# Patient Record
Sex: FEMALE | Race: BLACK OR AFRICAN AMERICAN | NOT HISPANIC OR LATINO | Employment: FULL TIME | URBAN - METROPOLITAN AREA
[De-identification: names, ages, dates, MRNs, and addresses within clinical notes are randomized per-mention and may not be internally consistent; named-entity substitution may affect disease eponyms.]

---

## 2023-09-12 ENCOUNTER — LAB (OUTPATIENT)
Dept: LAB | Facility: LAB | Age: 63
End: 2023-09-12
Payer: COMMERCIAL

## 2023-09-12 ENCOUNTER — OFFICE VISIT (OUTPATIENT)
Dept: PRIMARY CARE | Facility: CLINIC | Age: 63
End: 2023-09-12
Payer: COMMERCIAL

## 2023-09-12 VITALS
TEMPERATURE: 94.2 F | SYSTOLIC BLOOD PRESSURE: 110 MMHG | WEIGHT: 288.7 LBS | BODY MASS INDEX: 46.4 KG/M2 | OXYGEN SATURATION: 95 % | HEIGHT: 66 IN | HEART RATE: 92 BPM | DIASTOLIC BLOOD PRESSURE: 74 MMHG | RESPIRATION RATE: 15 BRPM

## 2023-09-12 DIAGNOSIS — R10.31 RIGHT LOWER QUADRANT ABDOMINAL PAIN: Primary | ICD-10-CM

## 2023-09-12 DIAGNOSIS — R11.2 NAUSEA AND VOMITING, UNSPECIFIED VOMITING TYPE: ICD-10-CM

## 2023-09-12 DIAGNOSIS — R10.31 RIGHT LOWER QUADRANT ABDOMINAL PAIN: ICD-10-CM

## 2023-09-12 PROBLEM — C50.919 BREAST CANCER (MULTI): Status: ACTIVE | Noted: 2023-09-12

## 2023-09-12 PROBLEM — D48.5 NEOPLASM OF UNCERTAIN BEHAVIOR OF SKIN: Status: ACTIVE | Noted: 2023-01-18

## 2023-09-12 PROBLEM — Z20.822 EXPOSURE TO COVID-19 VIRUS: Status: ACTIVE | Noted: 2023-09-12

## 2023-09-12 PROBLEM — R06.09 DYSPNEA ON EXERTION: Status: ACTIVE | Noted: 2023-09-12

## 2023-09-12 PROBLEM — N31.9 BLADDER DYSFUNCTION: Status: ACTIVE | Noted: 2023-09-12

## 2023-09-12 PROBLEM — D22.5 MELANOCYTIC NEVI OF TRUNK: Status: ACTIVE | Noted: 2023-01-18

## 2023-09-12 PROBLEM — R04.2 COUGH WITH HEMOPTYSIS: Status: ACTIVE | Noted: 2023-09-12

## 2023-09-12 PROBLEM — R06.83 SNORING: Status: ACTIVE | Noted: 2023-09-12

## 2023-09-12 PROBLEM — N39.46 URINARY INCONTINENCE, MIXED: Status: ACTIVE | Noted: 2023-09-12

## 2023-09-12 PROBLEM — R51.9 HEADACHE: Status: ACTIVE | Noted: 2023-09-12

## 2023-09-12 PROBLEM — K58.0 IRRITABLE BOWEL SYNDROME WITH DIARRHEA: Status: ACTIVE | Noted: 2023-09-12

## 2023-09-12 PROBLEM — N64.89 BREAST ASYMMETRY: Status: ACTIVE | Noted: 2023-09-12

## 2023-09-12 PROBLEM — R60.0 BILATERAL LEG EDEMA: Status: ACTIVE | Noted: 2023-09-12

## 2023-09-12 PROBLEM — M25.569 KNEE PAIN: Status: ACTIVE | Noted: 2023-09-12

## 2023-09-12 PROBLEM — N12 PYELONEPHRITIS: Status: ACTIVE | Noted: 2023-09-12

## 2023-09-12 PROBLEM — L81.4 OTHER MELANIN HYPERPIGMENTATION: Status: ACTIVE | Noted: 2023-01-18

## 2023-09-12 PROBLEM — I77.819 AORTIC DILATATION (CMS-HCC): Status: ACTIVE | Noted: 2023-09-12

## 2023-09-12 PROBLEM — N62 MACROMASTIA: Status: ACTIVE | Noted: 2023-09-12

## 2023-09-12 PROBLEM — E88.810 DYSMETABOLIC SYNDROME: Status: ACTIVE | Noted: 2023-09-12

## 2023-09-12 PROBLEM — L72.3 SEBACEOUS CYST: Status: ACTIVE | Noted: 2023-01-18

## 2023-09-12 PROBLEM — R10.13 DYSPEPSIA: Status: ACTIVE | Noted: 2023-09-12

## 2023-09-12 PROBLEM — H53.8 BLURRY VISION: Status: ACTIVE | Noted: 2023-09-12

## 2023-09-12 PROBLEM — H52.03 HYPERMETROPIA OF BOTH EYES: Status: ACTIVE | Noted: 2023-09-12

## 2023-09-12 PROBLEM — R20.0 FACIAL NUMBNESS: Status: ACTIVE | Noted: 2023-09-12

## 2023-09-12 PROBLEM — G47.33 OBSTRUCTIVE SLEEP APNEA, ADULT: Status: ACTIVE | Noted: 2023-09-12

## 2023-09-12 PROBLEM — M25.469 KNEE EFFUSION: Status: ACTIVE | Noted: 2023-09-12

## 2023-09-12 PROBLEM — Z85.3 PERSONAL HISTORY OF MALIGNANT NEOPLASM OF BREAST: Status: ACTIVE | Noted: 2023-09-12

## 2023-09-12 PROBLEM — E28.39 HYPOESTROGENISM: Status: ACTIVE | Noted: 2023-09-12

## 2023-09-12 PROBLEM — F40.240 CLAUSTROPHOBIA: Status: ACTIVE | Noted: 2023-09-12

## 2023-09-12 PROBLEM — N81.6 RECTOCELE: Status: ACTIVE | Noted: 2023-09-12

## 2023-09-12 PROBLEM — K29.70 GASTRITIS: Status: ACTIVE | Noted: 2023-09-12

## 2023-09-12 PROBLEM — R73.01 IMPAIRED FASTING GLUCOSE: Status: ACTIVE | Noted: 2023-09-12

## 2023-09-12 PROBLEM — R93.89 ABNORMAL MRI: Status: ACTIVE | Noted: 2023-09-12

## 2023-09-12 PROBLEM — H52.4 BILATERAL PRESBYOPIA: Status: ACTIVE | Noted: 2023-09-12

## 2023-09-12 PROBLEM — R60.9 EDEMA: Status: ACTIVE | Noted: 2023-09-12

## 2023-09-12 PROBLEM — S89.90XA KNEE INJURIES: Status: ACTIVE | Noted: 2023-09-12

## 2023-09-12 PROBLEM — G35 MULTIPLE SCLEROSIS (MULTI): Status: ACTIVE | Noted: 2023-09-12

## 2023-09-12 PROBLEM — D18.01 HEMANGIOMA OF SKIN AND SUBCUTANEOUS TISSUE: Status: ACTIVE | Noted: 2023-01-18

## 2023-09-12 PROBLEM — R06.00 DYSPNEA, PAROXYSMAL NOCTURNAL: Status: ACTIVE | Noted: 2023-09-12

## 2023-09-12 PROBLEM — H52.203 ASTIGMATISM, BILATERAL: Status: ACTIVE | Noted: 2023-09-12

## 2023-09-12 PROBLEM — L03.90 CELLULITIS: Status: ACTIVE | Noted: 2023-09-12

## 2023-09-12 PROBLEM — L82.1 OTHER SEBORRHEIC KERATOSIS: Status: ACTIVE | Noted: 2023-01-18

## 2023-09-12 PROBLEM — R92.30 DENSE BREAST TISSUE ON MAMMOGRAM: Status: ACTIVE | Noted: 2023-09-12

## 2023-09-12 PROBLEM — G25.81 RESTLESS LEG SYNDROME: Status: ACTIVE | Noted: 2023-09-12

## 2023-09-12 PROBLEM — E66.9 OBESITY, UNSPECIFIED OBESITY SEVERITY, UNSPECIFIED OBESITY TYPE: Status: ACTIVE | Noted: 2023-09-12

## 2023-09-12 PROBLEM — M17.12 ARTHRITIS OF LEFT KNEE: Status: ACTIVE | Noted: 2023-09-12

## 2023-09-12 PROBLEM — R93.5 ABNORMAL ABDOMINAL CT SCAN: Status: ACTIVE | Noted: 2023-09-12

## 2023-09-12 PROBLEM — G43.009 MIGRAINE WITHOUT AURA AND WITHOUT STATUS MIGRAINOSUS, NOT INTRACTABLE: Status: ACTIVE | Noted: 2023-09-12

## 2023-09-12 LAB
ALANINE AMINOTRANSFERASE (SGPT) (U/L) IN SER/PLAS: 12 U/L (ref 7–45)
ALBUMIN (G/DL) IN SER/PLAS: 3.8 G/DL (ref 3.4–5)
ALKALINE PHOSPHATASE (U/L) IN SER/PLAS: 85 U/L (ref 33–136)
ANION GAP IN SER/PLAS: 16 MMOL/L (ref 10–20)
APPEARANCE, URINE: ABNORMAL
ASPARTATE AMINOTRANSFERASE (SGOT) (U/L) IN SER/PLAS: 20 U/L (ref 9–39)
BASOPHILS (10*3/UL) IN BLOOD BY AUTOMATED COUNT: 0.03 X10E9/L (ref 0–0.1)
BASOPHILS/100 LEUKOCYTES IN BLOOD BY AUTOMATED COUNT: 0.5 % (ref 0–2)
BILIRUBIN TOTAL (MG/DL) IN SER/PLAS: 0.5 MG/DL (ref 0–1.2)
BILIRUBIN, URINE: NEGATIVE
BLOOD, URINE: ABNORMAL
CALCIUM (MG/DL) IN SER/PLAS: 9.1 MG/DL (ref 8.6–10.6)
CARBON DIOXIDE, TOTAL (MMOL/L) IN SER/PLAS: 27 MMOL/L (ref 21–32)
CHLORIDE (MMOL/L) IN SER/PLAS: 103 MMOL/L (ref 98–107)
CHOLESTEROL (MG/DL) IN SER/PLAS: 172 MG/DL (ref 0–199)
CHOLESTEROL IN HDL (MG/DL) IN SER/PLAS: 47.2 MG/DL
CHOLESTEROL/HDL RATIO: 3.6
COLOR, URINE: YELLOW
CREATININE (MG/DL) IN SER/PLAS: 0.64 MG/DL (ref 0.5–1.05)
EOSINOPHILS (10*3/UL) IN BLOOD BY AUTOMATED COUNT: 0.18 X10E9/L (ref 0–0.7)
EOSINOPHILS/100 LEUKOCYTES IN BLOOD BY AUTOMATED COUNT: 3.3 % (ref 0–6)
ERYTHROCYTE DISTRIBUTION WIDTH (RATIO) BY AUTOMATED COUNT: 16 % (ref 11.5–14.5)
ERYTHROCYTE MEAN CORPUSCULAR HEMOGLOBIN CONCENTRATION (G/DL) BY AUTOMATED: 30.1 G/DL (ref 32–36)
ERYTHROCYTE MEAN CORPUSCULAR VOLUME (FL) BY AUTOMATED COUNT: 83 FL (ref 80–100)
ERYTHROCYTES (10*6/UL) IN BLOOD BY AUTOMATED COUNT: 4.52 X10E12/L (ref 4–5.2)
FINE GRANULAR CASTS, URINE: ABNORMAL /LPF
GFR FEMALE: >90 ML/MIN/1.73M2
GLUCOSE (MG/DL) IN SER/PLAS: 106 MG/DL (ref 74–99)
GLUCOSE, URINE: NEGATIVE MG/DL
HEMATOCRIT (%) IN BLOOD BY AUTOMATED COUNT: 37.6 % (ref 36–46)
HEMOGLOBIN (G/DL) IN BLOOD: 11.3 G/DL (ref 12–16)
HYALINE CASTS, URINE: ABNORMAL /LPF
IMMATURE GRANULOCYTES/100 LEUKOCYTES IN BLOOD BY AUTOMATED COUNT: 0.2 % (ref 0–0.9)
KETONES, URINE: NEGATIVE MG/DL
LDL: 99 MG/DL (ref 0–99)
LEUKOCYTE ESTERASE, URINE: ABNORMAL
LEUKOCYTES (10*3/UL) IN BLOOD BY AUTOMATED COUNT: 5.5 X10E9/L (ref 4.4–11.3)
LIPASE (U/L) IN SER/PLAS: 18 U/L (ref 9–82)
LYMPHOCYTES (10*3/UL) IN BLOOD BY AUTOMATED COUNT: 2.23 X10E9/L (ref 1.2–4.8)
LYMPHOCYTES/100 LEUKOCYTES IN BLOOD BY AUTOMATED COUNT: 40.4 % (ref 13–44)
MONOCYTES (10*3/UL) IN BLOOD BY AUTOMATED COUNT: 0.4 X10E9/L (ref 0.1–1)
MONOCYTES/100 LEUKOCYTES IN BLOOD BY AUTOMATED COUNT: 7.2 % (ref 2–10)
MUCUS, URINE: ABNORMAL /LPF
NEUTROPHILS (10*3/UL) IN BLOOD BY AUTOMATED COUNT: 2.67 X10E9/L (ref 1.2–7.7)
NEUTROPHILS/100 LEUKOCYTES IN BLOOD BY AUTOMATED COUNT: 48.4 % (ref 40–80)
NITRITE, URINE: NEGATIVE
NRBC (PER 100 WBCS) BY AUTOMATED COUNT: 0 /100 WBC (ref 0–0)
PH, URINE: 6 (ref 5–8)
PLATELETS (10*3/UL) IN BLOOD AUTOMATED COUNT: 327 X10E9/L (ref 150–450)
POTASSIUM (MMOL/L) IN SER/PLAS: 4.6 MMOL/L (ref 3.5–5.3)
PROTEIN TOTAL: 7.1 G/DL (ref 6.4–8.2)
PROTEIN, URINE: NEGATIVE MG/DL
RBC, URINE: 2 /HPF (ref 0–5)
SODIUM (MMOL/L) IN SER/PLAS: 141 MMOL/L (ref 136–145)
SPECIFIC GRAVITY, URINE: 1.02 (ref 1–1.03)
SQUAMOUS EPITHELIAL CELLS, URINE: 17 /HPF
TRIGLYCERIDE (MG/DL) IN SER/PLAS: 128 MG/DL (ref 0–149)
UREA NITROGEN (MG/DL) IN SER/PLAS: 12 MG/DL (ref 6–23)
UROBILINOGEN, URINE: <2 MG/DL (ref 0–1.9)
VLDL: 26 MG/DL (ref 0–40)
WBC, URINE: 3 /HPF (ref 0–5)

## 2023-09-12 PROCEDURE — 3008F BODY MASS INDEX DOCD: CPT | Performed by: STUDENT IN AN ORGANIZED HEALTH CARE EDUCATION/TRAINING PROGRAM

## 2023-09-12 PROCEDURE — 80053 COMPREHEN METABOLIC PANEL: CPT

## 2023-09-12 PROCEDURE — 81001 URINALYSIS AUTO W/SCOPE: CPT

## 2023-09-12 PROCEDURE — 83690 ASSAY OF LIPASE: CPT

## 2023-09-12 PROCEDURE — 99214 OFFICE O/P EST MOD 30 MIN: CPT | Performed by: STUDENT IN AN ORGANIZED HEALTH CARE EDUCATION/TRAINING PROGRAM

## 2023-09-12 PROCEDURE — 80061 LIPID PANEL: CPT

## 2023-09-12 PROCEDURE — 85025 COMPLETE CBC W/AUTO DIFF WBC: CPT

## 2023-09-12 PROCEDURE — 1036F TOBACCO NON-USER: CPT | Performed by: STUDENT IN AN ORGANIZED HEALTH CARE EDUCATION/TRAINING PROGRAM

## 2023-09-12 PROCEDURE — 36415 COLL VENOUS BLD VENIPUNCTURE: CPT

## 2023-09-12 RX ORDER — ANASTROZOLE 1 MG/1
1 TABLET ORAL DAILY
COMMUNITY
Start: 2022-07-15 | End: 2024-02-08 | Stop reason: ALTCHOICE

## 2023-09-12 RX ORDER — VENLAFAXINE 37.5 MG/1
1 TABLET ORAL DAILY
COMMUNITY
Start: 2022-03-18 | End: 2023-11-08 | Stop reason: SDUPTHER

## 2023-09-12 RX ORDER — FUROSEMIDE 20 MG/1
1 TABLET ORAL DAILY PRN
COMMUNITY
Start: 2022-07-21 | End: 2024-03-15 | Stop reason: WASHOUT

## 2023-09-12 RX ORDER — ONDANSETRON 4 MG/1
4 TABLET, FILM COATED ORAL EVERY 8 HOURS PRN
Qty: 20 TABLET | Refills: 0 | Status: SHIPPED | OUTPATIENT
Start: 2023-09-12 | End: 2023-09-19

## 2023-09-12 ASSESSMENT — ENCOUNTER SYMPTOMS
HEADACHES: 0
FEVER: 0
VOMITING: 1
ABDOMINAL PAIN: 1
NAUSEA: 1
HEMATOCHEZIA: 0
ANOREXIA: 1
DIARRHEA: 1
HEMATURIA: 0
DYSURIA: 0

## 2023-09-12 ASSESSMENT — LIFESTYLE VARIABLES: HOW MANY STANDARD DRINKS CONTAINING ALCOHOL DO YOU HAVE ON A TYPICAL DAY: PATIENT DOES NOT DRINK

## 2023-09-12 ASSESSMENT — PATIENT HEALTH QUESTIONNAIRE - PHQ9
SUM OF ALL RESPONSES TO PHQ9 QUESTIONS 1 AND 2: 0
1. LITTLE INTEREST OR PLEASURE IN DOING THINGS: NOT AT ALL
2. FEELING DOWN, DEPRESSED OR HOPELESS: NOT AT ALL

## 2023-09-12 NOTE — PROGRESS NOTES
Subjective   Patient ID: Calvin Monroe is a pleasant 63 y.o. female who presents for Follow-up (Pt is here for stomach issues.).  Abdominal Pain  This is a chronic problem. The current episode started more than 1 month ago (2 months). The onset quality is gradual. The problem occurs constantly. The problem has been unchanged. The pain is located in the RLQ (radiating to the back). The abdominal pain radiates to the back. Associated symptoms include anorexia, diarrhea, melena, nausea and vomiting. Pertinent negatives include no dysuria, fever, headaches, hematochezia or hematuria. The pain is aggravated by certain positions. The pain is relieved by Nothing. She has tried nothing for the symptoms. Her past medical history is significant for abdominal surgery (hernia repair 31 years ago) and gallstones.   Colonoscopy (start at age 46): 2016, repeat in 10 years     Review of Systems   Constitutional:  Negative for fever.   Gastrointestinal:  Positive for abdominal pain, anorexia, diarrhea, melena, nausea and vomiting. Negative for hematochezia.   Genitourinary:  Negative for dysuria and hematuria.   Neurological:  Negative for headaches.       Visit Vitals  /74 (Patient Position: Sitting)   Pulse 92   Temp 34.6 °C (94.2 °F)   Resp 15          Objective   Physical Exam  Constitutional:       General: She is not in acute distress.     Appearance: Normal appearance. She is obese.   HENT:      Head: Normocephalic and atraumatic.   Eyes:      General: No scleral icterus.     Conjunctiva/sclera: Conjunctivae normal.   Cardiovascular:      Rate and Rhythm: Normal rate and regular rhythm.      Heart sounds: Normal heart sounds.   Pulmonary:      Effort: Pulmonary effort is normal.      Breath sounds: Normal breath sounds. No wheezing.   Abdominal:      General: Bowel sounds are normal. There is no distension.      Palpations: Abdomen is soft.      Tenderness: There is abdominal tenderness (RUQ).    Musculoskeletal:      Cervical back: Neck supple.      Right lower leg: No edema.      Left lower leg: No edema.   Lymphadenopathy:      Cervical: No cervical adenopathy.   Skin:     General: Skin is warm and dry.   Neurological:      General: No focal deficit present.      Mental Status: She is alert and oriented to person, place, and time.   Psychiatric:         Mood and Affect: Mood normal.         Behavior: Behavior normal.         Assessment/Plan   Problem List Items Addressed This Visit       Nausea with vomiting    Relevant Medications    ondansetron (Zofran) 4 mg tablet     Other Visit Diagnoses       Right lower quadrant abdominal pain    -  Primary    Relevant Medications    ondansetron (Zofran) 4 mg tablet    Other Relevant Orders    CT abdomen pelvis w and wo IV contrast    CBC and Auto Differential    Comprehensive Metabolic Panel    Lipase    Lipid Panel    Urinalysis with Reflex Microscopic    US gallbladder    Referral to Gastroenterology

## 2023-09-14 ENCOUNTER — TELEPHONE (OUTPATIENT)
Dept: PRIMARY CARE | Facility: CLINIC | Age: 63
End: 2023-09-14
Payer: COMMERCIAL

## 2023-09-14 DIAGNOSIS — N39.0 URINARY TRACT INFECTION WITH HEMATURIA, SITE UNSPECIFIED: Primary | ICD-10-CM

## 2023-09-14 DIAGNOSIS — R31.9 URINARY TRACT INFECTION WITH HEMATURIA, SITE UNSPECIFIED: Primary | ICD-10-CM

## 2023-09-14 RX ORDER — NITROFURANTOIN 25; 75 MG/1; MG/1
100 CAPSULE ORAL 2 TIMES DAILY
Qty: 10 CAPSULE | Refills: 0 | Status: SHIPPED | OUTPATIENT
Start: 2023-09-14 | End: 2023-09-19

## 2023-09-27 ENCOUNTER — TELEPHONE (OUTPATIENT)
Dept: PRIMARY CARE | Facility: CLINIC | Age: 63
End: 2023-09-27

## 2023-09-27 PROBLEM — E66.01 MORBID OBESITY WITH BMI OF 40.0-44.9, ADULT (MULTI): Status: ACTIVE | Noted: 2023-09-27

## 2023-09-27 PROBLEM — R73.03 PREDIABETES: Status: ACTIVE | Noted: 2023-09-27

## 2023-09-27 NOTE — PROGRESS NOTES
Calvin Monroe is a 63 y.o. female with past medical history of breast cancer in remission, MS, and irritable bowel syndrome who is referred by Dr. Lagunas for evaluation of RLQ abdominal pain. She reports a 6-12 month history of abdominal pain. This started gradual but has been progressively worsening. It occurs daily. Pain is in RLQ and radiates around to right flank/lower back. She has bowel movements daily but stools vary in consistency. At times occasional urgency and at times difficult to defecate require manual disimpaction. She has intermittent rectal bleeding on tissue when wiping. Then, every few weeks stools will be dark and tarry. She has bloating, nausea, and regurgitation. Denies abnormal weight loss and oral aphthous ulcerations. She has tried antacids in the past with minimal improvement. She recently had a CT scan with benign appearing liver lesion. Unremarkable gallbladder and bowel. She was also noted to have a mild anemia with Hgb 11. MCV ranging from 70-80. She no longer gets a menstrual cycle. She has required IV iron infusions in the past. First colonoscopy years ago in [place] with polyps. She then had repeat at  around  that was reportedly normal.    Social history: No tobacco. Drinks wine occasionally. Occasional marijuana, otherwise denies illicit drugs.    Family history: Maternal grandmother had stomach cancer,  in 80s. Paternal uncle with colon cancer. Denies family history of IBD.     Past Medical History:   Diagnosis Date    Anxiety disorder, unspecified     Anxiety    Bilious vomiting 2016    Bilious vomiting with nausea    Headache, unspecified 2017    Headache    Irritable bowel syndrome with diarrhea 2016    Irritable bowel syndrome with diarrhea    Mammographic calcification found on diagnostic imaging of breast 2015    Breast calcification, right    Overactive bladder     OAB (overactive bladder)    Personal history of diseases of  the blood and blood-forming organs and certain disorders involving the immune mechanism     History of anemia    Personal history of diseases of the skin and subcutaneous tissue 07/27/2018    History of cyst of breast    Personal history of other diseases of the female genital tract 08/01/2017    History of breast pain    Personal history of other mental and behavioral disorders     History of depression    Personal history of other specified conditions     History of lump of left breast     Past Surgical History:   Procedure Laterality Date    HERNIA REPAIR  10/17/2014    Hernia Repair    MR HEAD ANGIO WO IV CONTRAST  6/12/2021    MR HEAD ANGIO WO IV CONTRAST 6/12/2021 [facility]    MR NECK ANGIO WO IV CONTRAST  6/12/2021    MR NECK ANGIO WO IV CONTRAST 6/12/2021 [facility]    OTHER SURGICAL HISTORY  10/17/2014    Laparoscopic Excision Of Ectopic Pregnancy And Salpingectomy    OTHER SURGICAL HISTORY  08/03/2021    Knee arthroscopy     Current Outpatient Medications   Medication Sig Dispense Refill    anastrozole (Arimidex) 1 mg tablet Take 1 tablet (1 mg total) by mouth once daily.      furosemide (Lasix) 20 mg tablet Take 1 tablet (20 mg) by mouth once daily as needed.      venlafaxine (Effexor) 37.5 mg tablet Take 1 tablet (37.5 mg) by mouth once daily.      hyoscyamine (Anaspaz, Levsin) 0.125 mg tablet Take 1 tablet (0.125 mg) by mouth every 6 hours if needed for cramping or diarrhea. 90 tablet 1     No current facility-administered medications for this visit.     Allergies   Allergen Reactions    Adhesive Tape-Silicones Itching    Latex Hives    Meperidine Other     GI upset    Morphine Other     GI upset       Family History   Problem Relation Name Age of Onset    Other (murder) Mother      Alzheimer's disease Father      Diabetes Father      Lung cancer Father      Asthma Brother      Stomach cancer Maternal Grandmother      Breast cancer Paternal Grandmother      Other (cardiac disorder)  "Other      Stroke Other      Ovarian cancer Other      Breast cancer Other         Review of Systems    Review of Systems negative except as noted in HPI.    Objective     /83   Temp 36.4 °C (97.5 °F)   Resp 18   Ht 1.651 m (5' 5\")   Wt 132 kg (291 lb)   BMI 48.42 kg/m²      Physical Exam  Constitutional:  No acute distress. Normal appearance. Not ill-appearing.  Cardiovascular:  Normal rate. Regular rhythm.  Pulmonary:  Pulmonary effort normal. No respiratory distress. Breath sounds clear.  Abdominal:  Abdomen is soft. There is no distension. Mild right sided abdominal tenderness with palpation. No rebound tenderness or guarding.  Skin: Dry.  Neurological:  Alert and oriented to person, place, and time.  Psychiatric:  Mood and affect normal.    Assessment/Plan     63 y.o. female with history of breast cancer in remission, MS, and IBS who presents today for clinic visit for 6-12 month history of RLQ pain with fluctuating bowels and intermittent rectal bleeding in the setting of a mild anemia. Recent CT unrevealing.    Recommendations  Obtain ferritin and iron panel.  Start Psyllium fiber supplement.  Start Levsin as needed.   Obtain fecal calprotectin and H. Pylori stool Ag.   Schedule colonoscopy. If iron deficient will consider adding on EGD as well.  Follow up after above testing.    Electronically signed by Lara Armas CNP on 10/2/2023 at 4:03 PM       "

## 2023-10-02 ENCOUNTER — LAB (OUTPATIENT)
Dept: LAB | Facility: LAB | Age: 63
End: 2023-10-02
Payer: COMMERCIAL

## 2023-10-02 ENCOUNTER — OFFICE VISIT (OUTPATIENT)
Dept: GASTROENTEROLOGY | Facility: HOSPITAL | Age: 63
End: 2023-10-02
Payer: COMMERCIAL

## 2023-10-02 VITALS
BODY MASS INDEX: 48.48 KG/M2 | SYSTOLIC BLOOD PRESSURE: 123 MMHG | HEIGHT: 65 IN | DIASTOLIC BLOOD PRESSURE: 83 MMHG | TEMPERATURE: 97.5 F | RESPIRATION RATE: 18 BRPM | WEIGHT: 291 LBS

## 2023-10-02 DIAGNOSIS — D50.9 IRON DEFICIENCY ANEMIA, UNSPECIFIED IRON DEFICIENCY ANEMIA TYPE: Primary | ICD-10-CM

## 2023-10-02 DIAGNOSIS — R10.31 RIGHT LOWER QUADRANT ABDOMINAL PAIN: ICD-10-CM

## 2023-10-02 DIAGNOSIS — D50.9 IRON DEFICIENCY ANEMIA, UNSPECIFIED IRON DEFICIENCY ANEMIA TYPE: ICD-10-CM

## 2023-10-02 LAB
FERRITIN SERPL-MCNC: 121 NG/ML (ref 8–150)
IRON SATN MFR SERPL: 8 % (ref 25–45)
IRON SERPL-MCNC: 27 UG/DL (ref 35–150)
TIBC SERPL-MCNC: 319 UG/DL (ref 240–445)
UIBC SERPL-MCNC: 292 UG/DL (ref 110–370)

## 2023-10-02 PROCEDURE — 3008F BODY MASS INDEX DOCD: CPT | Performed by: NURSE PRACTITIONER

## 2023-10-02 PROCEDURE — 82728 ASSAY OF FERRITIN: CPT

## 2023-10-02 PROCEDURE — 1036F TOBACCO NON-USER: CPT | Performed by: NURSE PRACTITIONER

## 2023-10-02 PROCEDURE — 36415 COLL VENOUS BLD VENIPUNCTURE: CPT

## 2023-10-02 PROCEDURE — 99204 OFFICE O/P NEW MOD 45 MIN: CPT | Performed by: NURSE PRACTITIONER

## 2023-10-02 PROCEDURE — 83540 ASSAY OF IRON: CPT

## 2023-10-02 PROCEDURE — 83550 IRON BINDING TEST: CPT

## 2023-10-02 PROCEDURE — 99214 OFFICE O/P EST MOD 30 MIN: CPT | Performed by: NURSE PRACTITIONER

## 2023-10-02 RX ORDER — HYOSCYAMINE SULFATE 0.125 MG
0.12 TABLET ORAL EVERY 6 HOURS PRN
Qty: 90 TABLET | Refills: 1 | Status: SHIPPED | OUTPATIENT
Start: 2023-10-02 | End: 2023-11-27 | Stop reason: ALTCHOICE

## 2023-10-02 ASSESSMENT — ENCOUNTER SYMPTOMS
OCCASIONAL FEELINGS OF UNSTEADINESS: 0
LOSS OF SENSATION IN FEET: 0
DEPRESSION: 0

## 2023-10-02 ASSESSMENT — PATIENT HEALTH QUESTIONNAIRE - PHQ9
2. FEELING DOWN, DEPRESSED OR HOPELESS: NOT AT ALL
SUM OF ALL RESPONSES TO PHQ9 QUESTIONS 1 AND 2: 1
1. LITTLE INTEREST OR PLEASURE IN DOING THINGS: SEVERAL DAYS

## 2023-10-02 ASSESSMENT — COLUMBIA-SUICIDE SEVERITY RATING SCALE - C-SSRS
6. HAVE YOU EVER DONE ANYTHING, STARTED TO DO ANYTHING, OR PREPARED TO DO ANYTHING TO END YOUR LIFE?: NO
1. IN THE PAST MONTH, HAVE YOU WISHED YOU WERE DEAD OR WISHED YOU COULD GO TO SLEEP AND NOT WAKE UP?: NO
2. HAVE YOU ACTUALLY HAD ANY THOUGHTS OF KILLING YOURSELF?: NO

## 2023-10-02 NOTE — PATIENT INSTRUCTIONS
Recommendations  Start Psyllium fiber supplement.  Obtain labs - ferritin and iron panel.   Obtain fecal calprotectin and H. Pylori stool tests.   Schedule colonoscopy. I will call you if we need to add on upper endoscopy.  Follow up with me after above testing.

## 2023-10-03 ENCOUNTER — LAB (OUTPATIENT)
Dept: LAB | Facility: LAB | Age: 63
End: 2023-10-03
Payer: COMMERCIAL

## 2023-10-03 ENCOUNTER — TELEPHONE (OUTPATIENT)
Dept: GASTROENTEROLOGY | Facility: HOSPITAL | Age: 63
End: 2023-10-03
Payer: COMMERCIAL

## 2023-10-03 DIAGNOSIS — R19.5 DARK STOOLS: ICD-10-CM

## 2023-10-03 DIAGNOSIS — D50.9 IRON DEFICIENCY ANEMIA, UNSPECIFIED IRON DEFICIENCY ANEMIA TYPE: Primary | ICD-10-CM

## 2023-10-03 DIAGNOSIS — R10.31 RIGHT LOWER QUADRANT ABDOMINAL PAIN: ICD-10-CM

## 2023-10-03 DIAGNOSIS — D50.9 IRON DEFICIENCY ANEMIA, UNSPECIFIED IRON DEFICIENCY ANEMIA TYPE: ICD-10-CM

## 2023-10-03 PROCEDURE — 36415 COLL VENOUS BLD VENIPUNCTURE: CPT

## 2023-10-03 PROCEDURE — 87449 NOS EACH ORGANISM AG IA: CPT

## 2023-10-03 PROCEDURE — 83993 ASSAY FOR CALPROTECTIN FECAL: CPT

## 2023-10-03 NOTE — TELEPHONE ENCOUNTER
Called and LVM of iron deficiency. Will add EGD to upcoming colonoscopy. Referral placed to Hematology for iron infusion.

## 2023-10-05 ENCOUNTER — PATIENT MESSAGE (OUTPATIENT)
Dept: GASTROENTEROLOGY | Facility: HOSPITAL | Age: 63
End: 2023-10-05
Payer: COMMERCIAL

## 2023-10-05 LAB — CALPROTECTIN STL-MCNT: 17 UG/G

## 2023-10-06 LAB — H PYLORI AG STL QL IA: NEGATIVE

## 2023-10-09 NOTE — H&P
History Of Present Illness  Calvin Monreo is a 63 y.o. female presenting with anemia and right lower quad pain.     Past Medical History  Past Medical History:   Diagnosis Date    Anxiety disorder, unspecified     Anxiety    Bilious vomiting 04/26/2016    Bilious vomiting with nausea    Breast CA (CMS/HCC)     Headache, unspecified 02/03/2017    Headache    Irritable bowel syndrome with diarrhea 04/07/2016    Irritable bowel syndrome with diarrhea    Mammographic calcification found on diagnostic imaging of breast 06/01/2015    Breast calcification, right    ARIAN (obstructive sleep apnea)     Overactive bladder     OAB (overactive bladder)    Personal history of diseases of the blood and blood-forming organs and certain disorders involving the immune mechanism     History of anemia    Personal history of diseases of the skin and subcutaneous tissue 07/27/2018    History of cyst of breast    Personal history of other diseases of the female genital tract 08/01/2017    History of breast pain    Personal history of other mental and behavioral disorders     History of depression    Personal history of other specified conditions     History of lump of left breast       Surgical History  Past Surgical History:   Procedure Laterality Date    BREAST LUMPECTOMY Right 08/2021    HERNIA REPAIR  10/17/2014    Hernia Repair    MR HEAD ANGIO WO IV CONTRAST  06/12/2021    MR HEAD ANGIO WO IV CONTRAST 6/12/2021 [facility]    MR NECK ANGIO WO IV CONTRAST  06/12/2021    MR NECK ANGIO WO IV CONTRAST 6/12/2021 [facility]    OTHER SURGICAL HISTORY  10/17/2014    Laparoscopic Excision Of Ectopic Pregnancy And Salpingectomy    OTHER SURGICAL HISTORY  08/03/2021    Knee arthroscopy        Social History  She reports that she has never smoked. She has never used smokeless tobacco. She reports current alcohol use. She reports that she does not use drugs.    Family History  Family History   Problem Relation Name  Age of Onset    Other (murder) Mother      Alzheimer's disease Father      Diabetes Father      Lung cancer Father      Asthma Brother      Stomach cancer Maternal Grandmother      Breast cancer Paternal Grandmother      Other (cardiac disorder) Other      Stroke Other      Ovarian cancer Other      Breast cancer Other          Allergies  Adhesive tape-silicones, Latex, Meperidine, and Morphine    Review of Systems     Physical Exam  Vitals reviewed.   Constitutional:       General: She is awake.   Cardiovascular:      Comments: No overt chest pain  Pulmonary:      Effort: Pulmonary effort is normal.      Breath sounds: Normal breath sounds.   Abdominal:      General: Bowel sounds are normal.      Palpations: Abdomen is soft.      Tenderness: There is no abdominal tenderness.   Neurological:      Mental Status: She is alert and oriented to person, place, and time.   Psychiatric:         Attention and Perception: Attention and perception normal.         Behavior: Behavior normal.          Last Recorded Vitals  There were no vitals taken for this visit.    Relevant Results              Assessment/Plan        Iron def and right lower quad pain.  Proceed with colonoscopy and EGD              Jonny Davison MD

## 2023-10-10 ENCOUNTER — ANESTHESIA EVENT (OUTPATIENT)
Dept: GASTROENTEROLOGY | Facility: HOSPITAL | Age: 63
End: 2023-10-10
Payer: COMMERCIAL

## 2023-10-10 ENCOUNTER — HOSPITAL ENCOUNTER (OUTPATIENT)
Dept: GASTROENTEROLOGY | Facility: HOSPITAL | Age: 63
Discharge: HOME | End: 2023-10-10
Payer: COMMERCIAL

## 2023-10-10 ENCOUNTER — ANESTHESIA (OUTPATIENT)
Dept: GASTROENTEROLOGY | Facility: HOSPITAL | Age: 63
End: 2023-10-10
Payer: COMMERCIAL

## 2023-10-10 VITALS
RESPIRATION RATE: 18 BRPM | BODY MASS INDEX: 48.08 KG/M2 | HEART RATE: 86 BPM | SYSTOLIC BLOOD PRESSURE: 124 MMHG | TEMPERATURE: 96.8 F | OXYGEN SATURATION: 100 % | HEIGHT: 65 IN | DIASTOLIC BLOOD PRESSURE: 70 MMHG | WEIGHT: 288.58 LBS

## 2023-10-10 DIAGNOSIS — R19.5 DARK STOOLS: ICD-10-CM

## 2023-10-10 DIAGNOSIS — D50.9 IRON DEFICIENCY ANEMIA, UNSPECIFIED IRON DEFICIENCY ANEMIA TYPE: ICD-10-CM

## 2023-10-10 DIAGNOSIS — R10.31 RIGHT LOWER QUADRANT ABDOMINAL PAIN: ICD-10-CM

## 2023-10-10 PROCEDURE — 43239 EGD BIOPSY SINGLE/MULTIPLE: CPT | Performed by: INTERNAL MEDICINE

## 2023-10-10 PROCEDURE — 88305 TISSUE EXAM BY PATHOLOGIST: CPT | Performed by: PATHOLOGY

## 2023-10-10 PROCEDURE — 2580000001 HC RX 258 IV SOLUTIONS: Performed by: INTERNAL MEDICINE

## 2023-10-10 PROCEDURE — 7100000009 HC PHASE TWO TIME - INITIAL BASE CHARGE

## 2023-10-10 PROCEDURE — A43239 PR EDG TRANSORAL BIOPSY SINGLE/MULTIPLE: Performed by: ANESTHESIOLOGY

## 2023-10-10 PROCEDURE — 7100000010 HC PHASE TWO TIME - EACH INCREMENTAL 1 MINUTE

## 2023-10-10 PROCEDURE — 45378 DIAGNOSTIC COLONOSCOPY: CPT | Performed by: INTERNAL MEDICINE

## 2023-10-10 PROCEDURE — 2500000005 HC RX 250 GENERAL PHARMACY W/O HCPCS: Performed by: NURSE ANESTHETIST, CERTIFIED REGISTERED

## 2023-10-10 PROCEDURE — A43239 PR EDG TRANSORAL BIOPSY SINGLE/MULTIPLE: Performed by: NURSE ANESTHETIST, CERTIFIED REGISTERED

## 2023-10-10 PROCEDURE — 2500000004 HC RX 250 GENERAL PHARMACY W/ HCPCS (ALT 636 FOR OP/ED): Performed by: NURSE ANESTHETIST, CERTIFIED REGISTERED

## 2023-10-10 PROCEDURE — 3700000001 HC GENERAL ANESTHESIA TIME - INITIAL BASE CHARGE

## 2023-10-10 PROCEDURE — 88305 TISSUE EXAM BY PATHOLOGIST: CPT | Mod: TC | Performed by: INTERNAL MEDICINE

## 2023-10-10 PROCEDURE — 3700000002 HC GENERAL ANESTHESIA TIME - EACH INCREMENTAL 1 MINUTE

## 2023-10-10 RX ORDER — MIDAZOLAM HYDROCHLORIDE 1 MG/ML
INJECTION, SOLUTION INTRAMUSCULAR; INTRAVENOUS AS NEEDED
Status: DISCONTINUED | OUTPATIENT
Start: 2023-10-10 | End: 2023-10-10

## 2023-10-10 RX ORDER — PROPOFOL 10 MG/ML
INJECTION, EMULSION INTRAVENOUS AS NEEDED
Status: DISCONTINUED | OUTPATIENT
Start: 2023-10-10 | End: 2023-10-10

## 2023-10-10 RX ORDER — LIDOCAINE HYDROCHLORIDE 20 MG/ML
INJECTION, SOLUTION INFILTRATION; PERINEURAL AS NEEDED
Status: DISCONTINUED | OUTPATIENT
Start: 2023-10-10 | End: 2023-10-10

## 2023-10-10 RX ORDER — PROPOFOL 10 MG/ML
INJECTION, EMULSION INTRAVENOUS CONTINUOUS PRN
Status: DISCONTINUED | OUTPATIENT
Start: 2023-10-10 | End: 2023-10-10

## 2023-10-10 RX ORDER — SODIUM CHLORIDE, SODIUM LACTATE, POTASSIUM CHLORIDE, CALCIUM CHLORIDE 600; 310; 30; 20 MG/100ML; MG/100ML; MG/100ML; MG/100ML
20 INJECTION, SOLUTION INTRAVENOUS CONTINUOUS
Status: DISCONTINUED | OUTPATIENT
Start: 2023-10-10 | End: 2023-10-20 | Stop reason: HOSPADM

## 2023-10-10 RX ADMIN — MIDAZOLAM HYDROCHLORIDE 2 MG: 1 INJECTION INTRAMUSCULAR; INTRAVENOUS at 10:45

## 2023-10-10 RX ADMIN — PROPOFOL 100 MCG/KG/MIN: 10 INJECTION, EMULSION INTRAVENOUS at 11:06

## 2023-10-10 RX ADMIN — LIDOCAINE HYDROCHLORIDE 60 MG: 20 INJECTION, SOLUTION INFILTRATION; PERINEURAL at 10:48

## 2023-10-10 RX ADMIN — PROPOFOL 50 MCG/KG/MIN: 10 INJECTION, EMULSION INTRAVENOUS at 10:50

## 2023-10-10 RX ADMIN — SODIUM CHLORIDE, POTASSIUM CHLORIDE, SODIUM LACTATE AND CALCIUM CHLORIDE 20 ML/HR: 600; 310; 30; 20 INJECTION, SOLUTION INTRAVENOUS at 10:07

## 2023-10-10 RX ADMIN — PROPOFOL 40 MG: 10 INJECTION, EMULSION INTRAVENOUS at 11:05

## 2023-10-10 RX ADMIN — PROPOFOL 50 MG: 10 INJECTION, EMULSION INTRAVENOUS at 10:50

## 2023-10-10 ASSESSMENT — PAIN SCALES - GENERAL
PAINLEVEL_OUTOF10: 0 - NO PAIN
PAIN_LEVEL: 0
PAINLEVEL_OUTOF10: 0 - NO PAIN

## 2023-10-10 ASSESSMENT — COLUMBIA-SUICIDE SEVERITY RATING SCALE - C-SSRS
2. HAVE YOU ACTUALLY HAD ANY THOUGHTS OF KILLING YOURSELF?: NO
6. HAVE YOU EVER DONE ANYTHING, STARTED TO DO ANYTHING, OR PREPARED TO DO ANYTHING TO END YOUR LIFE?: NO
1. IN THE PAST MONTH, HAVE YOU WISHED YOU WERE DEAD OR WISHED YOU COULD GO TO SLEEP AND NOT WAKE UP?: NO

## 2023-10-10 ASSESSMENT — PAIN - FUNCTIONAL ASSESSMENT: PAIN_FUNCTIONAL_ASSESSMENT: 0-10

## 2023-10-10 NOTE — DISCHARGE INSTRUCTIONS

## 2023-10-10 NOTE — ANESTHESIA POSTPROCEDURE EVALUATION
Patient: Calvin WOLFE Avery-Field    Procedure Summary       Date: 10/10/23 Room / Location: [facility]    Anesthesia Start: 1032 Anesthesia Stop:     Procedures:       COLONOSCOPY      EGD Diagnosis:       Right lower quadrant abdominal pain      Iron deficiency anemia, unspecified iron deficiency anemia type      Dark stools    Scheduled Providers: Jonny Davison MD; Eddie Acevedo MD; Amber Lombardo, RN; Lara Snell RN; Marylin Sterling MA Responsible Provider: Eddie Acevedo MD    Anesthesia Type: general ASA Status: 3            Anesthesia Type: general    Vitals Value Taken Time   /62 10/10/23 1125   Temp 36 10/10/23 1125   Pulse 87 10/10/23 1125   Resp 20 10/10/23 1125   SpO2 95 10/10/23 1125       Anesthesia Post Evaluation    Patient location during evaluation: PACU  Patient participation: complete - patient participated  Level of consciousness: awake and alert  Pain score: 0  Pain management: adequate  Cardiovascular status: acceptable  Respiratory status: acceptable  Hydration status: acceptable        No notable events documented.

## 2023-10-10 NOTE — SIGNIFICANT EVENT
Patient arrived to [facility] after procedure with Anesthesia and procedure RN, procedure discussed, plan reviewed, VSS

## 2023-10-10 NOTE — ANESTHESIA PREPROCEDURE EVALUATION
Patient: Calvin WOLFE Avery[place]    Procedure Information       Date/Time: 10/10/23 1050    Scheduled providers: Jonny Davison MD; Eddie Acevedo MD; Amber Lombardo, YULISSA; Lara Snell RN; Marylin Sterling MA    Procedures:       COLONOSCOPY      EGD    Location: [facility]            Relevant Problems   Cardiovascular   (+) Dyspnea, paroxysmal nocturnal      Endocrine   (+) Obesity, unspecified obesity severity, unspecified obesity type      GI   (+) Irritable bowel syndrome with diarrhea      /Renal   (+) Pyelonephritis      Neuro/Psych   (+) Claustrophobia   (+) Multiple sclerosis (CMS/HCC)      Pulmonary   (+) Dyspnea on exertion   (+) Obstructive sleep apnea, adult      Infectious Disease   (+) Pyelonephritis      Other   (+) Arthritis of left knee       Clinical information reviewed:    Allergies  Meds  Problems              NPO Detail:  No data recorded     Physical Exam    Airway  Mallampati: II     Cardiovascular   Rhythm: regular     Dental    Pulmonary   Breath sounds clear to auscultation     Abdominal            Anesthesia Plan    ASA 3     general   (TIVA)  Anesthetic plan and risks discussed with patient.    Plan discussed with CRNA and CAA.

## 2023-10-11 ASSESSMENT — PAIN SCALES - GENERAL: PAINLEVEL_OUTOF10: 0 - NO PAIN

## 2023-10-17 LAB
LABORATORY COMMENT REPORT: NORMAL
PATH REPORT.FINAL DX SPEC: NORMAL
PATH REPORT.GROSS SPEC: NORMAL
PATH REPORT.TOTAL CANCER: NORMAL

## 2023-10-19 ENCOUNTER — ANCILLARY PROCEDURE (OUTPATIENT)
Dept: RADIOLOGY | Facility: CLINIC | Age: 63
End: 2023-10-19
Payer: COMMERCIAL

## 2023-10-19 DIAGNOSIS — R05.9 COUGH, UNSPECIFIED TYPE: ICD-10-CM

## 2023-10-19 DIAGNOSIS — R05.8 PRODUCTIVE COUGH: Primary | ICD-10-CM

## 2023-10-19 PROCEDURE — 71046 X-RAY EXAM CHEST 2 VIEWS: CPT | Mod: FOREIGN READ | Performed by: RADIOLOGY

## 2023-10-19 PROCEDURE — 71046 X-RAY EXAM CHEST 2 VIEWS: CPT | Mod: FY,FR

## 2023-10-26 ENCOUNTER — OFFICE VISIT (OUTPATIENT)
Dept: HEMATOLOGY/ONCOLOGY | Facility: CLINIC | Age: 63
End: 2023-10-26
Payer: COMMERCIAL

## 2023-10-26 VITALS
WEIGHT: 292.11 LBS | HEART RATE: 86 BPM | RESPIRATION RATE: 16 BRPM | OXYGEN SATURATION: 95 % | SYSTOLIC BLOOD PRESSURE: 122 MMHG | DIASTOLIC BLOOD PRESSURE: 82 MMHG | HEIGHT: 65 IN | BODY MASS INDEX: 48.67 KG/M2 | TEMPERATURE: 97.7 F

## 2023-10-26 DIAGNOSIS — D50.9 IRON DEFICIENCY ANEMIA, UNSPECIFIED IRON DEFICIENCY ANEMIA TYPE: Primary | ICD-10-CM

## 2023-10-26 DIAGNOSIS — M89.8X9 BONE PAIN: ICD-10-CM

## 2023-10-26 LAB
ALBUMIN SERPL BCP-MCNC: 3.7 G/DL (ref 3.4–5)
ALP SERPL-CCNC: 84 U/L (ref 33–136)
ALT SERPL W P-5'-P-CCNC: 11 U/L (ref 7–45)
ANION GAP SERPL CALC-SCNC: 12 MMOL/L (ref 10–20)
AST SERPL W P-5'-P-CCNC: 13 U/L (ref 9–39)
BASOPHILS # BLD AUTO: 0.04 X10*3/UL (ref 0–0.1)
BASOPHILS NFR BLD AUTO: 0.6 %
BILIRUB SERPL-MCNC: 0.3 MG/DL (ref 0–1.2)
BUN SERPL-MCNC: 13 MG/DL (ref 6–23)
CALCIUM SERPL-MCNC: 9.2 MG/DL (ref 8.6–10.3)
CENTROMERE B AB SER-ACNC: <0.2 AI
CHLORIDE SERPL-SCNC: 104 MMOL/L (ref 98–107)
CHROMATIN AB SERPL-ACNC: <0.2 AI
CO2 SERPL-SCNC: 29 MMOL/L (ref 21–32)
CREAT SERPL-MCNC: 0.67 MG/DL (ref 0.5–1.05)
CRP SERPL-MCNC: 3.35 MG/DL
DSDNA AB SER-ACNC: <1 IU/ML
ENA JO1 AB SER QL IA: <0.2 AI
ENA RNP AB SER IA-ACNC: <0.2 AI
ENA SCL70 AB SER QL IA: <0.2 AI
ENA SM AB SER IA-ACNC: <0.2 AI
ENA SM+RNP AB SER QL IA: <0.2 AI
ENA SS-A AB SER IA-ACNC: <0.2 AI
ENA SS-B AB SER IA-ACNC: <0.2 AI
EOSINOPHIL # BLD AUTO: 0.27 X10*3/UL (ref 0–0.7)
EOSINOPHIL NFR BLD AUTO: 4.2 %
ERYTHROCYTE [DISTWIDTH] IN BLOOD BY AUTOMATED COUNT: 15.8 % (ref 11.5–14.5)
ERYTHROCYTE [SEDIMENTATION RATE] IN BLOOD BY WESTERGREN METHOD: 76 MM/H (ref 0–30)
FERRITIN SERPL-MCNC: 108 NG/ML (ref 8–150)
GFR SERPL CREATININE-BSD FRML MDRD: >90 ML/MIN/1.73M*2
GLUCOSE SERPL-MCNC: 132 MG/DL (ref 74–99)
HCT VFR BLD AUTO: 36.6 % (ref 36–46)
HGB BLD-MCNC: 10.8 G/DL (ref 12–16)
HGB RETIC QN: 28 PG (ref 28–38)
IGA SERPL-MCNC: 409 MG/DL (ref 70–400)
IGG SERPL-MCNC: 1450 MG/DL (ref 700–1600)
IGM SERPL-MCNC: 137 MG/DL (ref 40–230)
IMM GRANULOCYTES # BLD AUTO: 0.01 X10*3/UL (ref 0–0.7)
IMM GRANULOCYTES NFR BLD AUTO: 0.2 % (ref 0–0.9)
IMMATURE RETIC FRACTION: 15.8 %
IRON SATN MFR SERPL: 10 % (ref 25–45)
IRON SERPL-MCNC: 29 UG/DL (ref 35–150)
LDH SERPL L TO P-CCNC: 147 U/L (ref 84–246)
LYMPHOCYTES # BLD AUTO: 2.47 X10*3/UL (ref 1.2–4.8)
LYMPHOCYTES NFR BLD AUTO: 38 %
MCH RBC QN AUTO: 23.9 PG (ref 26–34)
MCHC RBC AUTO-ENTMCNC: 29.5 G/DL (ref 32–36)
MCV RBC AUTO: 81 FL (ref 80–100)
MONOCYTES # BLD AUTO: 0.49 X10*3/UL (ref 0.1–1)
MONOCYTES NFR BLD AUTO: 7.5 %
NEUTROPHILS # BLD AUTO: 3.22 X10*3/UL (ref 1.2–7.7)
NEUTROPHILS NFR BLD AUTO: 49.5 %
NRBC BLD-RTO: 0 /100 WBCS (ref 0–0)
PLATELET # BLD AUTO: 319 X10*3/UL (ref 150–450)
PMV BLD AUTO: 9.9 FL (ref 7.5–11.5)
POTASSIUM SERPL-SCNC: 4.1 MMOL/L (ref 3.5–5.3)
PROT SERPL-MCNC: 6.9 G/DL (ref 6.4–8.2)
PROT SERPL-MCNC: 7.1 G/DL (ref 6.4–8.2)
RBC # BLD AUTO: 4.52 X10*6/UL (ref 4–5.2)
RETICS #: 0.07 X10*6/UL (ref 0.02–0.08)
RETICS/RBC NFR AUTO: 1.4 % (ref 0.5–2)
RIBOSOMAL P AB SER-ACNC: <0.2 AI
SODIUM SERPL-SCNC: 141 MMOL/L (ref 136–145)
TIBC SERPL-MCNC: 297 UG/DL (ref 240–445)
UIBC SERPL-MCNC: 268 UG/DL (ref 110–370)
WBC # BLD AUTO: 6.5 X10*3/UL (ref 4.4–11.3)

## 2023-10-26 PROCEDURE — 83521 IG LIGHT CHAINS FREE EACH: CPT

## 2023-10-26 PROCEDURE — 85652 RBC SED RATE AUTOMATED: CPT

## 2023-10-26 PROCEDURE — 83021 HEMOGLOBIN CHROMOTOGRAPHY: CPT

## 2023-10-26 PROCEDURE — 82728 ASSAY OF FERRITIN: CPT

## 2023-10-26 PROCEDURE — 83540 ASSAY OF IRON: CPT

## 2023-10-26 PROCEDURE — 1036F TOBACCO NON-USER: CPT

## 2023-10-26 PROCEDURE — 84155 ASSAY OF PROTEIN SERUM: CPT | Mod: 59

## 2023-10-26 PROCEDURE — 83010 ASSAY OF HAPTOGLOBIN QUANT: CPT

## 2023-10-26 PROCEDURE — 3008F BODY MASS INDEX DOCD: CPT

## 2023-10-26 PROCEDURE — 36415 COLL VENOUS BLD VENIPUNCTURE: CPT

## 2023-10-26 PROCEDURE — 86235 NUCLEAR ANTIGEN ANTIBODY: CPT

## 2023-10-26 PROCEDURE — 85045 AUTOMATED RETICULOCYTE COUNT: CPT

## 2023-10-26 PROCEDURE — 84165 PROTEIN E-PHORESIS SERUM: CPT

## 2023-10-26 PROCEDURE — 86038 ANTINUCLEAR ANTIBODIES: CPT

## 2023-10-26 PROCEDURE — 86225 DNA ANTIBODY NATIVE: CPT

## 2023-10-26 PROCEDURE — 83615 LACTATE (LD) (LDH) ENZYME: CPT

## 2023-10-26 PROCEDURE — 86334 IMMUNOFIX E-PHORESIS SERUM: CPT

## 2023-10-26 PROCEDURE — 99205 OFFICE O/P NEW HI 60 MIN: CPT

## 2023-10-26 PROCEDURE — 82784 ASSAY IGA/IGD/IGG/IGM EACH: CPT

## 2023-10-26 PROCEDURE — 86320 SERUM IMMUNOELECTROPHORESIS: CPT

## 2023-10-26 PROCEDURE — 84450 TRANSFERASE (AST) (SGOT): CPT

## 2023-10-26 PROCEDURE — 84155 ASSAY OF PROTEIN SERUM: CPT

## 2023-10-26 PROCEDURE — 86140 C-REACTIVE PROTEIN: CPT

## 2023-10-26 PROCEDURE — 85025 COMPLETE CBC W/AUTO DIFF WBC: CPT

## 2023-10-26 PROCEDURE — 99215 OFFICE O/P EST HI 40 MIN: CPT

## 2023-10-26 PROCEDURE — 80053 COMPREHEN METABOLIC PANEL: CPT

## 2023-10-26 PROCEDURE — 83020 HEMOGLOBIN ELECTROPHORESIS: CPT

## 2023-10-26 RX ORDER — HEPARIN 100 UNIT/ML
500 SYRINGE INTRAVENOUS AS NEEDED
Status: CANCELLED | OUTPATIENT
Start: 2023-10-30

## 2023-10-26 RX ORDER — HEPARIN SODIUM,PORCINE/PF 10 UNIT/ML
50 SYRINGE (ML) INTRAVENOUS AS NEEDED
Status: CANCELLED | OUTPATIENT
Start: 2023-10-30

## 2023-10-26 RX ORDER — ALBUTEROL SULFATE 0.83 MG/ML
3 SOLUTION RESPIRATORY (INHALATION) AS NEEDED
Status: CANCELLED | OUTPATIENT
Start: 2023-11-02

## 2023-10-26 RX ORDER — DIPHENHYDRAMINE HYDROCHLORIDE 50 MG/ML
50 INJECTION INTRAMUSCULAR; INTRAVENOUS AS NEEDED
Status: CANCELLED | OUTPATIENT
Start: 2023-11-02

## 2023-10-26 RX ORDER — EPINEPHRINE 0.3 MG/.3ML
0.3 INJECTION SUBCUTANEOUS EVERY 5 MIN PRN
Status: CANCELLED | OUTPATIENT
Start: 2023-11-02

## 2023-10-26 RX ORDER — FAMOTIDINE 10 MG/ML
20 INJECTION INTRAVENOUS ONCE AS NEEDED
Status: CANCELLED | OUTPATIENT
Start: 2023-11-02

## 2023-10-26 ASSESSMENT — PATIENT HEALTH QUESTIONNAIRE - PHQ9
SUM OF ALL RESPONSES TO PHQ9 QUESTIONS 1 AND 2: 0
2. FEELING DOWN, DEPRESSED OR HOPELESS: NOT AT ALL
1. LITTLE INTEREST OR PLEASURE IN DOING THINGS: NOT AT ALL

## 2023-10-26 ASSESSMENT — ENCOUNTER SYMPTOMS
LOSS OF SENSATION IN FEET: 0
DEPRESSION: 0
OCCASIONAL FEELINGS OF UNSTEADINESS: 0

## 2023-10-26 ASSESSMENT — COLUMBIA-SUICIDE SEVERITY RATING SCALE - C-SSRS
1. IN THE PAST MONTH, HAVE YOU WISHED YOU WERE DEAD OR WISHED YOU COULD GO TO SLEEP AND NOT WAKE UP?: NO
6. HAVE YOU EVER DONE ANYTHING, STARTED TO DO ANYTHING, OR PREPARED TO DO ANYTHING TO END YOUR LIFE?: NO
2. HAVE YOU ACTUALLY HAD ANY THOUGHTS OF KILLING YOURSELF?: NO

## 2023-10-26 ASSESSMENT — PAIN SCALES - GENERAL: PAINLEVEL: 0-NO PAIN

## 2023-10-26 NOTE — PROGRESS NOTES
Patient Visit Information:   Visit Type: New Visit      Cancer History:   Treatment Synopsis:       Breast         AJCC Edition: 8th (AJCC), Diagnosis Date: 27-Jul-2021, IA, pT1b pN0 cM0 G2     Treatment Synopsis:    61-year-old postmenopausal AA female with rightt-sided invasive lobular carcinoma, stage IA (T1b N0 M0). The patient's breast cancer was diagnosed on July 27, 2021,  and is estrogen receptor positive at >95%, progesterone receptor positive at >95%, and HER-2/xiao negative, grade 2 with MammaPrint Index = +0.120; translating to Low Risk Luminal A with a 10-year risk of recurrence of 10%. Details of her history are as  follows:       07/21/2021: Patient underwent a bilateral MRI for screening due to dense breast. This showed a mass measuring 0.8 x 0.8 x 0.7 cm mass. No axillary  or internal mammary LN appreciated   07/27/2021: Second look targeted US of the right breast and axilla-revealed 5 normal looking LNs.    07/27/2021: Patient underwent a US-guided biopsy of the right breast at 12:00, 4 cm from the nipple. Pathology was consistent with results as above   08/19/2021: Patient underwent a right partial mastectomy with SLNB. Pathology showed a 0.6cminvasive carcinoma, grade 2, with 0/2 SLNs involved. Margins were negative   2/23/2022: Completed Radiation   03/03/2022: Started Arimidex    History of Present Illness:      ID Statement:      Calvin Monroe is a 63 year old female       Chief Complaint: New Visit Anemia Evaluation    Interval History:    Patient is a pleasant 63-year-old female presents today for anemia evaluation.  She reports increased headache and dizziness.  Feeling tired each day, fatigue more recently.  Running low fevers.  Reports normal appetite no weight loss.  No pica symptoms.  Reports night sweats occasionally though is on anastrozole and being followed by Dr. [facility].  Diagnosis of hormone positive breast cancer diagnosed July 21, 2021.  Treatment synopsis as  above.  She reports that she has had increased in joint, back, rib, leg pain, lately.  She has taken edible Gummies due to this pain.  She had a recent EGD and colonoscopy 10/10/2023 due to abdominal- right upper quadrant.  Biopsies for celiac disease pending.  Internal hemorrhoids identified no other abnormal findings.  On 09/26/2023 CT abdomen pelvis shows hypodense liver lesion.  The stability since 2020 indicating benign etiology.  Ultrasound of gallbladder same date shows diffusely increased hepatic echogenicity may be seen with hepatic  steatosis or hepatocellular disease. X-ray completed October 19, 2023 that showed no acute cardiopulmonary process.  She reports they were ruling out pneumonia.  Bilateral mammogram pending, previous 12/2022 recommended 1 year screening no malignancy identified.  She does report a recent urinary tract infection that has resolved.  Patient denies cough, visual or hearing changes, oral sores or lesions of the skin, shortness of breath, problem swallowing, pain in the chest, current urinary issues, diarrhea, constipation, unusual vaginal discharge or dryness, any new lymphadenopathy or changes in her chest/breast area.  She reports her breast cancer was discovered during a routine mammogram screening.    Medical history:  -Edema in lower extremities  -Irritable bowel syndrome  -Multiple sclerosis  -Right side breast cancer hormone positive on anastrozole  -Perimenopausal  -Hematuria  -Partial meniscectomy  -Umbilical hernia repair    Social history:  -She lives with  and has 3 healthy children and multiple grandchildren  -Homemaker  -She never smoked  -She does not drink other than wine occasionally marijuana Gummies for pain as needed recently  -No illicit drug use    Family history:  -Maternal grandmother stomach and liver cancer  -Paternal grandmother: Breast cancer  -Paternal uncle colon cancer  -No other known hematologic, genetic or cancer diagnosis in first-degree  relatives    Review of Systems:   A review of systems has been completed and are negative for complaints except what is stated in the assessment, HPI, IH, and/or past medical history.           Allergies and Intolerances:       Allergies:   Allergies   Allergen Reactions    Adhesive Tape-Silicones Itching    Latex Hives    Meperidine Other     GI upset    Morphine Other     GI upset         Outpatient Medication Profile:     Current Outpatient Medications   Medication Instructions    anastrozole (Arimidex) 1 mg tablet 1 tablet, oral, Daily    furosemide (Lasix) 20 mg tablet 1 tablet, oral, Daily PRN    hyoscyamine (ANASPAZ, LEVSIN) 0.125 mg, oral, Every 6 hours PRN    venlafaxine (Effexor) 37.5 mg tablet 1 tablet, oral, Daily        Performance:   ECOG Performance Status: 0         Vitals and Measurements:   Visit Vitals  /82 (BP Location: Left arm, Patient Position: Sitting, BP Cuff Size: Large adult long)   Pulse 86   Temp 36.5 °C (97.7 °F) (Temporal)   Resp 16      Vitals:    10/26/23 1414   Weight: 132 kg (292 lb 1.8 oz)                 Physical Exam:      Constitutional: Patient is awake/alert/oriented  x3, no distress, Nourished, hydrated, alert and cooperative   Eyes: PERRL, EOMI, clear sclera   ENMT: mucous membranes moist, no oral lesions    Head/Neck: Neck supple, no apparent injury, thyroid  without mass or tenderness, No JVD, trachea midline, no bruits   Respiratory/Thorax: Patent airways, CTAB, chest symmetry, normal inspiratory and expiratory effort    Cardiovascular: Regular, rate and rhythm, normal S1 and S2, no carotid bruit   Gastrointestinal: Non-distended, soft, right upper quad tenderness with palpation, no masses or organomegaly appreciated   Genitourinary: deferred   Musculoskeletal: Pain in lumbar spine with palpation, lower right hip discomfort with palpation, normal strength for baseline    Extremities: Mild nonpitting ankle edema bilateral, normal strength, good circulation pulses  "  Neurological: alert and oriented x3, intact senses,  motor, response and reflexes, normal strength, cranial nerves normal   Breast: deferred   Lymphatic: No significant lymphadenopathy   Psychological: Appropriate mood and behavior   Skin: dry, no lesions, no rashes         Lab Results:   Labs:  Lab Results   Component Value Date    WBC 6.5 10/26/2023    NEUTROABS 3.22 10/26/2023    IGABSOL 0.01 10/26/2023    LYMPHSABS 2.47 10/26/2023    MONOSABS 0.49 10/26/2023    EOSABS 0.27 10/26/2023    BASOSABS 0.04 10/26/2023    RBC 4.52 10/26/2023    MCV 81 10/26/2023    MCHC 29.5 (L) 10/26/2023    HGB 10.8 (L) 10/26/2023    HCT 36.6 10/26/2023     10/26/2023     Lab Results   Component Value Date    RETICCTPCT 1.4 10/26/2023      Lab Results   Component Value Date    CREATININE 0.67 10/26/2023    BUN 13 10/26/2023    EGFR >90 10/26/2023     10/26/2023    K 4.1 10/26/2023     10/26/2023    CO2 29 10/26/2023      Lab Results   Component Value Date    ALT 11 10/26/2023    AST 13 10/26/2023    ALKPHOS 84 10/26/2023    BILITOT 0.3 10/26/2023      Lab Results   Component Value Date    TSH 0.48 08/01/2022     Lab Results   Component Value Date    TSH 0.48 08/01/2022     Lab Results   Component Value Date    IRON 29 (L) 10/26/2023    TIBC 297 10/26/2023    FERRITIN 108 10/26/2023      No results found for: \"NILTGYAX68\"   No results found for: \"FOLATE\"  Lab Results   Component Value Date    SEDRATE 76 (H) 10/26/2023      Lab Results   Component Value Date    CRP 3.35 (H) 10/26/2023      No results found for: \"YAHAIRA\"  Lab Results   Component Value Date     10/26/2023        Assessment and Plan:   Ms. Avery Huitron is a pleasant 63-year-old female that presents with her  today for evaluation of anemia.  Per most recent labs drawn today she is iron deficient.  She reports inability to take oral iron due to constipation in the past.  Therefore, IV iron Feraheme is ordered.  Pre-CERT pending.  Infusion " scheduled for October 31, 2023.  Hemoglobin 10.8, ferritin 108, iron TSAT 10%, ESR and CRP elevated.  MCV 81, previously microcytic.  GFR above 90.  Retake percent 1.4.  Discussed the potential causes of iron deficiency anemia.  Her inflammatory markers are elevated.  Full anemia work-up is pending including kidney and liver protein assessment.  SPEP, kappa light chains, immunoglobulins, WARD pending.  Patient will follow-up virtually in 2 to 3 weeks to review labs.  Due to patient's complaint of bone pain I have ordered a skeletal survey as initial assessment.     Follow-up:    RTC:  -- October 31, 2023 for first IV iron infusion  -- 2 to 3 weeks virtually to review laboratory work-up of anemia  -- 4 weeks following final IV iron to evaluate iron stores replenishment    Medications:  -- Feraheme IV x2 doses 1 week apart    Imaging:  -- Bone scan survey due to complaints of rib, hip, leg and back pain    Referrals:  --Other specialties as scheduled  --PCP as needed           Patient Instructions:      Instructions Type: Verbal review of laboratory work-up, discussed causes of iron deficiency anemia, answered questions and addressed concerns.  Patient will call with any questions or concerns or symptom changes.  She will return to infusion center for IV iron and virtual in 2 to 3 weeks.  Patient states understanding and agreeable to this plan of care.

## 2023-10-27 ENCOUNTER — ANCILLARY PROCEDURE (OUTPATIENT)
Dept: RADIOLOGY | Facility: CLINIC | Age: 63
End: 2023-10-27
Payer: COMMERCIAL

## 2023-10-27 DIAGNOSIS — M89.8X9 BONE PAIN: ICD-10-CM

## 2023-10-27 DIAGNOSIS — D50.9 IRON DEFICIENCY ANEMIA, UNSPECIFIED IRON DEFICIENCY ANEMIA TYPE: ICD-10-CM

## 2023-10-27 LAB
ANA SER QL HEP2 SUBST: NEGATIVE
HAPTOGLOB SERPL-MCNC: 236 MG/DL (ref 37–246)
KAPPA LC SERPL-MCNC: 3.48 MG/DL (ref 0.33–1.94)
KAPPA LC/LAMBDA SER: 1.47 {RATIO} (ref 0.26–1.65)
LAMBDA LC SERPL-MCNC: 2.36 MG/DL (ref 0.57–2.63)

## 2023-10-27 PROCEDURE — 77075 RADEX OSSEOUS SURVEY COMPL: CPT

## 2023-10-31 LAB
ALBUMIN: 3.6 G/DL (ref 3.4–5)
ALPHA 1 GLOBULIN: 0.3 G/DL (ref 0.2–0.6)
ALPHA 2 GLOBULIN: 0.8 G/DL (ref 0.4–1.1)
BETA GLOBULIN: 0.9 G/DL (ref 0.5–1.2)
GAMMA GLOBULIN: 1.2 G/DL (ref 0.5–1.4)
HEMOGLOBIN A2: 2.8 % (ref 2–3.5)
HEMOGLOBIN A: 96.9 % (ref 95.8–98)
HEMOGLOBIN F: 0.3 % (ref 0–2)
HEMOGLOBIN IDENTIFICATION INTERPRETATION: NORMAL
IMMUNOFIXATION COMMENT: NORMAL
PATH REVIEW - SERUM IMMUNOFIXATION: NORMAL
PATH REVIEW-HGB IDENTIFICATION: NORMAL
PATH REVIEW-SERUM PROTEIN ELECTROPHORESIS: NORMAL
PROTEIN ELECTROPHORESIS COMMENT: NORMAL

## 2023-11-03 ENCOUNTER — TELEPHONE (OUTPATIENT)
Dept: ADMISSION | Facility: HOSPITAL | Age: 63
End: 2023-11-03

## 2023-11-03 ENCOUNTER — INFUSION (OUTPATIENT)
Dept: HEMATOLOGY/ONCOLOGY | Facility: CLINIC | Age: 63
End: 2023-11-03
Payer: COMMERCIAL

## 2023-11-03 ENCOUNTER — LAB (OUTPATIENT)
Dept: LAB | Facility: CLINIC | Age: 63
End: 2023-11-03
Payer: COMMERCIAL

## 2023-11-03 VITALS
SYSTOLIC BLOOD PRESSURE: 132 MMHG | TEMPERATURE: 98.2 F | OXYGEN SATURATION: 98 % | RESPIRATION RATE: 18 BRPM | BODY MASS INDEX: 44.58 KG/M2 | HEART RATE: 77 BPM | DIASTOLIC BLOOD PRESSURE: 77 MMHG | WEIGHT: 271.17 LBS

## 2023-11-03 DIAGNOSIS — Z85.3 HISTORY OF RIGHT BREAST CANCER: Primary | ICD-10-CM

## 2023-11-03 DIAGNOSIS — D50.9 IRON DEFICIENCY ANEMIA, UNSPECIFIED IRON DEFICIENCY ANEMIA TYPE: ICD-10-CM

## 2023-11-03 LAB
ERYTHROCYTE [DISTWIDTH] IN BLOOD BY AUTOMATED COUNT: 16 % (ref 11.5–14.5)
FERRITIN SERPL-MCNC: 142 NG/ML (ref 8–150)
HCT VFR BLD AUTO: 35.4 % (ref 36–46)
HGB BLD-MCNC: 10.7 G/DL (ref 12–16)
IRON SATN MFR SERPL: 13 % (ref 25–45)
IRON SERPL-MCNC: 38 UG/DL (ref 35–150)
MCH RBC QN AUTO: 24.8 PG (ref 26–34)
MCHC RBC AUTO-ENTMCNC: 30.2 G/DL (ref 32–36)
MCV RBC AUTO: 82 FL (ref 80–100)
NRBC BLD-RTO: ABNORMAL /100{WBCS}
PLATELET # BLD AUTO: 293 X10*3/UL (ref 150–450)
RBC # BLD AUTO: 4.31 X10*6/UL (ref 4–5.2)
TIBC SERPL-MCNC: 300 UG/DL (ref 240–445)
UIBC SERPL-MCNC: 262 UG/DL (ref 110–370)
WBC # BLD AUTO: 7.1 X10*3/UL (ref 4.4–11.3)

## 2023-11-03 PROCEDURE — 82728 ASSAY OF FERRITIN: CPT

## 2023-11-03 PROCEDURE — 85027 COMPLETE CBC AUTOMATED: CPT

## 2023-11-03 PROCEDURE — 83540 ASSAY OF IRON: CPT

## 2023-11-03 PROCEDURE — 2500000004 HC RX 250 GENERAL PHARMACY W/ HCPCS (ALT 636 FOR OP/ED)

## 2023-11-03 PROCEDURE — 36415 COLL VENOUS BLD VENIPUNCTURE: CPT

## 2023-11-03 PROCEDURE — 96365 THER/PROPH/DIAG IV INF INIT: CPT | Mod: INF

## 2023-11-03 RX ORDER — DIPHENHYDRAMINE HYDROCHLORIDE 50 MG/ML
50 INJECTION INTRAMUSCULAR; INTRAVENOUS AS NEEDED
Status: CANCELLED | OUTPATIENT
Start: 2023-11-10

## 2023-11-03 RX ORDER — HEPARIN SODIUM,PORCINE/PF 10 UNIT/ML
50 SYRINGE (ML) INTRAVENOUS AS NEEDED
OUTPATIENT
Start: 2023-11-03

## 2023-11-03 RX ORDER — FAMOTIDINE 10 MG/ML
20 INJECTION INTRAVENOUS ONCE AS NEEDED
Status: CANCELLED | OUTPATIENT
Start: 2023-11-10

## 2023-11-03 RX ORDER — VENLAFAXINE HYDROCHLORIDE 37.5 MG/1
37.5 CAPSULE, EXTENDED RELEASE ORAL DAILY
Qty: 30 CAPSULE | Refills: 5 | Status: SHIPPED | OUTPATIENT
Start: 2023-11-03 | End: 2023-11-27 | Stop reason: SDUPTHER

## 2023-11-03 RX ORDER — EPINEPHRINE 0.3 MG/.3ML
0.3 INJECTION SUBCUTANEOUS EVERY 5 MIN PRN
Status: CANCELLED | OUTPATIENT
Start: 2023-11-10

## 2023-11-03 RX ORDER — HEPARIN SODIUM,PORCINE/PF 10 UNIT/ML
50 SYRINGE (ML) INTRAVENOUS AS NEEDED
Status: DISCONTINUED | OUTPATIENT
Start: 2023-11-03 | End: 2023-11-03 | Stop reason: HOSPADM

## 2023-11-03 RX ORDER — HEPARIN 100 UNIT/ML
500 SYRINGE INTRAVENOUS AS NEEDED
Status: DISCONTINUED | OUTPATIENT
Start: 2023-11-03 | End: 2023-11-03 | Stop reason: HOSPADM

## 2023-11-03 RX ORDER — ALBUTEROL SULFATE 0.83 MG/ML
3 SOLUTION RESPIRATORY (INHALATION) AS NEEDED
Status: CANCELLED | OUTPATIENT
Start: 2023-11-10

## 2023-11-03 RX ORDER — HEPARIN 100 UNIT/ML
500 SYRINGE INTRAVENOUS AS NEEDED
OUTPATIENT
Start: 2023-11-03

## 2023-11-03 RX ADMIN — FERUMOXYTOL 510 MG: 510 INJECTION INTRAVENOUS at 14:27

## 2023-11-08 DIAGNOSIS — Z85.3 HISTORY OF RIGHT BREAST CANCER: Primary | ICD-10-CM

## 2023-11-08 RX ORDER — VENLAFAXINE 37.5 MG/1
37.5 TABLET ORAL DAILY
Qty: 90 TABLET | Refills: 1 | Status: SHIPPED | OUTPATIENT
Start: 2023-11-08

## 2023-11-09 ENCOUNTER — APPOINTMENT (OUTPATIENT)
Dept: HEMATOLOGY/ONCOLOGY | Facility: CLINIC | Age: 63
End: 2023-11-09
Payer: COMMERCIAL

## 2023-11-09 ENCOUNTER — INFUSION (OUTPATIENT)
Dept: HEMATOLOGY/ONCOLOGY | Facility: CLINIC | Age: 63
End: 2023-11-09
Payer: COMMERCIAL

## 2023-11-09 VITALS
SYSTOLIC BLOOD PRESSURE: 126 MMHG | BODY MASS INDEX: 47.45 KG/M2 | RESPIRATION RATE: 80 BRPM | HEART RATE: 80 BPM | OXYGEN SATURATION: 96 % | WEIGHT: 288.58 LBS | TEMPERATURE: 97 F | DIASTOLIC BLOOD PRESSURE: 83 MMHG

## 2023-11-09 DIAGNOSIS — D50.9 IRON DEFICIENCY ANEMIA, UNSPECIFIED IRON DEFICIENCY ANEMIA TYPE: ICD-10-CM

## 2023-11-09 PROCEDURE — 96365 THER/PROPH/DIAG IV INF INIT: CPT | Mod: INF

## 2023-11-09 PROCEDURE — 2500000004 HC RX 250 GENERAL PHARMACY W/ HCPCS (ALT 636 FOR OP/ED)

## 2023-11-09 RX ORDER — ALBUTEROL SULFATE 0.83 MG/ML
3 SOLUTION RESPIRATORY (INHALATION) AS NEEDED
OUTPATIENT
Start: 2023-11-10

## 2023-11-09 RX ORDER — EPINEPHRINE 0.3 MG/.3ML
0.3 INJECTION SUBCUTANEOUS EVERY 5 MIN PRN
OUTPATIENT
Start: 2023-11-10

## 2023-11-09 RX ORDER — FAMOTIDINE 10 MG/ML
20 INJECTION INTRAVENOUS ONCE AS NEEDED
OUTPATIENT
Start: 2023-11-10

## 2023-11-09 RX ORDER — DIPHENHYDRAMINE HYDROCHLORIDE 50 MG/ML
50 INJECTION INTRAMUSCULAR; INTRAVENOUS AS NEEDED
OUTPATIENT
Start: 2023-11-10

## 2023-11-09 RX ADMIN — FERUMOXYTOL 510 MG: 510 INJECTION INTRAVENOUS at 15:56

## 2023-11-09 ASSESSMENT — PAIN SCALES - GENERAL: PAINLEVEL: 0-NO PAIN

## 2023-11-10 ENCOUNTER — APPOINTMENT (OUTPATIENT)
Dept: HEMATOLOGY/ONCOLOGY | Facility: CLINIC | Age: 63
End: 2023-11-10
Payer: COMMERCIAL

## 2023-11-14 ENCOUNTER — TELEPHONE (OUTPATIENT)
Dept: HEMATOLOGY/ONCOLOGY | Facility: HOSPITAL | Age: 63
End: 2023-11-14
Payer: COMMERCIAL

## 2023-11-14 ENCOUNTER — TELEMEDICINE (OUTPATIENT)
Dept: HEMATOLOGY/ONCOLOGY | Facility: CLINIC | Age: 63
End: 2023-11-14
Payer: COMMERCIAL

## 2023-11-14 DIAGNOSIS — D50.8 OTHER IRON DEFICIENCY ANEMIA: Primary | ICD-10-CM

## 2023-11-14 DIAGNOSIS — D50.9 IRON DEFICIENCY ANEMIA, UNSPECIFIED IRON DEFICIENCY ANEMIA TYPE: ICD-10-CM

## 2023-11-14 DIAGNOSIS — K76.9 LIVER LESION: ICD-10-CM

## 2023-11-14 DIAGNOSIS — K76.0 FATTY LIVER: ICD-10-CM

## 2023-11-14 PROCEDURE — 99214 OFFICE O/P EST MOD 30 MIN: CPT

## 2023-11-14 RX ORDER — EPINEPHRINE 0.3 MG/.3ML
0.3 INJECTION SUBCUTANEOUS EVERY 5 MIN PRN
OUTPATIENT
Start: 2024-02-08

## 2023-11-14 RX ORDER — FAMOTIDINE 10 MG/ML
20 INJECTION INTRAVENOUS ONCE AS NEEDED
OUTPATIENT
Start: 2024-02-08

## 2023-11-14 RX ORDER — ALBUTEROL SULFATE 0.83 MG/ML
3 SOLUTION RESPIRATORY (INHALATION) AS NEEDED
OUTPATIENT
Start: 2024-02-08

## 2023-11-14 RX ORDER — DIPHENHYDRAMINE HYDROCHLORIDE 50 MG/ML
50 INJECTION INTRAMUSCULAR; INTRAVENOUS AS NEEDED
OUTPATIENT
Start: 2024-02-08

## 2023-11-14 ASSESSMENT — PATIENT HEALTH QUESTIONNAIRE - PHQ9
2. FEELING DOWN, DEPRESSED, IRRITABLE, OR HOPELESS: NOT AT ALL
8. MOVING OR SPEAKING SO SLOWLY THAT OTHER PEOPLE COULD HAVE NOTICED. OR THE OPPOSITE, BEING SO FIGETY OR RESTLESS THAT YOU HAVE BEEN MOVING AROUND A LOT MORE THAN USUAL: 0
7. TROUBLE CONCENTRATING ON THINGS, SUCH AS READING THE NEWSPAPER OR WATCHING TELEVISION: NOT AT ALL
3. TROUBLE FALLING OR STAYING ASLEEP OR SLEEPING TOO MUCH: MORE THAN HALF THE DAYS
1. LITTLE INTEREST OR PLEASURE IN DOING THINGS: MORE THAN HALF THE DAYS
5. POOR APPETITE OR OVEREATING: 2
2. FEELING DOWN, DEPRESSED, IRRITABLE, OR HOPELESS: 0
9. THOUGHTS THAT YOU WOULD BE BETTER OFF DEAD, OR OF HURTING YOURSELF: NOT AT ALL
7. TROUBLE CONCENTRATING ON THINGS, SUCH AS READING THE NEWSPAPER OR WATCHING TELEVISION: NOT AT ALL
8. MOVING OR SPEAKING SO SLOWLY THAT OTHER PEOPLE COULD HAVE NOTICED. OR THE OPPOSITE, BEING SO FIGETY OR RESTLESS THAT YOU HAVE BEEN MOVING AROUND A LOT MORE THAN USUAL: NOT AT ALL
10. IF YOU CHECKED OFF ANY PROBLEMS, HOW DIFFICULT HAVE THESE PROBLEMS MADE IT FOR YOU TO DO YOUR WORK, TAKE CARE OF THINGS AT HOME, OR GET ALONG WITH OTHER PEOPLE: NOT DIFFICULT AT ALL
6. FEELING BAD ABOUT YOURSELF - OR THAT YOU ARE A FAILURE OR HAVE LET YOURSELF OR YOUR FAMILY DOWN: 0
3. TROUBLE FALLING OR STAYING ASLEEP OR SLEEPING TOO MUCH: MORE THAN HALF THE DAYS
1. LITTLE INTEREST OR PLEASURE IN DOING THINGS: 2
4. FEELING TIRED OR HAVING LITTLE ENERGY: 2
5. POOR APPETITE OR OVEREATING: MORE THAN HALF THE DAYS
9. THOUGHTS THAT YOU WOULD BE BETTER OFF DEAD, OR OF HURTING YOURSELF: NOT AT ALL
3. TROUBLE FALLING OR STAYING ASLEEP OR SLEEPING TOO MUCH: 2
4. FEELING TIRED OR HAVING LITTLE ENERGY: MORE THAN HALF THE DAYS
4. FEELING TIRED OR HAVING LITTLE ENERGY: MORE THAN HALF THE DAYS
2. FEELING DOWN, DEPRESSED OR HOPELESS: NOT AT ALL
6. FEELING BAD ABOUT YOURSELF - OR THAT YOU ARE A FAILURE OR HAVE LET YOURSELF OR YOUR FAMILY DOWN: NOT AT ALL
7. TROUBLE CONCENTRATING ON THINGS, SUCH AS READING THE NEWSPAPER OR WATCHING TELEVISION: 0
8. MOVING OR SPEAKING SO SLOWLY THAT OTHER PEOPLE COULD HAVE NOTICED. OR THE OPPOSITE, BEING SO FIGETY OR RESTLESS THAT YOU HAVE BEEN MOVING AROUND A LOT MORE THAN USUAL: NOT AT ALL
10. IF YOU CHECKED OFF ANY PROBLEMS, HOW DIFFICULT HAVE THESE PROBLEMS MADE IT FOR YOU TO DO YOUR WORK, TAKE CARE OF THINGS AT HOME, OR GET ALONG WITH OTHER PEOPLE: NOT DIFFICULT AT ALL
SUM OF ALL RESPONSES TO PHQ QUESTIONS 1-9: 8
5. POOR APPETITE OR OVEREATING: MORE THAN HALF THE DAYS
6. FEELING BAD ABOUT YOURSELF - OR THAT YOU ARE A FAILURE OR HAVE LET YOURSELF OR YOUR FAMILY DOWN: NOT AT ALL
9. THOUGHTS THAT YOU WOULD BE BETTER OFF DEAD, OR OF HURTING YOURSELF: 0
SUM OF ALL RESPONSES TO PHQ QUESTIONS 1-9: 8
1. LITTLE INTEREST OR PLEASURE IN DOING THINGS: MORE THAN HALF THE DAYS

## 2023-11-14 NOTE — TELEPHONE ENCOUNTER
Reached out to patient to get her scheduled for her follow up visit and her infusion appointment. Scheduled patient for her follow up visit with TREVOR Camacho on February 8th @ 1:30 with her Iron infusion to follow after. Patient verbalized and agreed to appointment.

## 2023-11-14 NOTE — PROGRESS NOTES
Patient Visit Information:   Visit Type: Telehealth Visit F/U      Cancer History:   Treatment Synopsis:       Breast         AJCC Edition: 8th (AJCC), Diagnosis Date: 27-Jul-2021, IA, pT1b pN0 cM0 G2     Treatment Synopsis:    61-year-old postmenopausal AA female with rightt-sided invasive lobular carcinoma, stage IA (T1b N0 M0). The patient's breast cancer was diagnosed on July 27, 2021,  and is estrogen receptor positive at >95%, progesterone receptor positive at >95%, and HER-2/xiao negative, grade 2 with MammaPrint Index = +0.120; translating to Low Risk Luminal A with a 10-year risk of recurrence of 10%. Details of her history are as  follows:       07/21/2021: Patient underwent a bilateral MRI for screening due to dense breast. This showed a mass measuring 0.8 x 0.8 x 0.7 cm mass. No axillary  or internal mammary LN appreciated   07/27/2021: Second look targeted US of the right breast and axilla-revealed 5 normal looking LNs.    07/27/2021: Patient underwent a US-guided biopsy of the right breast at 12:00, 4 cm from the nipple. Pathology was consistent with results as above   08/19/2021: Patient underwent a right partial mastectomy with SLNB. Pathology showed a 0.6cminvasive carcinoma, grade 2, with 0/2 SLNs involved. Margins were negative   2/23/2022: Completed Radiation   03/03/2022: Started Arimidex     History of Present Illness:      ID Statement:       Calvin Monroe is a 63 year old female       Chief Complaint: F/U virtual Visit Anemia post iron infusions   Interval History:    Patient is a pleasant 63-year-old female presents today for anemia follow up.  She reports increased headache and dizziness.  Feeling tired still, better after first infusion.  Reports normal appetite no weight loss.  No pica symptoms.  Reports night sweats occasionally though is on anastrozole and being followed by Dr. Burris [facility].  Diagnosis of hormone positive breast cancer diagnosed July 21, 2021.  Treatment  synopsis as above.  She reports that she has had increased in joint, back, rib, leg pain, lately.  She has taken edible Gummies due to this pain. Bone survey did not show lytic lesions. She had a recent EGD and colonoscopy 10/10/2023 due to abdominal- right upper quadrant.  Biopsies for celiac disease pending.  Internal hemorrhoids identified no other abnormal findings.  On 09/26/2023 CT abdomen pelvis shows hypodense liver lesion.  The stability since 2020 indicating benign etiology.  Ultrasound of gallbladder same date shows diffusely increased hepatic echogenicity may be seen with hepatic steatosis or hepatocellular disease, likely contributing to anemia. X-ray completed October 19, 2023 that showed no acute cardiopulmonary process.  She reports they were ruling out pneumonia.  Bilateral mammogram pending, previous 12/2022 recommended 1 year screening no malignancy identified.  She does report a recent urinary tract infection that has resolved.  Patient denies cough, visual or hearing changes, oral sores or lesions of the skin, shortness of breath, problem swallowing, pain in the chest, current urinary issues, diarrhea, constipation, unusual vaginal discharge or dryness, any new lymphadenopathy or changes in her chest/breast area.  She reports her breast cancer was discovered during a routine mammogram screening.     Medical history:  -Edema in lower extremities  -Irritable bowel syndrome  -Multiple sclerosis  -Right side breast cancer hormone positive on anastrozole  -Perimenopausal  -Hematuria  -Partial meniscectomy  -Umbilical hernia repair     Social history:  -She lives with  and has 3 healthy children and multiple grandchildren  -Homemaker  -She never smoked  -She does not drink other than wine occasionally marijuana Gummies for pain as needed recently  -No illicit drug use     Family history:  -Maternal grandmother stomach and liver cancer  -Paternal grandmother: Breast cancer  -Paternal uncle colon  cancer  -No other known hematologic, genetic or cancer diagnosis in first-degree relatives     Review of Systems:   A review of systems has been completed and are negative for complaints except what is stated in the assessment, HPI, IH, and/or past medical history.           Allergies and Intolerances:       Allergies:         Allergies   Allergen Reactions    Adhesive Tape-Silicones Itching    Latex Hives    Meperidine Other       GI upset    Morphine Other       GI upset         Outpatient Medication Profile:          Current Outpatient Medications   Medication Instructions    anastrozole (Arimidex) 1 mg tablet 1 tablet, oral, Daily    furosemide (Lasix) 20 mg tablet 1 tablet, oral, Daily PRN    hyoscyamine (ANASPAZ, LEVSIN) 0.125 mg, oral, Every 6 hours PRN    venlafaxine (Effexor) 37.5 mg tablet 1 tablet, oral, Daily         Performance:   ECOG Performance Status: 0         Vitals and Measurements:   Visit Vitals  /82 (BP Location: Left arm, Patient Position: Sitting, BP Cuff Size: Large adult long)   Pulse 86   Temp 36.5 °C (97.7 °F) (Temporal)   Resp 16      Vitals       Vitals:     10/26/23 1414   Weight: 132 kg (292 lb 1.8 oz)                       Physical Exam:      Constitutional: Patient is awake/alert/oriented  x3, no distress, Nourished, hydrated, alert and cooperative   Eyes: PERRL, EOMI, clear sclera   ENMT: mucous membranes moist, no oral lesions    Head/Neck: Neck supple, no apparent injury, thyroid  without mass or tenderness, No JVD, trachea midline, no bruits   Respiratory/Thorax: Patent airways, CTAB, chest symmetry, normal inspiratory and expiratory effort    Cardiovascular: Regular, rate and rhythm, normal S1 and S2, no carotid bruit   Gastrointestinal: Non-distended, soft, right upper quad tenderness with palpation, no masses or organomegaly appreciated   Genitourinary: deferred   Musculoskeletal: Pain in lumbar spine with palpation, lower right hip discomfort with palpation, normal  "strength for baseline    Extremities: Mild nonpitting ankle edema bilateral, normal strength, good circulation pulses   Neurological: alert and oriented x3, intact senses,  motor, response and reflexes, normal strength, cranial nerves normal   Breast: deferred   Lymphatic: No significant lymphadenopathy   Psychological: Appropriate mood and behavior   Skin: dry, no lesions, no rashes         Lab Results:   Labs:        Lab Results   Component Value Date     WBC 6.5 10/26/2023     NEUTROABS 3.22 10/26/2023     IGABSOL 0.01 10/26/2023     LYMPHSABS 2.47 10/26/2023     MONOSABS 0.49 10/26/2023     EOSABS 0.27 10/26/2023     BASOSABS 0.04 10/26/2023     RBC 4.52 10/26/2023     MCV 81 10/26/2023     MCHC 29.5 (L) 10/26/2023     HGB 10.8 (L) 10/26/2023     HCT 36.6 10/26/2023      10/26/2023            Lab Results   Component Value Date     RETICCTPCT 1.4 10/26/2023            Lab Results   Component Value Date     CREATININE 0.67 10/26/2023     BUN 13 10/26/2023     EGFR >90 10/26/2023      10/26/2023     K 4.1 10/26/2023      10/26/2023     CO2 29 10/26/2023            Lab Results   Component Value Date     ALT 11 10/26/2023     AST 13 10/26/2023     ALKPHOS 84 10/26/2023     BILITOT 0.3 10/26/2023            Lab Results   Component Value Date     TSH 0.48 08/01/2022            Lab Results   Component Value Date     TSH 0.48 08/01/2022            Lab Results   Component Value Date     IRON 29 (L) 10/26/2023     TIBC 297 10/26/2023     FERRITIN 108 10/26/2023      No results found for: \"[id number]\"   No results found for: \"FOLATE\"        Lab Results   Component Value Date     SEDRATE 76 (H) 10/26/2023            Lab Results   Component Value Date     CRP 3.35 (H) 10/26/2023      No results found for: \"YAHAIRA\"        Lab Results   Component Value Date      10/26/2023         Assessment and Plan:   Ms. Avery Huitron is a pleasant 63-year-old female that presents virtually today for F/U on evaluation " of anemia.  Per most recent labs drawn today she is iron deficient.  She reports inability to take oral iron due to constipation in the past.  Therefore, IV iron Feraheme is ordered and received two doses. Tolerated well.   Infusion received 10/31 and 11/07. She will have repeat labs in 4 weeks to assess iron stores. I have scheduled more iron for 3 months as needed with clinic appointment.  Hemoglobin 10.8, ferritin 108, iron TSAT 10%, ESR and CRP elevated.  MCV 81, previously microcytic.  GFR above 90.  Retic percent calculated 1.4.  Discussed the potential causes of iron deficiency anemia.  Her inflammatory markers are elevated.  Full anemia work-up is negative for concerning markers. Her SPEP is normal, kappa light chains elevated likely inflammatory, immunoglobulins IgA slightly elevated at 409, WARD negative.   Due to patient's complaint of bone pain in lower back, skeletal survey ordered and negative for lytic or concerning myeloma lesions. Notable degenerative changes. Referral to Rheumatology for elevated ESR and CRP and musculoskeletal pain. We will continue to monitor immunoglobulins and inflammatory markers along with anemia labs.     Follow-up:     RTC:    -- 4 weeks labs following final IV iron to evaluate iron stores replenishment  -- 3 months from initial visit, labs, with concurrent IV iron infusion scheduled as needed     Medications:  -- Feraheme IV x2 doses 1 week apart as need in 3 months (scheduled)     Imaging:  -- None at this time     Referrals:  --Referral Rheumatology   --Referral to Hepatology for liver lesion and fatty liver  --PCP as needed           Patient Instructions:      Instructions Type: Verbal review of laboratory work-up, discussed causes of iron deficiency anemia, answered questions and addressed concerns.  Patient will call with any questions or concerns or symptom changes.  Patient states understanding and agreeable to this plan of care.       Thank you for allowing me to  care for you today.    Sincerely,  Aarti Mcdonald, APRN-CNP

## 2023-11-16 ENCOUNTER — TELEPHONE (OUTPATIENT)
Dept: PRIMARY CARE | Facility: CLINIC | Age: 63
End: 2023-11-16
Payer: COMMERCIAL

## 2023-11-16 ENCOUNTER — APPOINTMENT (OUTPATIENT)
Dept: HEMATOLOGY/ONCOLOGY | Facility: CLINIC | Age: 63
End: 2023-11-16
Payer: COMMERCIAL

## 2023-11-16 DIAGNOSIS — K76.0 HEPATIC STEATOSIS: Primary | ICD-10-CM

## 2023-11-16 NOTE — TELEPHONE ENCOUNTER
Patient said scheduling told her to have you change the referral for Hepatology to be STAT for a sooner appointment then late February.

## 2023-11-27 ENCOUNTER — OFFICE VISIT (OUTPATIENT)
Dept: PRIMARY CARE | Facility: CLINIC | Age: 63
End: 2023-11-27
Payer: COMMERCIAL

## 2023-11-27 VITALS
HEIGHT: 65 IN | SYSTOLIC BLOOD PRESSURE: 112 MMHG | WEIGHT: 292 LBS | HEART RATE: 76 BPM | OXYGEN SATURATION: 98 % | RESPIRATION RATE: 15 BRPM | TEMPERATURE: 96.4 F | DIASTOLIC BLOOD PRESSURE: 70 MMHG | BODY MASS INDEX: 48.65 KG/M2

## 2023-11-27 DIAGNOSIS — R23.2 HOT FLASHES: ICD-10-CM

## 2023-11-27 DIAGNOSIS — K76.0 HEPATIC STEATOSIS: ICD-10-CM

## 2023-11-27 DIAGNOSIS — C50.911 MALIGNANT NEOPLASM OF RIGHT FEMALE BREAST, UNSPECIFIED ESTROGEN RECEPTOR STATUS, UNSPECIFIED SITE OF BREAST (MULTI): ICD-10-CM

## 2023-11-27 DIAGNOSIS — R73.03 PREDIABETES: Primary | ICD-10-CM

## 2023-11-27 PROBLEM — G35 MULTIPLE SCLEROSIS (MULTI): Status: RESOLVED | Noted: 2023-09-12 | Resolved: 2023-11-27

## 2023-11-27 PROCEDURE — 1036F TOBACCO NON-USER: CPT | Performed by: STUDENT IN AN ORGANIZED HEALTH CARE EDUCATION/TRAINING PROGRAM

## 2023-11-27 PROCEDURE — 99214 OFFICE O/P EST MOD 30 MIN: CPT | Performed by: STUDENT IN AN ORGANIZED HEALTH CARE EDUCATION/TRAINING PROGRAM

## 2023-11-27 PROCEDURE — 3008F BODY MASS INDEX DOCD: CPT | Performed by: STUDENT IN AN ORGANIZED HEALTH CARE EDUCATION/TRAINING PROGRAM

## 2023-11-27 ASSESSMENT — LIFESTYLE VARIABLES
AUDIT-C TOTAL SCORE: 1
SKIP TO QUESTIONS 9-10: 1
HOW MANY STANDARD DRINKS CONTAINING ALCOHOL DO YOU HAVE ON A TYPICAL DAY: 1 OR 2
HOW OFTEN DO YOU HAVE SIX OR MORE DRINKS ON ONE OCCASION: NEVER
HOW OFTEN DO YOU HAVE A DRINK CONTAINING ALCOHOL: MONTHLY OR LESS

## 2023-11-27 ASSESSMENT — PATIENT HEALTH QUESTIONNAIRE - PHQ9
1. LITTLE INTEREST OR PLEASURE IN DOING THINGS: NOT AT ALL
SUM OF ALL RESPONSES TO PHQ9 QUESTIONS 1 & 2: 0
2. FEELING DOWN, DEPRESSED OR HOPELESS: NOT AT ALL

## 2023-11-27 NOTE — PROGRESS NOTES
Subjective   Patient ID: Calvin Monroe is a pleasant 63 y.o. female who presents for Follow-up.  HPI  She is here to follow-up since her last visit.  During her last visit she had CT and ultrasound of the abdomen and right upper quadrant/gallbladder done.  The results were reviewed with the patient in details.  She has an upcoming appointment with hepatology for evaluation of hepatic steatosis and a liver lesion.  She has been following up with hematology for iron infusion.  Her most recent blood work results were reviewed with the patient.  Review of Systems   All other systems reviewed and are negative.      Visit Vitals  /70   Pulse 76   Temp 35.8 °C (96.4 °F)   Resp 15          Objective   Physical Exam  Constitutional:       General: She is not in acute distress.     Appearance: Normal appearance.   HENT:      Head: Normocephalic and atraumatic.   Eyes:      General: No scleral icterus.     Conjunctiva/sclera: Conjunctivae normal.   Cardiovascular:      Rate and Rhythm: Normal rate and regular rhythm.      Heart sounds: Normal heart sounds.   Pulmonary:      Effort: Pulmonary effort is normal.      Breath sounds: Normal breath sounds. No wheezing.   Abdominal:      General: Bowel sounds are normal. There is no distension.      Palpations: Abdomen is soft.      Tenderness: There is no abdominal tenderness.   Musculoskeletal:      Cervical back: Neck supple.      Right lower leg: No edema.      Left lower leg: No edema.   Lymphadenopathy:      Cervical: No cervical adenopathy.   Skin:     General: Skin is warm and dry.   Neurological:      General: No focal deficit present.      Mental Status: She is alert and oriented to person, place, and time.   Psychiatric:         Mood and Affect: Mood normal.         Behavior: Behavior normal.         Assessment/Plan   Problem List Items Addressed This Visit       Breast cancer (CMS/HCC)     On anastrazole          Prediabetes - Primary    Relevant Orders     Hemoglobin A1C     Other Visit Diagnoses       Hepatic steatosis      CT abdomen pelvis and right upper quadrant ultrasound reviewed with the patient.  Has a follow-up appointment with hepatology next month      Hot flashes      Currently taking venlafaxine for hot flashes  We discussed about increasing the dose to 75 mg as her symptoms are not controlled.

## 2023-12-07 ENCOUNTER — OFFICE VISIT (OUTPATIENT)
Dept: GASTROENTEROLOGY | Facility: HOSPITAL | Age: 63
End: 2023-12-07
Payer: COMMERCIAL

## 2023-12-07 ENCOUNTER — LAB (OUTPATIENT)
Dept: LAB | Facility: LAB | Age: 63
End: 2023-12-07
Payer: COMMERCIAL

## 2023-12-07 VITALS
DIASTOLIC BLOOD PRESSURE: 85 MMHG | HEIGHT: 65 IN | SYSTOLIC BLOOD PRESSURE: 134 MMHG | WEIGHT: 288.7 LBS | TEMPERATURE: 97.4 F | BODY MASS INDEX: 48.1 KG/M2 | HEART RATE: 81 BPM | RESPIRATION RATE: 18 BRPM | OXYGEN SATURATION: 93 %

## 2023-12-07 DIAGNOSIS — K76.0 FATTY LIVER: ICD-10-CM

## 2023-12-07 DIAGNOSIS — K76.9 LIVER LESION: ICD-10-CM

## 2023-12-07 DIAGNOSIS — R73.03 PREDIABETES: ICD-10-CM

## 2023-12-07 DIAGNOSIS — D50.9 IRON DEFICIENCY ANEMIA, UNSPECIFIED IRON DEFICIENCY ANEMIA TYPE: ICD-10-CM

## 2023-12-07 DIAGNOSIS — K76.0 HEPATIC STEATOSIS: ICD-10-CM

## 2023-12-07 LAB
EST. AVERAGE GLUCOSE BLD GHB EST-MCNC: 126 MG/DL
HBA1C MFR BLD: 6 %
HCV AB SER QL: NONREACTIVE

## 2023-12-07 PROCEDURE — 1036F TOBACCO NON-USER: CPT | Performed by: STUDENT IN AN ORGANIZED HEALTH CARE EDUCATION/TRAINING PROGRAM

## 2023-12-07 PROCEDURE — 99214 OFFICE O/P EST MOD 30 MIN: CPT | Mod: GC | Performed by: STUDENT IN AN ORGANIZED HEALTH CARE EDUCATION/TRAINING PROGRAM

## 2023-12-07 PROCEDURE — 36415 COLL VENOUS BLD VENIPUNCTURE: CPT

## 2023-12-07 PROCEDURE — 99214 OFFICE O/P EST MOD 30 MIN: CPT | Performed by: STUDENT IN AN ORGANIZED HEALTH CARE EDUCATION/TRAINING PROGRAM

## 2023-12-07 PROCEDURE — 83036 HEMOGLOBIN GLYCOSYLATED A1C: CPT

## 2023-12-07 PROCEDURE — 85027 COMPLETE CBC AUTOMATED: CPT

## 2023-12-07 PROCEDURE — 3008F BODY MASS INDEX DOCD: CPT | Performed by: STUDENT IN AN ORGANIZED HEALTH CARE EDUCATION/TRAINING PROGRAM

## 2023-12-07 PROCEDURE — 86803 HEPATITIS C AB TEST: CPT

## 2023-12-07 ASSESSMENT — ENCOUNTER SYMPTOMS
DEPRESSION: 0
LOSS OF SENSATION IN FEET: 0
OCCASIONAL FEELINGS OF UNSTEADINESS: 0

## 2023-12-07 ASSESSMENT — PAIN SCALES - GENERAL: PAINLEVEL: 0-NO PAIN

## 2023-12-07 ASSESSMENT — PATIENT HEALTH QUESTIONNAIRE - PHQ9
1. LITTLE INTEREST OR PLEASURE IN DOING THINGS: NOT AT ALL
SUM OF ALL RESPONSES TO PHQ9 QUESTIONS 1 AND 2: 0
2. FEELING DOWN, DEPRESSED OR HOPELESS: NOT AT ALL

## 2023-12-07 NOTE — PATIENT INSTRUCTIONS
Dear Ms. AveryPriyanka,    It was a pleasure meeting you in clinic today.    The liver lesion on your recent CT scan has been stable in size for 3 years now. It is most likely a benign lesion and we don't think it is causing your abdominal pain or that it needs further workup.    Hepatic steatosis means fatty liver. At this time it doesn't seem like the fatty liver is affecting the function of your liver (all your liver function tests are normal). Please read below about fatty liver.    Fatty liver disease:     Lifestyle modifications are the cornerstone of treatment for fatty liver disease. These include optimizing care for diabetes, high cholesterol and obesity. Patients who are overweight or obese should lose weight to prevent fat accumulation in the liver, which can lead to inflammation and irreversible scarring (cirrhosis). Unchecked liver inflammation and scarring can increase the risk of developing liver cancer and cardiovascular complications, such as heart attack and stroke.    -Aim to lose between 5-10% of total body weight over the next 6 months. This can be achieved by adhering to a very low carbohydrate diet (60 g/day), high in lean protein and vegetables (variant of the Mediterranean diet). Reducing portion sizes can also make weight loss easier. Mild-to-moderate intensity cardiovascular and/or resistance exercise (30 min, 3-4 days/week) paired with dietary modifications is helpful, as long as such an exercise regimen is permitted by your cardiologist or primary care provider.    -Small studies have shown that up to 3 cups of black coffee (no sugar, no cream) can be beneficial for liver health in patients with fatty liver.    -There is no known safe amount of alcohol use in fatty liver disease. Avoidance is recommended.    -Smoking should also be avoided as it has been independently associated with the development of fatty liver disease and predisposes to insulin resistance and diabetes.     Please get  the blood work (hepatitis C) done. Once the results are available we will let you know.    Please return to clinic as needed.    Best regards,    Celia Lizarraga MD  Division of Gastroenterology and Liver Disease  [address] [place] [address] [id number]  [place] [address]  Appt. Phone   Inquiries   Fax  or

## 2023-12-07 NOTE — PROGRESS NOTES
PCP: Antonella Lagunas MD   Referred:     Calvin Monroe is a 63 y.o. female with PMH of Class 3 obesity, IBS, umbilical hernia repair, invasive lobular carcinoma (stage IA) s/p partial mastectomy, and MS, presenting with recent CT imaging showing hypodense liver lesion and fatty liver.    History Of Present Illness:  She denies nausea, vomiting, or ever being told that she has a liver lesion or chronic liver disease.     12-point ROS was reviewed and is otherwise negative.    Pertinent Procedures:  - 10/10/23 EGD:  Impression  The esophagus appeared normal.  The stomach appeared normal.  The duodenal bulb and 2nd part of the duodenum appeared normal. Performed 6 random biopsies to rule out celiac disease.        Findings  The esophagus appeared normal. Z-line is 37 cm from the incisors.  The stomach appeared normal.  The duodenal bulb and 2nd part of the duodenum appeared normal. Performed 6 random biopsies using biopsy forceps to rule out celiac disease.    - 10/10/23 colonoscopy:  Impression  The terminal ileum appeared normal.  Hemorrhoids  Normal.        Findings  The terminal ileum appeared normal.  Internal hemorrhoids observed during retroflexion; no bleeding was identified  Exam otherwise normal    Pertinent GI Imaging:  - 09/26/23 CT A/P w/ IV contrast:  No acute abnormality in the abdomen or pelvis.  Hypodense liver lesion. The stability since 2020 indicating benign  etiology.    - 09/26/2023 US gallbladder:  Diffusely increased hepatic echogenicity may be seen with hepatic  steatosis or hepatocellular disease.    Family History:  - grandmother - stomach and pancreas Ca  - paternal grandmother - breast Ca  - paternal uncle - colon Ca    Social History:  -Lives with her .  -EtOH: rare  -Recreational drugs: denied; denied IVDU  -Tobacco: denied  -Denied having tattoos. Denied OTC medications and herbal supplements.    Past Medical History:  She has a past medical history of Anxiety disorder,  unspecified, Bilious vomiting (04/26/2016), Breast CA (CMS/HCC), Headache, unspecified (02/03/2017), Irritable bowel syndrome with diarrhea (04/07/2016), Mammographic calcification found on diagnostic imaging of breast (06/01/2015), ARIAN (obstructive sleep apnea), Overactive bladder, Personal history of diseases of the blood and blood-forming organs and certain disorders involving the immune mechanism, Personal history of diseases of the skin and subcutaneous tissue (07/27/2018), Personal history of other diseases of the female genital tract (08/01/2017), Personal history of other mental and behavioral disorders, and Personal history of other specified conditions.    Home Medications:  Current Outpatient Medications   Medication Instructions    anastrozole (Arimidex) 1 mg tablet 1 tablet, oral, Daily    furosemide (Lasix) 20 mg tablet 1 tablet, oral, Daily PRN    venlafaxine (EFFEXOR) 37.5 mg, oral, Daily        Surgical History:  She has a past surgical history that includes Other surgical history (10/17/2014); Hernia repair (10/17/2014); Other surgical history (08/03/2021); MR angio head wo IV contrast (06/12/2021); MR angio neck wo IV contrast (06/12/2021); and Breast lumpectomy (Right, 08/2021).     Social History:  She reports that she has never smoked. She has never used smokeless tobacco. She reports current alcohol use. She reports that she does not use drugs.    Family History:  Family History   Problem Relation Name Age of Onset    Other (murder) Mother      Alzheimer's disease Father      Diabetes Father      Lung cancer Father      Asthma Brother      Stomach cancer Maternal Grandmother      Breast cancer Paternal Grandmother      Other (cardiac disorder) Other      Stroke Other      Ovarian cancer Other      Breast cancer Other          Allergies:  Adhesive tape-silicones, Latex, Meperidine, and Morphine    OBJECTIVES:  Vital Signs:  Blood pressure 134/85, pulse 81, temperature 36.3 °C (97.4 °F), resp.  "rate 18, height 1.651 m (5' 5\"), weight 131 kg (288 lb 11.2 oz), SpO2 93 %.    Physical Exam:   General: Patient is in NAD.  Neuro: A and O x 3, CN 1-12 grossly intact.  HEENT: PERRLA.  CV: RRR, no m/r/g.  Pulm: CTA BL, no crackles, or wheezes.  Abd: Obese, soft, NTTP, no rebound or guarding.  Psych: Appropriate mood and behavior.    Assessment/Plan     Calvin Monroe is a 63 y.o. female with PMH of Class 3 obesity, IBS, umbilical hernia repair, invasive lobular carcinoma (stage IA) s/p partial mastectomy, and MS, presenting with recent CT imaging showing hypodense liver lesion and fatty liver.    Recent CT imaging showing hypodense liver lesion which has been stable in size for 3 years now. Most likely this lesion is benign in nature and doesn't contribute to her abdominal pain. No further workup is necessary at this time.    I did discuss with the patient what is fatty liver and that the treatment is lifestyle changes (healthy diet, increased physical activity/exercise, and weight loss). At this time her liver function is not affected by her fatty liver (plt/INR/AST/ALT/alk phos/T bili/alb wnl), I did explain that this can change if the fatty liver is not addressed now and it might even progress to fibrosis and cirrhosis.    I did offer a screening hepatitis C. She agreed with this.     RTC PRN.    Celia Lizarraga MD    "

## 2023-12-08 LAB
ERYTHROCYTE [DISTWIDTH] IN BLOOD BY AUTOMATED COUNT: 17.3 % (ref 11.5–14.5)
HCT VFR BLD AUTO: 38.8 % (ref 36–46)
HGB BLD-MCNC: 12 G/DL (ref 12–16)
MCH RBC QN AUTO: 25.5 PG (ref 26–34)
MCHC RBC AUTO-ENTMCNC: 30.9 G/DL (ref 32–36)
MCV RBC AUTO: 83 FL (ref 80–100)
NRBC BLD-RTO: 0 /100 WBCS (ref 0–0)
PLATELET # BLD AUTO: 346 X10*3/UL (ref 150–450)
RBC # BLD AUTO: 4.7 X10*6/UL (ref 4–5.2)
WBC # BLD AUTO: 7.2 X10*3/UL (ref 4.4–11.3)

## 2023-12-20 ENCOUNTER — LAB (OUTPATIENT)
Dept: LAB | Facility: LAB | Age: 63
End: 2023-12-20
Payer: COMMERCIAL

## 2023-12-20 ENCOUNTER — OFFICE VISIT (OUTPATIENT)
Dept: RHEUMATOLOGY | Facility: CLINIC | Age: 63
End: 2023-12-20
Payer: COMMERCIAL

## 2023-12-20 VITALS
HEIGHT: 65 IN | DIASTOLIC BLOOD PRESSURE: 89 MMHG | HEART RATE: 75 BPM | WEIGHT: 293 LBS | BODY MASS INDEX: 48.82 KG/M2 | TEMPERATURE: 97.5 F | SYSTOLIC BLOOD PRESSURE: 154 MMHG

## 2023-12-20 DIAGNOSIS — D50.8 OTHER IRON DEFICIENCY ANEMIA: ICD-10-CM

## 2023-12-20 DIAGNOSIS — D50.9 IRON DEFICIENCY ANEMIA, UNSPECIFIED IRON DEFICIENCY ANEMIA TYPE: ICD-10-CM

## 2023-12-20 DIAGNOSIS — M17.10 PRIMARY OSTEOARTHRITIS OF KNEE, UNSPECIFIED LATERALITY: Primary | ICD-10-CM

## 2023-12-20 DIAGNOSIS — M17.10 PRIMARY OSTEOARTHRITIS OF KNEE, UNSPECIFIED LATERALITY: ICD-10-CM

## 2023-12-20 LAB
CCP IGG SERPL-ACNC: <1 U/ML
CK SERPL-CCNC: 39 U/L (ref 0–215)
CRP SERPL-MCNC: 6.47 MG/DL
RHEUMATOID FACT SER NEPH-ACNC: <10 IU/ML (ref 0–15)

## 2023-12-20 PROCEDURE — 83516 IMMUNOASSAY NONANTIBODY: CPT

## 2023-12-20 PROCEDURE — 99214 OFFICE O/P EST MOD 30 MIN: CPT | Mod: GC | Performed by: STUDENT IN AN ORGANIZED HEALTH CARE EDUCATION/TRAINING PROGRAM

## 2023-12-20 PROCEDURE — 86200 CCP ANTIBODY: CPT

## 2023-12-20 PROCEDURE — 1036F TOBACCO NON-USER: CPT | Performed by: STUDENT IN AN ORGANIZED HEALTH CARE EDUCATION/TRAINING PROGRAM

## 2023-12-20 PROCEDURE — 86140 C-REACTIVE PROTEIN: CPT

## 2023-12-20 PROCEDURE — 86431 RHEUMATOID FACTOR QUANT: CPT

## 2023-12-20 PROCEDURE — 86036 ANCA SCREEN EACH ANTIBODY: CPT

## 2023-12-20 PROCEDURE — 99204 OFFICE O/P NEW MOD 45 MIN: CPT | Performed by: STUDENT IN AN ORGANIZED HEALTH CARE EDUCATION/TRAINING PROGRAM

## 2023-12-20 PROCEDURE — 36415 COLL VENOUS BLD VENIPUNCTURE: CPT

## 2023-12-20 PROCEDURE — 82550 ASSAY OF CK (CPK): CPT

## 2023-12-20 RX ORDER — DICLOFENAC SODIUM 10 MG/G
4 GEL TOPICAL 4 TIMES DAILY
Qty: 100 G | Refills: 1 | Status: SHIPPED | OUTPATIENT
Start: 2023-12-20 | End: 2024-02-08 | Stop reason: ALTCHOICE

## 2023-12-20 ASSESSMENT — PAIN SCALES - GENERAL: PAINLEVEL: 7

## 2023-12-20 NOTE — PROGRESS NOTES
Subjective   Patient ID: [id number]   Calvin Monroe is a 63 y.o. female who presents for No chief complaint on file..  HPI  64 yo with hx of invasive lobular carcinoma (stage IA) s/p partial mastectomy s/p radiation therapy on anastrazole, and MS, prediabetes, IBS, CRUZ is here for arthralgia evaluation in the setting of elevated CRP.       Interval hx:  Started after she had radiation a year ago.   Difficulty walking, or standing, feels week in her legs, imbalance.   Legs ache the most. Knees, calfs hurts. Mostly with activity.   Morning stiffness all day long.   Swelling in ankles and toes.  Shoulders right side hurts.   No pain in elbows and hands. Feels they are week.   External hips ache. Mostly with activity.   Intermittent low back pain with sciatica.   No skin rashes.  Mild SOB with exertion  No chest pain  No raynauds  Mild dry eyes  No dry mouth  No mouth or nose ulcers  No trouble swallowing  No dvt  Miscarriage  Leaking urine         Social history:  -She lives with  and has 3 healthy children and multiple grandchildren  -Homemaker  -She never smoked  -She does not drink other than wine occasionally marijuana Gummies for pain as needed recently  -No illicit drug use    Objective   Physical Exam  AO*4  Morbid obesity  Right shoulder with +NEER and hawking test, no effusion or warmth  Hands without tenderness or active synovitis   No spinal tenderness  Log roll and CHERI negative  Straight leg test + bilaterally  Knees cool to touch without effusion  Ankles and toes without active synovitis   Motor power 5/5    Workup done:    FINDINGS:  No lytic or sclerotic metastatic lesions are identified. Hypertrophic  degenerative endplate changes are noted to severe degree at the C4-5  and C5-6 levels. Moderate to severe disc height loss involves C5-6  and C6-7. Endplate spurs are also noted in the thoracic spine focally  bridging at T9-10 and T11-12 levels. Severe facet arthropathy is  present at L4-5  and L5-S1. Severe hypertrophic degenerative changes  are noted in both knees.      -CRP 3.35  -ESR 76  -WARD negative      Assessment/Plan   64 yo with hx of invasive lobular carcinoma (stage IA) s/p partial mastectomy s/p radiation therapy on anastrazole, and MS, prediabetes, IBS, CRUZ is here for arthralgia evaluation in the setting of elevated CRP.     Bone skeletal survey with advanced degenerative changes in the spine and knees.   No evidence of an autoimmune rheumatic disease based on her history and physical exam.   Her knee/leg pain is primarily secondary to osteoarthritis. Possible DJD of the spine contributing as well.     Plan:  -Referral to orthopedics   -RF, CCP ordered   -Can see physical therapy for knee/spine OA and for imbalance     UPDATE:  CRP doubled to 6mg/dl  Patient reported nights sweats over the past few months with temp running in the 100F. No weight loss.  Will get a PET-CT to rule out indolent infections, LVV, metastasis.  Patient agreable    Teetee Dodd MD   Rheumatology Fellow,PGY-V    Patient seen with Dr. Lew

## 2023-12-21 DIAGNOSIS — C50.919 MALIGNANT NEOPLASM OF FEMALE BREAST, UNSPECIFIED ESTROGEN RECEPTOR STATUS, UNSPECIFIED LATERALITY, UNSPECIFIED SITE OF BREAST (MULTI): Primary | ICD-10-CM

## 2023-12-22 ENCOUNTER — OFFICE VISIT (OUTPATIENT)
Dept: HEMATOLOGY/ONCOLOGY | Facility: CLINIC | Age: 63
End: 2023-12-22
Payer: COMMERCIAL

## 2023-12-22 ENCOUNTER — APPOINTMENT (OUTPATIENT)
Dept: HEMATOLOGY/ONCOLOGY | Facility: CLINIC | Age: 63
End: 2023-12-22
Payer: COMMERCIAL

## 2023-12-22 ENCOUNTER — ANCILLARY PROCEDURE (OUTPATIENT)
Dept: RADIOLOGY | Facility: CLINIC | Age: 63
End: 2023-12-22
Payer: COMMERCIAL

## 2023-12-22 ENCOUNTER — DOCUMENTATION (OUTPATIENT)
Dept: RHEUMATOLOGY | Facility: CLINIC | Age: 63
End: 2023-12-22
Payer: COMMERCIAL

## 2023-12-22 VITALS
BODY MASS INDEX: 48.62 KG/M2 | HEART RATE: 86 BPM | WEIGHT: 292.2 LBS | SYSTOLIC BLOOD PRESSURE: 118 MMHG | DIASTOLIC BLOOD PRESSURE: 79 MMHG

## 2023-12-22 DIAGNOSIS — R93.2 ABNORMAL CT SCAN, LIVER: ICD-10-CM

## 2023-12-22 DIAGNOSIS — C50.111 CANCER OF CENTRAL PORTION OF RIGHT BREAST (MULTI): Primary | ICD-10-CM

## 2023-12-22 DIAGNOSIS — T45.1X5A AROMATASE INHIBITOR-ASSOCIATED ARTHRALGIA: ICD-10-CM

## 2023-12-22 DIAGNOSIS — M25.50 AROMATASE INHIBITOR-ASSOCIATED ARTHRALGIA: ICD-10-CM

## 2023-12-22 DIAGNOSIS — Z79.811 AROMATASE INHIBITOR USE: ICD-10-CM

## 2023-12-22 PROCEDURE — 77067 SCR MAMMO BI INCL CAD: CPT | Mod: BILATERAL PROCEDURE | Performed by: STUDENT IN AN ORGANIZED HEALTH CARE EDUCATION/TRAINING PROGRAM

## 2023-12-22 PROCEDURE — 99214 OFFICE O/P EST MOD 30 MIN: CPT | Performed by: NURSE PRACTITIONER

## 2023-12-22 PROCEDURE — 77067 SCR MAMMO BI INCL CAD: CPT

## 2023-12-22 PROCEDURE — 77063 BREAST TOMOSYNTHESIS BI: CPT | Mod: BILATERAL PROCEDURE | Performed by: STUDENT IN AN ORGANIZED HEALTH CARE EDUCATION/TRAINING PROGRAM

## 2023-12-22 PROCEDURE — 1036F TOBACCO NON-USER: CPT | Performed by: NURSE PRACTITIONER

## 2023-12-22 ASSESSMENT — PAIN SCALES - GENERAL: PAINLEVEL: 0-NO PAIN

## 2023-12-22 NOTE — PROGRESS NOTES
Oncology Follow-Up    Calvin Monroe  [id number]          Breast         AJCC Edition: 8th (AJCC), Diagnosis Date: 27-Jul-2021, IA, pT1b pN0 cM0 G2  Oncology History    No history exists.     Treatment Synopsis:    61-year-old postmenopausal AA female with rightt-sided invasive lobular carcinoma, stage IA (T1b N0 M0). The patient's breast cancer was diagnosed on July 27, 2021,  and is estrogen receptor positive at >95%, progesterone receptor positive at >95%, and HER-2/xiao negative, grade 2 with MammaPrint Index = +0.120; translating to Low Risk Luminal A with a 10-year risk of recurrence of 10%. Details of her history are as  follows:       07/21/2021: Patient underwent a bilateral MRI for screening due to dense breast. This showed a mass measuring 0.8 x 0.8 x 0.7 cm mass. No axillary  or internal mammary LN appreciated   07/27/2021: Second look targeted US of the right breast and axilla-revealed 5 normal looking LNs.    07/27/2021: Patient underwent a US-guided biopsy of the right breast at 12:00, 4 cm from the nipple. Pathology was consistent with results as above   08/19/2021: Patient underwent a right partial mastectomy with SLNB. Pathology showed a 0.6cminvasive carcinoma, grade 2, with 0/2 SLNs involved. Margins were negative   2/23/2022: Completed Radiation   03/03/2022: Started Arimidex         Subjective    Fouzia presents for her Routine follow up visit. I was contacted earlier today by rheumatology as Fouzia has had severe arthralgia. She has had bone x-rays and chest x-rays, and a CT of her abdomen and pelvis. The only thing found was something on her liver that warrants further investigation. She is scheduled for a PET scan though that is on hold for now. Fouzia did not contact our office with these issues. She rates her energy level as 1/10 and is in some distress due to her health issues. She reports urinary urgency with some incontinence. She is not doing monthly Breast self exams.  Fouzia sees Dr. Galeano in primary care. Fouzia denies any unusual headaches, balance issues, depression, cough, shortness of breath, problems swallowing, changes in chest/breast area, abdominal pain, vaginal bleeding, rectal bleeding, blood in the urine, vaginal dryness, swelling arms or legs, new or unusual skin moles or lesions.         Objective      Vitals:    12/22/23 1512   BP: 118/79   Pulse: 86        Constitutional: Well developed, alert/oriented x3, no distress, cooperative   Eyes: clear sclera   ENMT: mucous membranes moist, no apparent lesions   Head/Neck: Neck supple, no bruits   Respiratory/Thorax: Patent airways, normal breath sounds with good chest expansion   Cardiovascular: Regular rate and rhythm, no murmurs, 2+ equal pulses of the extremities,   Gastrointestinal: Nondistended, soft, non-tender, no masses palpable, no organomegaly   Musculoskeletal: ROM intact, no joint swelling, normal strength   Extremities: normal extremities, no edema, cyanosis, contusions or wounds   Neurological: alert and oriented x3,  normal strength   Breast:    Lymphatic: No significant lymphadenopathy   Psychological: Appropriate mood and behavior   Skin: Warm and dry, no lesions, no rashes      Physical Exam  Chest:          Comments: Right breast + for breast conserving surgery with well healed central/superior and right axillary incisions; no masses, nodules, skin changes, discharge. Left breast without masses, nodules, skin changes, discharge.          Lab Results   Component Value Date    WBC 7.2 12/07/2023    HGB 12.0 12/07/2023    HCT 38.8 12/07/2023    MCV 83 12/07/2023     12/07/2023       Chemistry    Lab Results   Component Value Date/Time     10/26/2023 1523    K 4.1 10/26/2023 1523     10/26/2023 1523    CO2 29 10/26/2023 1523    BUN 13 10/26/2023 1523    CREATININE 0.67 10/26/2023 1523    Lab Results   Component Value Date/Time    CALCIUM 9.2 10/26/2023 1523    ALKPHOS 84 10/26/2023  1523    AST 13 10/26/2023 1523    ALT 11 10/26/2023 1523    BILITOT 0.3 10/26/2023 1523              Imaging:  Interpreted By:  Edgar Veronica,   STUDY:  BI MAMMO BILATERAL SCREENING TOMOSYNTHESIS;  12/22/2023 3:14 pm      ACCESSION NUMBER(S):  [id number]      ORDERING CLINICIAN:  KYLE HICKMAN      INDICATION:  Screening. History of right breast cancer treated with lumpectomy  radiation. Family history of breast cancer.      COMPARISON:  12/21/2022, and all relevant prior breast imaging exams available at  the time of dictation.      FINDINGS:  2D and tomosynthesis images were reviewed at 1 mm slice thickness.      Density:  The breast tissue is heterogeneously dense, which may  obscure small masses.      There are postsurgical changes of lumpectomy with scarring and  surgical clips in central right breast at anterior depth. No  suspicious masses or calcifications are identified in either breast.      IMPRESSION:  No mammographic evidence of malignancy.      BI-RADS CATEGORY:      BI-RADS Category:  2 Benign.  Recommendation:  Routine Screening Mammogram in 1 Year.  Recommended Date:  1 Year          Assessment/Plan    Fouzia is a 64 yo woman with a hx of T1bN0 right breast cancer diagnosed in July 2021. She is s/p partial mastectomy, XRT, and is currently on anastrozole daily with poor tolerance. There is no evidence of recurrent disease on today's exam.   Exam and mammogram are negative.  Stop anastrozole now for 3 weeks. She will reach out to me via [employer] of by phone to report on how she is feeling. If arthralgia significantly improved, we will assume it is from the anastrozole and switch her to another Aromatase inhibitor. If no improvement, will refer back to rheumatology for further work up.  I will refer her to Dr. Sutton in uro-gyn due to urgency/incontinence.  An ultrasound will better determine the area in question on her CT scan. I will place that order.  Encouraged monthly breast self exams,  plant based diet, keep alcohol <3 drinks/week, exercise at least 2.5 hours/week.  We reviewed signs/symptoms of recurrence including new masses, new pigmented lesion, tugging or pulling of the skin, nipple discharge, rash in or around the chest area, or any new finding that doesn't resolve within a 2-3 weeks.  All of Fouzia's questions/concerns were addressed.  Over 30 minutes of time was spent with this patient with >50% of the time with education, counseling, and coordination of care.   I will talk with her in a few weeks.    Diagnoses and all orders for this visit:  Cancer of central portion of right breast (CMS/HCC)  Aromatase inhibitor use  Aromatase inhibitor-associated arthralgia          Kay Lua, APRN-CNP

## 2023-12-22 NOTE — PATIENT INSTRUCTIONS
1. Exercise 2.5 hours per week; bone strengthening, cardio-vascular, resistance training.  2. Please do self breast exams monthly.  3. Keep alcohol under 3 drinks per week.  4. Sun safety - limit sun exposure from 11a-2p when its at its hottest, apply 15-30 sun block and re-apply every 1-2 hours if perspiring or swimming.  5. Eat a plant based diet, add in oily fishes such as mackerel, tuna, and salmon.  6. Get in at least 1,000 mg of calcium per day through diet or supplement for bone strength. Examples of foods higher in calcium are milk, yogurt, fruited yogurt, oranges, fortified orange juice, almonds, almond milk, broccoli, spinach, bok jenny, mustard greens, puddings, custards, ice cream, fortified cereals, bars, and crackers.   7. Please stop anastrozole today. Stay off of it for 3 weeks. Please reach out to me via phone or [employer] to report on how your are feeling. If there is significant improvement in your joint pain we will switch to another medication (aromatase inhibitor). If there is no relief, I will refer you back to rheumatology.   8. Please call the office if any new mass or rash in or around breast, or any uncontrolled symptoms that last over 2-3 weeks at [phone].  9. Please schedule your liver ultrasound. I will call with those results.  10. It was nice seeing you today, Fouzia. I will talk with you in a few weeks.  Have a Happy, Healthy, Holiday Season!  Thank you for choosing [facility] for your care.

## 2023-12-22 NOTE — PROGRESS NOTES
Will hold off getting a PET-CT.  After reviewing the literature, noticed that anastrozole can cause the symptoms Ms Simpson is having, and her symptoms started after she was started on the medication.    Anastrozole can cause elevated of CRP as well.  Discussed with Dr. Burris, who's agreeable to hold for a month and reevaluate the symptoms.   Patient notified.

## 2023-12-29 LAB
ANCA AB PATTERN SER IF-IMP: NORMAL
ANCA IGG TITR SER IF: NORMAL {TITER}
MYELOPEROXIDASE AB SER-ACNC: 0 AU/ML (ref 0–19)
PROTEINASE3 AB SER-ACNC: 0 AU/ML (ref 0–19)

## 2024-01-03 ENCOUNTER — APPOINTMENT (OUTPATIENT)
Dept: ORTHOPEDIC SURGERY | Facility: CLINIC | Age: 64
End: 2024-01-03
Payer: COMMERCIAL

## 2024-01-09 ENCOUNTER — HOSPITAL ENCOUNTER (OUTPATIENT)
Dept: CARDIOLOGY | Facility: CLINIC | Age: 64
Discharge: HOME | End: 2024-01-09
Payer: COMMERCIAL

## 2024-01-09 ENCOUNTER — ANCILLARY PROCEDURE (OUTPATIENT)
Dept: RADIOLOGY | Facility: CLINIC | Age: 64
End: 2024-01-09
Payer: COMMERCIAL

## 2024-01-09 DIAGNOSIS — R93.2 ABNORMAL CT SCAN, LIVER: ICD-10-CM

## 2024-01-09 DIAGNOSIS — I77.819 AORTIC DILATATION (CMS-HCC): ICD-10-CM

## 2024-01-09 DIAGNOSIS — Z01.89 ENCOUNTER FOR OTHER SPECIFIED SPECIAL EXAMINATIONS: ICD-10-CM

## 2024-01-09 DIAGNOSIS — C50.111 CANCER OF CENTRAL PORTION OF RIGHT BREAST (MULTI): ICD-10-CM

## 2024-01-09 LAB
AORTIC VALVE MEAN GRADIENT: 4.2
AORTIC VALVE PEAK VELOCITY: 1.39
AV PEAK GRADIENT: 7.7
AVA (PEAK VEL): 2.36
AVA (VTI): 2.95
EJECTION FRACTION APICAL 4 CHAMBER: 61.7
EJECTION FRACTION: 63
LEFT ATRIUM VOLUME AREA LENGTH INDEX BSA: 21
LEFT VENTRICLE INTERNAL DIMENSION DIASTOLE: 4.86 (ref 3.5–6)
LEFT VENTRICULAR OUTFLOW TRACT DIAMETER: 2.15
MITRAL VALVE E/A RATIO: 1.23
RIGHT VENTRICLE FREE WALL PEAK S': 0.11
TRICUSPID ANNULAR PLANE SYSTOLIC EXCURSION: 2.5

## 2024-01-09 PROCEDURE — 93306 TTE W/DOPPLER COMPLETE: CPT

## 2024-01-09 PROCEDURE — 76705 ECHO EXAM OF ABDOMEN: CPT

## 2024-01-09 PROCEDURE — 76705 ECHO EXAM OF ABDOMEN: CPT | Performed by: RADIOLOGY

## 2024-01-09 PROCEDURE — 93306 TTE W/DOPPLER COMPLETE: CPT | Performed by: INTERNAL MEDICINE

## 2024-01-10 DIAGNOSIS — M17.10 PRIMARY OSTEOARTHRITIS OF KNEE, UNSPECIFIED LATERALITY: Primary | ICD-10-CM

## 2024-01-10 NOTE — PROGRESS NOTES
"Primary Care Physician: Antonella Lagunas MD  Date of Visit: 01/12/2024 11:40 AM EST  Location of visit: [place] [address]     Chief Complaint:   1 yr Follow up     HPI / Summary:   Calvin is a 63 year old female, s/p lumpectomy and axillary node dissection (Aug 2021) and radiation (Feb 2022) for invasive lobular breast carcinoma.    She was seen previously for shortness of breath and fatigue. She underwent testing as below that included exercise nuclear stress test that was negative for ischemia, PFTs were normal. Sleep study revealed severe sleep apnea and she has since been started on CPAP. She has noticed significant improvement in restful sleep. Improved energy levels.  Dyspnea also improved significantly.    Interval events:  Was in ER yesterday with right arm discomfort.  Possibly musculoskeletal. Given prednisone and muscle relaxant for \"pinched nerve.\"  Cardiac workup negative including troponin, BNP and EKG.  Elevated D-dimer so CT PE was done which is negative for any acute process and no pulmonary embolus. Says energy level much improved with cpap. I also feel better since anastrazole stopped.      ECHO 1/9/2024: LVEF 55-60%, ascending aorta 3.8 cm  ECHO 9/1/2022: LVEF 65%, aortic root mildly enlarged 4.0 cm  Exercise stress SPECT 11/2022. Reduced functional capacity 4.6 METS, switched to Lexiscan. No evidence of ischemia     Sleep study 12/22: Severe obstructive sleep apnea     PFTs 12/22: Normal. No obstruction, restriction or abnormal DLCO    CT PE 9/26/2022: No PE, normal thoracic aorta size. No dissection.         Past Medical History:  Past Medical History:   Diagnosis Date    Anxiety disorder, unspecified     Anxiety    Bilious vomiting 04/26/2016    Bilious vomiting with nausea    Breast CA (CMS/HCC)     Headache, unspecified 02/03/2017    Headache    Irritable bowel syndrome with diarrhea 04/07/2016    Irritable bowel syndrome with diarrhea    Mammographic calcification found on diagnostic imaging " of breast 06/01/2015    Breast calcification, right    ARIAN (obstructive sleep apnea)     Overactive bladder     OAB (overactive bladder)    Personal history of diseases of the blood and blood-forming organs and certain disorders involving the immune mechanism     History of anemia    Personal history of diseases of the skin and subcutaneous tissue 07/27/2018    History of cyst of breast    Personal history of other diseases of the female genital tract 08/01/2017    History of breast pain    Personal history of other mental and behavioral disorders     History of depression    Personal history of other specified conditions     History of lump of left breast        Past Surgical History:  Past Surgical History:   Procedure Laterality Date    BREAST LUMPECTOMY Right 08/2021    HERNIA REPAIR  10/17/2014    Hernia Repair    MR HEAD ANGIO WO IV CONTRAST  06/12/2021    MR HEAD ANGIO WO IV CONTRAST 6/12/2021 [facility]    MR NECK ANGIO WO IV CONTRAST  06/12/2021    MR NECK ANGIO WO IV CONTRAST 6/12/2021 [facility]    OTHER SURGICAL HISTORY  10/17/2014    Laparoscopic Excision Of Ectopic Pregnancy And Salpingectomy    OTHER SURGICAL HISTORY  08/03/2021    Knee arthroscopy          Social History:  She reports that she has never smoked. She has never used smokeless tobacco. She reports current alcohol use. She reports that she does not use drugs.    Family History:  family history includes Alzheimer's disease in her father; Asthma in her brother; Breast cancer in her paternal grandmother and another family member; Diabetes in her father; Lung cancer in her father; Ovarian cancer in an other family member; Stomach cancer in her maternal grandmother; Stroke in an other family member; cardiac disorder in an other family member; murder in her mother.      Allergies:  Allergies   Allergen Reactions    Adhesive Tape-Silicones Itching    Latex Hives    Meperidine Other     GI upset    Morphine Other     GI upset        Outpatient Medications:  Current Outpatient Medications   Medication Instructions    anastrozole (Arimidex) 1 mg tablet 1 tablet, oral, Daily    diclofenac sodium (Voltaren) 1 % gel gel 1 Application, Topical, 4 times daily    furosemide (Lasix) 20 mg tablet 1 tablet, oral, Daily PRN    venlafaxine (EFFEXOR) 37.5 mg, oral, Daily       Physical Exam:  GENERAL: alert, cooperative, pleasant, in no acute distress  SKIN: warm, dry, no rash.  NECK: no JVD, no KELLEE  CARDIAC: Regular rate and rhythm with no rubs, murmurs, or gallops  CHEST: Normal respiratory efforts, lungs clear to auscultation bilaterally.  ABDOMEN: soft, nontender, nondistended  EXTREMITIES: no edema  NEURO: Alert and oriented x 3.  Grossly normal.  Moves all 4 extremities.    Vitals:    01/12/24 1146   BP: 141/85   BP Location: Left arm   Pulse: 91   SpO2: 95%   Weight: 133 kg (293 lb)     Wt Readings from Last 5 Encounters:   12/22/23 133 kg (292 lb 3.2 oz)   12/20/23 135 kg (297 lb)   12/07/23 131 kg (288 lb 11.2 oz)   11/27/23 132 kg (292 lb)   11/09/23 131 kg (288 lb 9.3 oz)     Body mass index is 48.76 kg/m².        Last Labs:  CMP:  Recent Labs     10/26/23  1523 09/12/23  1137 09/26/22  1647    141 141   K 4.1 4.6 3.5    103 103   CO2 29 27 29   ANIONGAP 12 16 13   BUN 13 12 18   CREATININE 0.67 0.64 0.79   EGFR >90  --   --    GLUCOSE 132* 106* 100*     Recent Labs     10/26/23  1523 09/12/23  1137 09/26/22  1647   ALBUMIN 3.7 3.8 4.1   ALKPHOS 84 85 81   ALT 11 12 13   AST 13 20 14   BILITOT 0.3 0.5 0.3   LIPASE  --  18  --      CBC:  Recent Labs     12/07/23  1729 11/03/23  1324 10/26/23  1523   WBC 7.2 7.1 6.5   HGB 12.0 10.7* 10.8*   HCT 38.8 35.4* 36.6    293 319   MCV 83 82 81     COAG:   Recent Labs     09/26/22  1714 06/09/22  2058 06/12/21  0058   INR  --  1.0 1.0   DDIMERVTE 513* 565*  --      ENDO:  Recent Labs     12/07/23  1729 08/01/22  1319 06/13/21  0416   TSH  --  0.48  --    HGBA1C 6.0* 6.3* 5.9       CARDIAC:   Recent Labs     10/26/23  1523 09/26/22  1647 06/09/22  2144 06/09/22  2058     --   --   --    TROPHS  --   --  3 <3   BNP  --  7  --   --      Recent Labs     09/12/23  1137 08/01/22  1319   CHOL 172 180   LDLF 99  --    HDL 47.2 61.3   TRIG 128  --      No data recorded        Assessment/Plan   63-year-old female with history of lumpectomy and axillary node dissection (Aug 2021) and radiation (Feb 2022) for invasive lobular breast carcinoma, severe ARIAN (now on CPAP), aortic root enlargement, morbid obesity     1. Shortness of breath  consistent with severe sleep apnea and obesity. Stress test negative for ischemia. Normal PFTs. Continue to follow with sleep medicine. Much improved with cpap.     2. Chronic bilateral lower extremity edema m/l due to venous insufficiency  Rarely needing furosemide now that off anastrazole     3. Aortic root mildly enlarged 4.0 cm - echo sept 2022.  Measures 3.8 on echo this year.  Likely will be getting regular CT scans due to her breast cancer history and this can be monitored that way.  Low suspicion will ever progressed anything significant.      No need for cardiology follow-up at this point.  Happy to see again if needed in the future.              Followup Appts:  Future Appointments   Date Time Provider Department Center   1/24/2024  1:15 PM ZAID Coe-CNP [id number]   2/8/2024  1:30 PM ZAID Brandt-CNP [id number] [facility]   2/8/2024  2:00 PM INF 13 GEAUGA SCCGEAINF [facility]   2/15/2024  2:30 PM INF 11 GEAUGA SCCGEAINF [facility]   3/15/2024  2:10 PM Antonella Lagunas MD [id number] [facility]   4/10/2024  3:00 PM Teetee Dodd MD [id number] [facility]   9/7/2024 11:00 AM Jung Hess OD [id number] [facility]           ____________________________________________________________  [facility]  [facility]

## 2024-01-11 ENCOUNTER — APPOINTMENT (OUTPATIENT)
Dept: RADIOLOGY | Facility: HOSPITAL | Age: 64
End: 2024-01-11
Payer: COMMERCIAL

## 2024-01-11 ENCOUNTER — APPOINTMENT (OUTPATIENT)
Dept: CARDIOLOGY | Facility: HOSPITAL | Age: 64
End: 2024-01-11
Payer: COMMERCIAL

## 2024-01-11 ENCOUNTER — HOSPITAL ENCOUNTER (EMERGENCY)
Facility: HOSPITAL | Age: 64
Discharge: HOME | End: 2024-01-12
Attending: EMERGENCY MEDICINE
Payer: COMMERCIAL

## 2024-01-11 DIAGNOSIS — M54.12 CERVICAL RADICULOPATHY: Primary | ICD-10-CM

## 2024-01-11 LAB
ALBUMIN SERPL BCP-MCNC: 3.6 G/DL (ref 3.4–5)
ALP SERPL-CCNC: 76 U/L (ref 33–136)
ALT SERPL W P-5'-P-CCNC: 13 U/L (ref 7–45)
ANION GAP SERPL CALC-SCNC: 13 MMOL/L (ref 10–20)
APTT PPP: 32 SECONDS (ref 27–38)
AST SERPL W P-5'-P-CCNC: 14 U/L (ref 9–39)
BASOPHILS # BLD AUTO: 0.04 X10*3/UL (ref 0–0.1)
BASOPHILS NFR BLD AUTO: 0.6 %
BILIRUB SERPL-MCNC: 0.3 MG/DL (ref 0–1.2)
BNP SERPL-MCNC: 10 PG/ML (ref 0–99)
BUN SERPL-MCNC: 16 MG/DL (ref 6–23)
CALCIUM SERPL-MCNC: 9.2 MG/DL (ref 8.6–10.3)
CARDIAC TROPONIN I PNL SERPL HS: <3 NG/L (ref 0–13)
CHLORIDE SERPL-SCNC: 103 MMOL/L (ref 98–107)
CO2 SERPL-SCNC: 27 MMOL/L (ref 21–32)
CREAT SERPL-MCNC: 0.69 MG/DL (ref 0.5–1.05)
D DIMER PPP FEU-MCNC: 966 NG/ML FEU
EGFRCR SERPLBLD CKD-EPI 2021: >90 ML/MIN/1.73M*2
EOSINOPHIL # BLD AUTO: 0.2 X10*3/UL (ref 0–0.7)
EOSINOPHIL NFR BLD AUTO: 3 %
ERYTHROCYTE [DISTWIDTH] IN BLOOD BY AUTOMATED COUNT: 17.2 % (ref 11.5–14.5)
FLUAV RNA RESP QL NAA+PROBE: NOT DETECTED
FLUBV RNA RESP QL NAA+PROBE: NOT DETECTED
GLUCOSE SERPL-MCNC: 113 MG/DL (ref 74–99)
HCT VFR BLD AUTO: 39 % (ref 36–46)
HGB BLD-MCNC: 12 G/DL (ref 12–16)
IMM GRANULOCYTES # BLD AUTO: 0.01 X10*3/UL (ref 0–0.7)
IMM GRANULOCYTES NFR BLD AUTO: 0.1 % (ref 0–0.9)
INR PPP: 1 (ref 0.9–1.1)
LYMPHOCYTES # BLD AUTO: 2.57 X10*3/UL (ref 1.2–4.8)
LYMPHOCYTES NFR BLD AUTO: 38.4 %
MAGNESIUM SERPL-MCNC: 1.8 MG/DL (ref 1.6–2.4)
MCH RBC QN AUTO: 25.7 PG (ref 26–34)
MCHC RBC AUTO-ENTMCNC: 30.8 G/DL (ref 32–36)
MCV RBC AUTO: 84 FL (ref 80–100)
MONOCYTES # BLD AUTO: 0.57 X10*3/UL (ref 0.1–1)
MONOCYTES NFR BLD AUTO: 8.5 %
NEUTROPHILS # BLD AUTO: 3.3 X10*3/UL (ref 1.2–7.7)
NEUTROPHILS NFR BLD AUTO: 49.4 %
NRBC BLD-RTO: 0 /100 WBCS (ref 0–0)
PLATELET # BLD AUTO: 291 X10*3/UL (ref 150–450)
POTASSIUM SERPL-SCNC: 3.9 MMOL/L (ref 3.5–5.3)
PROT SERPL-MCNC: 6.9 G/DL (ref 6.4–8.2)
PROTHROMBIN TIME: 11.6 SECONDS (ref 9.8–12.8)
RBC # BLD AUTO: 4.67 X10*6/UL (ref 4–5.2)
SARS-COV-2 RNA RESP QL NAA+PROBE: NOT DETECTED
SODIUM SERPL-SCNC: 139 MMOL/L (ref 136–145)
WBC # BLD AUTO: 6.7 X10*3/UL (ref 4.4–11.3)

## 2024-01-11 PROCEDURE — 85730 THROMBOPLASTIN TIME PARTIAL: CPT | Performed by: EMERGENCY MEDICINE

## 2024-01-11 PROCEDURE — 99285 EMERGENCY DEPT VISIT HI MDM: CPT | Performed by: EMERGENCY MEDICINE

## 2024-01-11 PROCEDURE — 84075 ASSAY ALKALINE PHOSPHATASE: CPT | Performed by: EMERGENCY MEDICINE

## 2024-01-11 PROCEDURE — 93005 ELECTROCARDIOGRAM TRACING: CPT

## 2024-01-11 PROCEDURE — 71275 CT ANGIOGRAPHY CHEST: CPT

## 2024-01-11 PROCEDURE — 85025 COMPLETE CBC W/AUTO DIFF WBC: CPT | Performed by: EMERGENCY MEDICINE

## 2024-01-11 PROCEDURE — 84484 ASSAY OF TROPONIN QUANT: CPT | Performed by: EMERGENCY MEDICINE

## 2024-01-11 PROCEDURE — 72125 CT NECK SPINE W/O DYE: CPT

## 2024-01-11 PROCEDURE — 85379 FIBRIN DEGRADATION QUANT: CPT | Performed by: EMERGENCY MEDICINE

## 2024-01-11 PROCEDURE — 36415 COLL VENOUS BLD VENIPUNCTURE: CPT | Performed by: EMERGENCY MEDICINE

## 2024-01-11 PROCEDURE — 2500000004 HC RX 250 GENERAL PHARMACY W/ HCPCS (ALT 636 FOR OP/ED): Performed by: EMERGENCY MEDICINE

## 2024-01-11 PROCEDURE — 85610 PROTHROMBIN TIME: CPT | Performed by: EMERGENCY MEDICINE

## 2024-01-11 PROCEDURE — 70450 CT HEAD/BRAIN W/O DYE: CPT

## 2024-01-11 PROCEDURE — 83880 ASSAY OF NATRIURETIC PEPTIDE: CPT | Performed by: EMERGENCY MEDICINE

## 2024-01-11 PROCEDURE — 2500000001 HC RX 250 WO HCPCS SELF ADMINISTERED DRUGS (ALT 637 FOR MEDICARE OP): Performed by: EMERGENCY MEDICINE

## 2024-01-11 PROCEDURE — 72125 CT NECK SPINE W/O DYE: CPT | Performed by: RADIOLOGY

## 2024-01-11 PROCEDURE — 71275 CT ANGIOGRAPHY CHEST: CPT | Mod: FOREIGN READ | Performed by: RADIOLOGY

## 2024-01-11 PROCEDURE — 87636 SARSCOV2 & INF A&B AMP PRB: CPT | Performed by: EMERGENCY MEDICINE

## 2024-01-11 PROCEDURE — 70450 CT HEAD/BRAIN W/O DYE: CPT | Performed by: RADIOLOGY

## 2024-01-11 PROCEDURE — 71045 X-RAY EXAM CHEST 1 VIEW: CPT | Mod: FOREIGN READ | Performed by: RADIOLOGY

## 2024-01-11 PROCEDURE — 2550000001 HC RX 255 CONTRASTS: Performed by: EMERGENCY MEDICINE

## 2024-01-11 PROCEDURE — 96375 TX/PRO/DX INJ NEW DRUG ADDON: CPT

## 2024-01-11 PROCEDURE — 71045 X-RAY EXAM CHEST 1 VIEW: CPT

## 2024-01-11 PROCEDURE — 83735 ASSAY OF MAGNESIUM: CPT | Performed by: EMERGENCY MEDICINE

## 2024-01-11 PROCEDURE — 96374 THER/PROPH/DIAG INJ IV PUSH: CPT

## 2024-01-11 RX ORDER — ORPHENADRINE CITRATE 30 MG/ML
60 INJECTION INTRAMUSCULAR; INTRAVENOUS ONCE
Status: COMPLETED | OUTPATIENT
Start: 2024-01-11 | End: 2024-01-11

## 2024-01-11 RX ORDER — OXYCODONE AND ACETAMINOPHEN 5; 325 MG/1; MG/1
1 TABLET ORAL ONCE
Status: COMPLETED | OUTPATIENT
Start: 2024-01-11 | End: 2024-01-11

## 2024-01-11 RX ADMIN — IOHEXOL 75 ML: 350 INJECTION, SOLUTION INTRAVENOUS at 23:36

## 2024-01-11 RX ADMIN — METHYLPREDNISOLONE SODIUM SUCCINATE 125 MG: 125 INJECTION, POWDER, FOR SOLUTION INTRAMUSCULAR; INTRAVENOUS at 20:51

## 2024-01-11 RX ADMIN — ORPHENADRINE CITRATE 60 MG: 60 INJECTION INTRAMUSCULAR; INTRAVENOUS at 20:51

## 2024-01-11 RX ADMIN — OXYCODONE HYDROCHLORIDE AND ACETAMINOPHEN 1 TABLET: 5; 325 TABLET ORAL at 20:51

## 2024-01-11 ASSESSMENT — PAIN DESCRIPTION - ORIENTATION: ORIENTATION: RIGHT

## 2024-01-11 ASSESSMENT — PAIN SCALES - GENERAL: PAINLEVEL_OUTOF10: 9

## 2024-01-11 ASSESSMENT — PAIN DESCRIPTION - LOCATION: LOCATION: ARM

## 2024-01-11 ASSESSMENT — PAIN - FUNCTIONAL ASSESSMENT: PAIN_FUNCTIONAL_ASSESSMENT: 0-10

## 2024-01-12 ENCOUNTER — OFFICE VISIT (OUTPATIENT)
Dept: CARDIOLOGY | Facility: CLINIC | Age: 64
End: 2024-01-12
Payer: COMMERCIAL

## 2024-01-12 ENCOUNTER — TELEPHONE (OUTPATIENT)
Dept: ADMISSION | Facility: HOSPITAL | Age: 64
End: 2024-01-12

## 2024-01-12 ENCOUNTER — LAB (OUTPATIENT)
Dept: LAB | Facility: LAB | Age: 64
End: 2024-01-12
Payer: COMMERCIAL

## 2024-01-12 VITALS
RESPIRATION RATE: 18 BRPM | HEIGHT: 65 IN | OXYGEN SATURATION: 93 % | HEART RATE: 68 BPM | DIASTOLIC BLOOD PRESSURE: 77 MMHG | TEMPERATURE: 97 F | SYSTOLIC BLOOD PRESSURE: 126 MMHG | BODY MASS INDEX: 48.65 KG/M2 | WEIGHT: 292 LBS

## 2024-01-12 VITALS
BODY MASS INDEX: 48.76 KG/M2 | OXYGEN SATURATION: 95 % | WEIGHT: 293 LBS | SYSTOLIC BLOOD PRESSURE: 141 MMHG | DIASTOLIC BLOOD PRESSURE: 85 MMHG | HEART RATE: 91 BPM

## 2024-01-12 DIAGNOSIS — G47.30 SEVERE SLEEP APNEA: ICD-10-CM

## 2024-01-12 DIAGNOSIS — R79.89 D-DIMER, ELEVATED: Primary | ICD-10-CM

## 2024-01-12 DIAGNOSIS — E66.01 MORBID OBESITY WITH BMI OF 45.0-49.9, ADULT (MULTI): ICD-10-CM

## 2024-01-12 DIAGNOSIS — I77.819 AORTIC DILATATION (CMS-HCC): Primary | ICD-10-CM

## 2024-01-12 DIAGNOSIS — Z85.3 HISTORY OF RIGHT BREAST CANCER: Primary | ICD-10-CM

## 2024-01-12 DIAGNOSIS — M17.10 PRIMARY OSTEOARTHRITIS OF KNEE, UNSPECIFIED LATERALITY: ICD-10-CM

## 2024-01-12 LAB
ATRIAL RATE: 85 BPM
CARDIAC TROPONIN I PNL SERPL HS: <3 NG/L (ref 0–13)
CRP SERPL-MCNC: 3.54 MG/DL
P AXIS: 42 DEGREES
P OFFSET: 184 MS
P ONSET: 120 MS
PR INTERVAL: 180 MS
Q ONSET: 210 MS
QRS COUNT: 14 BEATS
QRS DURATION: 82 MS
QT INTERVAL: 386 MS
QTC CALCULATION(BAZETT): 459 MS
QTC FREDERICIA: 433 MS
R AXIS: -1 DEGREES
T AXIS: 45 DEGREES
T OFFSET: 403 MS
VENTRICULAR RATE: 85 BPM

## 2024-01-12 PROCEDURE — 36415 COLL VENOUS BLD VENIPUNCTURE: CPT | Performed by: EMERGENCY MEDICINE

## 2024-01-12 PROCEDURE — 86481 TB AG RESPONSE T-CELL SUSP: CPT

## 2024-01-12 PROCEDURE — 86140 C-REACTIVE PROTEIN: CPT

## 2024-01-12 PROCEDURE — 1036F TOBACCO NON-USER: CPT | Performed by: INTERNAL MEDICINE

## 2024-01-12 PROCEDURE — 36415 COLL VENOUS BLD VENIPUNCTURE: CPT

## 2024-01-12 PROCEDURE — 99214 OFFICE O/P EST MOD 30 MIN: CPT | Performed by: INTERNAL MEDICINE

## 2024-01-12 PROCEDURE — 3008F BODY MASS INDEX DOCD: CPT | Performed by: INTERNAL MEDICINE

## 2024-01-12 PROCEDURE — 84484 ASSAY OF TROPONIN QUANT: CPT | Performed by: EMERGENCY MEDICINE

## 2024-01-12 RX ORDER — CYCLOBENZAPRINE HCL 10 MG
10 TABLET ORAL 3 TIMES DAILY PRN
Qty: 21 TABLET | Refills: 0 | Status: SHIPPED | OUTPATIENT
Start: 2024-01-12 | End: 2024-02-08 | Stop reason: ALTCHOICE

## 2024-01-12 RX ORDER — LETROZOLE 2.5 MG/1
2.5 TABLET, FILM COATED ORAL DAILY
Qty: 90 TABLET | Refills: 3 | Status: SHIPPED | OUTPATIENT
Start: 2024-01-12

## 2024-01-12 RX ORDER — PREDNISONE 20 MG/1
40 TABLET ORAL DAILY
Qty: 10 TABLET | Refills: 0 | Status: SHIPPED | OUTPATIENT
Start: 2024-01-12 | End: 2024-01-17

## 2024-01-12 RX ORDER — CYCLOBENZAPRINE HCL 10 MG
10 TABLET ORAL 3 TIMES DAILY PRN
Qty: 21 TABLET | Refills: 0 | Status: SHIPPED | OUTPATIENT
Start: 2024-01-12 | End: 2024-01-12 | Stop reason: SDUPTHER

## 2024-01-12 RX ORDER — PREDNISONE 20 MG/1
40 TABLET ORAL DAILY
Qty: 10 TABLET | Refills: 0 | Status: SHIPPED | OUTPATIENT
Start: 2024-01-12 | End: 2024-01-12 | Stop reason: SDUPTHER

## 2024-01-12 NOTE — TELEPHONE ENCOUNTER
The patient was seen at the ER last night and was advised to call Kay Lua to alert that her D-Dimer levels were 966. Wanted to make her aware, imaging was completed and no acute clot found however was advised that due to the D-Dimer result, they believe a clot to be present.   1710 - Spoke with Fouzia about the ultrasound of her liver. It shows most likely a hemangioma which is what we discussed in the office recently. I was not aware that she went to the ED last night. Her D-Dimer is high. I will refer her to benign hematology for further work up. She has been off of anastrozole for 3 weeks and feels a lot better. I will call in a Rx for letrozole 2.5 mg daily. Fouzia will call with any side effects.

## 2024-01-12 NOTE — ED TRIAGE NOTES
Pt arrives via triage with complaint of right arm pain. Also complaint of dizziness. Pt st onset at 1730 today while watching television. Pt st arm pain is 10/10. A/o x4, respirations regular and unlabored.

## 2024-01-12 NOTE — ED PROVIDER NOTES
HPI   Chief Complaint   Patient presents with    Dizziness    Arm Pain     Right       Patient presents with concern for dizziness and arm pain.    The patient presents with right arm pain that began this evening around 1730.  She denies any fall or injury.  States it is radiating from the shoulder to the hand.  She denies any preference for any fingers.  She denies any particular numbness with this.  No weakness with this.  She has full range of motion of her shoulder.  She does have a history of breast cancer and recently was taken off anastrozole.                     Wanda Coma Scale Score: 15                  Patient History   Past Medical History:   Diagnosis Date    Anxiety disorder, unspecified     Anxiety    Bilious vomiting 04/26/2016    Bilious vomiting with nausea    Breast CA (CMS/HCC)     Headache, unspecified 02/03/2017    Headache    Irritable bowel syndrome with diarrhea 04/07/2016    Irritable bowel syndrome with diarrhea    Mammographic calcification found on diagnostic imaging of breast 06/01/2015    Breast calcification, right    ARIAN (obstructive sleep apnea)     Overactive bladder     OAB (overactive bladder)    Personal history of diseases of the blood and blood-forming organs and certain disorders involving the immune mechanism     History of anemia    Personal history of diseases of the skin and subcutaneous tissue 07/27/2018    History of cyst of breast    Personal history of other diseases of the female genital tract 08/01/2017    History of breast pain    Personal history of other mental and behavioral disorders     History of depression    Personal history of other specified conditions     History of lump of left breast     Past Surgical History:   Procedure Laterality Date    BREAST LUMPECTOMY Right 08/2021    HERNIA REPAIR  10/17/2014    Hernia Repair    MR HEAD ANGIO WO IV CONTRAST  06/12/2021    MR HEAD ANGIO WO IV CONTRAST [facility]12/[facility]    MR NECK ANGIO WO IV  CONTRAST  06/12/2021    MR NECK ANGIO WO IV CONTRAST 6/12/2021 [facility]    OTHER SURGICAL HISTORY  10/17/2014    Laparoscopic Excision Of Ectopic Pregnancy And Salpingectomy    OTHER SURGICAL HISTORY  08/03/2021    Knee arthroscopy     Family History   Problem Relation Name Age of Onset    Other (murder) Mother      Alzheimer's disease Father      Diabetes Father      Lung cancer Father      Asthma Brother      Stomach cancer Maternal Grandmother      Breast cancer Paternal Grandmother      Other (cardiac disorder) Other      Stroke Other      Ovarian cancer Other      Breast cancer Other       Social History     Tobacco Use    Smoking status: Never    Smokeless tobacco: Never   Vaping Use    Vaping Use: Never used   Substance Use Topics    Alcohol use: Yes     Comment: occasional glass of wine    Drug use: Never       Physical Exam   ED Triage Vitals [01/11/24 2008]   Temp Heart Rate Resp BP   36.1 °C (97 °F) 87 18 (!) 172/106      SpO2 Temp src Heart Rate Source Patient Position   98 % -- -- --      BP Location FiO2 (%)     -- --       Physical Exam  Vitals and nursing note reviewed.   Constitutional:       General: She is not in acute distress.     Appearance: She is well-developed.   HENT:      Head: Normocephalic and atraumatic.      Mouth/Throat:      Mouth: Mucous membranes are moist.   Eyes:      Conjunctiva/sclera: Conjunctivae normal.   Cardiovascular:      Rate and Rhythm: Normal rate and regular rhythm.   Pulmonary:      Effort: Pulmonary effort is normal. No respiratory distress.   Abdominal:      General: There is no distension.      Palpations: Abdomen is soft.   Musculoskeletal:         General: No swelling.      Cervical back: Neck supple.   Skin:     General: Skin is warm and dry.   Neurological:      General: No focal deficit present.      Mental Status: She is alert.   Psychiatric:         Mood and Affect: Mood normal.         ED Course & MDM   Diagnoses as of 01/12/24 0333   Cervical  radiculopathy       Medical Decision Making  Patient still has pain with range of motion but it is much improved.  She is able to complete range of motion.  Do not think that there is any rotator cuff injury at this time.  Considered cervical neuropathy most likely.  Patient has an elevated D-dimer.  Given her history of cancer I did obtain a CT of the chest.  Unclear there is any mets to the brain.  She is not recent PET scan.  Will CT the head and neck as well.  This unremarkable for any acute surgical pathology.  Return precautions given for signs of central cord or cauda equina.  Do not feel the patient needs stat MRI criteria at this time.  Will follow-up as an outpatient.  Good given muscle relaxer and steroid.    EKG interpreted by myself.  Normal sinus rhythm at a rate of 85 bpm.  Normal intervals.  Normal axis.  No signs of acute ischemia.      Procedure  Procedures     Erwin Nickerson MD  01/12/24 0334       Erwin Nickerson MD  01/12/24 0330

## 2024-01-15 ENCOUNTER — TELEPHONE (OUTPATIENT)
Dept: HEMATOLOGY/ONCOLOGY | Facility: CLINIC | Age: 64
End: 2024-01-15

## 2024-01-15 ENCOUNTER — OFFICE VISIT (OUTPATIENT)
Dept: SLEEP MEDICINE | Facility: CLINIC | Age: 64
End: 2024-01-15
Payer: COMMERCIAL

## 2024-01-15 VITALS — BODY MASS INDEX: 48.35 KG/M2 | TEMPERATURE: 97 F | HEIGHT: 65 IN | WEIGHT: 290.2 LBS | HEART RATE: 105 BPM

## 2024-01-15 DIAGNOSIS — G47.33 OBSTRUCTIVE SLEEP APNEA, ADULT: Primary | ICD-10-CM

## 2024-01-15 LAB
NIL(NEG) CONTROL SPOT COUNT: NORMAL
PANEL A SPOT COUNT: 0
PANEL B SPOT COUNT: 0
POS CONTROL SPOT COUNT: NORMAL
T-SPOT. TB INTERPRETATION: NEGATIVE

## 2024-01-15 PROCEDURE — 3008F BODY MASS INDEX DOCD: CPT | Performed by: STUDENT IN AN ORGANIZED HEALTH CARE EDUCATION/TRAINING PROGRAM

## 2024-01-15 PROCEDURE — 99214 OFFICE O/P EST MOD 30 MIN: CPT | Performed by: STUDENT IN AN ORGANIZED HEALTH CARE EDUCATION/TRAINING PROGRAM

## 2024-01-15 PROCEDURE — 1036F TOBACCO NON-USER: CPT | Performed by: STUDENT IN AN ORGANIZED HEALTH CARE EDUCATION/TRAINING PROGRAM

## 2024-01-15 ASSESSMENT — SLEEP AND FATIGUE QUESTIONNAIRES
HOW LIKELY ARE YOU TO NOD OFF OR FALL ASLEEP WHILE SITTING AND TALKING TO SOMEONE: WOULD NEVER DOZE
SLEEP_PROBLEM_NOTICEABLE_TO_OTHERS: A LITTLE
SATISFACTION_WITH_CURRENT_SLEEP_PATTERN: DISSATISFIED
SLEEP_PROBLEM_INTERFERES_DAILY_ACTIVITIES: NOT AT ALL NOTICEABLE
HOW LIKELY ARE YOU TO NOD OFF OR FALL ASLEEP IN A CAR, WHILE STOPPED FOR A FEW MINUTES IN TRAFFIC: WOULD NEVER DOZE
HOW LIKELY ARE YOU TO NOD OFF OR FALL ASLEEP WHEN YOU ARE A PASSENGER IN A CAR FOR AN HOUR WITHOUT A BREAK: WOULD NEVER DOZE
ESS-CHAD TOTAL SCORE: 5
WORRIED_DISTRESSED_DUE_TO_SLEEP: NOT AT ALL NOTICEABLE
HOW LIKELY ARE YOU TO NOD OFF OR FALL ASLEEP WHILE LYING DOWN TO REST IN THE AFTERNOON WHEN CIRCUMSTANCES PERMIT: MODERATE CHANCE OF DOZING
HOW LIKELY ARE YOU TO NOD OFF OR FALL ASLEEP WHILE SITTING AND READING: WOULD NEVER DOZE
SITING INACTIVE IN A PUBLIC PLACE LIKE A CLASS ROOM OR A MOVIE THEATER: WOULD NEVER DOZE
DIFFICULTY_FALLING_ASLEEP: MODERATE
HOW LIKELY ARE YOU TO NOD OFF OR FALL ASLEEP WHILE SITTING QUIETLY AFTER LUNCH WITHOUT ALCOHOL: WOULD NEVER DOZE
HOW LIKELY ARE YOU TO NOD OFF OR FALL ASLEEP WHILE WATCHING TV: HIGH CHANCE OF DOZING

## 2024-01-15 ASSESSMENT — ENCOUNTER SYMPTOMS
CARDIOVASCULAR NEGATIVE: 1
NEUROLOGICAL NEGATIVE: 1
CONSTITUTIONAL NEGATIVE: 1
RESPIRATORY NEGATIVE: 1
PSYCHIATRIC NEGATIVE: 1

## 2024-01-15 NOTE — ASSESSMENT & PLAN NOTE
- doing well with PAP therapy  - continue current setting  - didn't quite like the current nasal pillow mask -> fitted with N30i today in clinic and she liked it  - renew PAP supply orders    
BMI Readings from Last 1 Encounters:   01/15/24 48.29 kg/m²     - encouraged healthy weight loss via diet and exercise  - consider referral to the weight loss program at follow-up visit    
Is This A New Presentation, Or A Follow-Up?: Follow Up Rosacea

## 2024-01-15 NOTE — TELEPHONE ENCOUNTER
Pt was seen in the ER on 1/11 for dizziness and pain to her right arm.  She states now she is feeling fine.  Pt had labs and imaging done.  They want her to see Heme-onc and she is not altogether sure why.  Her [id number] at the ER.  She has an appt with ELDON Mcdonald CNP on 2/8.  She is wondering if it is OK to have you follow up with her?  And if yes, should she make her appointment sooner than 2/8?  She is basically feeling OK today, a little dizzy still and had a headache today.

## 2024-01-15 NOTE — PROGRESS NOTES
" Patient: Sanchez Campbell    [id number]  : 1960 -- AGE 63 y.o.    Provider: Erwin Black MD     Location [facility]   Service Date: 1/15/2024              [facility]  Followup Visit Note    HISTORY OF PRESENT ILLNESS     HISTORY OF PRESENT ILLNESS   Sanchez Campbell \"Ashlyn" is a 63 y.o. female with h/o ARAIN and Obesity who presents to a [facility] for followup.     Assessment and plan from last visit: 2022 with Kinjal Mullins NP    Ms. SANCHEZ CAMPBELL is a 62 year female with the following problems:     OBSTRUCTIVE SLEEP APNEA- severe:  -Start 5-10 cmH20 APAP through [employer].   -Sleep apnea and PAP therapy education was provided at length in clinic today. SANCHEZ verbalized understanding.  -Diet, exercise, and weight loss were emphasized today in clinic, as were non-supine sleep, avoiding alcohol in the late evening, and driving or operating heavy machinery when sleepy.    -SANCHEZ verbalized understanding.     HYPERTENSION:  -Blood pressure was controlled today.  -Last Echo: 65%  -Follows with PCP/ Cardiology     OBESITY with a BMI of 46. Her most recent Bicarb on BMP was 29 in 22     PROBABLE RESTLESS LEG SYNDROME: This occurs frequently.  -Will monitor for now     Current History    On today's visit, the patient reports doing very well with PAP therapy.  No issues except that she found the nasal pillow mask not too comfortable.  Working on weight loss.  Found a lot of symptomatic with the PAP therapy.    PAP Info  DURABLE MEDICAL EQUIPMENT COMPANY: [employer]    RLS Followup:   none.    Daytime Symptoms    Patient reports DAYTIME SYMPTOMS: no daytime symptoms  Patient denies daytime symptoms including: Denies: excessive daytime sleepiness    Naps: No  Fatigue: denies feeling fatigue    ESS: 5  REYNA: 6  FOSQ: 40    REVIEW OF SYSTEMS     REVIEW OF SYSTEMS  Review of " Systems   Constitutional: Negative.    HENT: Negative.     Respiratory: Negative.     Cardiovascular: Negative.    Genitourinary: Negative.    Skin: Negative.    Neurological: Negative.    Psychiatric/Behavioral: Negative.           ALLERGIES AND MEDICATIONS     ALLERGIES  Allergies   Allergen Reactions    Adhesive Tape-Silicones Itching    Latex Hives    Meperidine Other     GI upset    Morphine Other     GI upset       MEDICATIONS: She has a current medication list which includes the following prescription(s): anastrozole - Take 1 tablet (1 mg total) by mouth once daily, cyclobenzaprine - Take 1 tablet (10 mg) by mouth 3 times a day as needed for muscle spasms for up to 7 days, diclofenac sodium - Apply 1 Application topically 4 times a day, furosemide - Take 1 tablet (20 mg) by mouth once daily as needed, letrozole - Take 1 tablet (2.5 mg total) by mouth once daily.  Take with or without food, prednisone - Take 2 tablets (40 mg) by mouth once daily for 5 days, and venlafaxine - Take 1 tablet (37.5 mg) by mouth once daily.    PAST MEDICAL HISTORY : She  has a past medical history of Anxiety disorder, unspecified, Bilious vomiting (04/26/2016), Breast CA (CMS/[facility]), Headache, unspecified (02/03/2017), Irritable bowel syndrome with diarrhea (04/07/2016), Mammographic calcification found on diagnostic imaging of breast (06/01/2015), ARIAN (obstructive sleep apnea), Overactive bladder, Personal history of diseases of the blood and blood-forming organs and certain disorders involving the immune mechanism, Personal history of diseases of the skin and subcutaneous tissue (07/27/2018), Personal history of other diseases of the female genital tract (08/01/2017), Personal history of other mental and behavioral disorders, and Personal history of other specified conditions.    PAST SURGICAL HISTORY: She  has a past surgical history that includes Other surgical history (10/17/2014); Hernia repair (10/17/2014); Other surgical  "history (08/03/2021); MR angio head wo IV contrast (06/12/2021); MR angio neck wo IV contrast (06/12/2021); and Breast lumpectomy (Right, 08/2021).     FAMILY HISTORY: No changes since previous visit. Otherwise non-contributory as charted.     SOCIAL HISTORY  She  reports that she has never smoked. She has never used smokeless tobacco. She reports current alcohol use. She reports that she does not use drugs.       PHYSICAL EXAM     VITAL SIGNS: Pulse 105   Temp 36.1 °C (97 °F) (Temporal)   Ht 1.651 m (5' 5\")   Wt 132 kg (290 lb 3.2 oz)   BMI 48.29 kg/m²      PREVIOUS WEIGHTS:  Wt Readings from Last 3 Encounters:   01/15/24 132 kg (290 lb 3.2 oz)   01/12/24 133 kg (293 lb)   01/11/24 132 kg (292 lb)         RESULTS/DATA     Bicarbonate (mmol/L)   Date Value   01/11/2024 27   10/26/2023 29   09/12/2023 27   09/26/2022 29   08/01/2022 27     Iron (ug/dL)   Date Value   11/03/2023 38   10/26/2023 29 (L)   10/02/2023 27 (L)     % Saturation (%)   Date Value   11/03/2023 13 (L)   10/26/2023 10 (L)   10/02/2023 8 (L)     TIBC (ug/dL)   Date Value   11/03/2023 300   10/26/2023 297   10/02/2023 319     Ferritin (ng/mL)   Date Value   11/03/2023 142   10/26/2023 108   10/02/2023 121       PAP Adherence  A PAP adherence download was obtained and data was reviewed personally today in clinic.    ASSESSMENT/PLAN     Ms. Monroe is a 63 y.o. female and she returns in followup to the [facility] for ARIAN.    Problem List, Orders, Assessment, Recommendations:  Problem List Items Addressed This Visit             ICD-10-CM    Obstructive sleep apnea, adult - Primary G47.33     - doing well with PAP therapy  - continue current setting  - didn't quite like the current nasal pillow mask -> fitted with N30i today in clinic and she liked it  - renew PAP supply orders           Relevant Orders    Positive Airway Pressure (PAP) Therapy    BMI 45.0-49.9, adult (CMS/[facility]) Z68.42     BMI Readings from Last " 1 Encounters:   01/15/24 48.29 kg/m²   - encouraged healthy weight loss via diet and exercise  - consider referral to the weight loss program at follow-up visit              Disposition    Return to clinic in 12 months

## 2024-01-17 ENCOUNTER — TELEPHONE (OUTPATIENT)
Dept: HEMATOLOGY/ONCOLOGY | Facility: CLINIC | Age: 64
End: 2024-01-17
Payer: COMMERCIAL

## 2024-01-17 NOTE — TELEPHONE ENCOUNTER
Late Entry - spoke with Fouzia. She feels much better off of anastrozole. I will switch her to letrozole 2.5mg daily. I asked Fouzia to reach out in a few weeks to let me know how things are going, or, sooner if she has any issues with letrozole. She was seen in the ED for pain in her arm. Results show abnormality in the liver. I will place an order for an ultrasound as was recommended. I will call with those results. Dorothy

## 2024-01-18 ENCOUNTER — OFFICE VISIT (OUTPATIENT)
Dept: HEMATOLOGY/ONCOLOGY | Facility: CLINIC | Age: 64
End: 2024-01-18
Payer: COMMERCIAL

## 2024-01-18 VITALS
HEART RATE: 83 BPM | OXYGEN SATURATION: 96 % | BODY MASS INDEX: 48.9 KG/M2 | WEIGHT: 293 LBS | SYSTOLIC BLOOD PRESSURE: 137 MMHG | DIASTOLIC BLOOD PRESSURE: 81 MMHG | TEMPERATURE: 96.8 F | RESPIRATION RATE: 18 BRPM

## 2024-01-18 DIAGNOSIS — R79.89 D-DIMER, ELEVATED: ICD-10-CM

## 2024-01-18 DIAGNOSIS — N23 RENAL PAIN: ICD-10-CM

## 2024-01-18 DIAGNOSIS — C50.111 CANCER OF CENTRAL PORTION OF RIGHT BREAST (MULTI): Primary | ICD-10-CM

## 2024-01-18 LAB
APPEARANCE UR: ABNORMAL
BACTERIA #/AREA URNS AUTO: ABNORMAL /HPF
BILIRUB UR STRIP.AUTO-MCNC: NEGATIVE MG/DL
COLOR UR: YELLOW
GLUCOSE UR STRIP.AUTO-MCNC: NEGATIVE MG/DL
KETONES UR STRIP.AUTO-MCNC: NEGATIVE MG/DL
LEUKOCYTE ESTERASE UR QL STRIP.AUTO: NEGATIVE
MUCOUS THREADS #/AREA URNS AUTO: ABNORMAL /LPF
NITRITE UR QL STRIP.AUTO: NEGATIVE
PH UR STRIP.AUTO: 5 [PH]
PROT UR STRIP.AUTO-MCNC: NEGATIVE MG/DL
RBC # UR STRIP.AUTO: ABNORMAL /UL
RBC #/AREA URNS AUTO: ABNORMAL /HPF
SP GR UR STRIP.AUTO: 1.02
SQUAMOUS #/AREA URNS AUTO: ABNORMAL /HPF
UROBILINOGEN UR STRIP.AUTO-MCNC: <2 MG/DL
WBC #/AREA URNS AUTO: ABNORMAL /HPF

## 2024-01-18 PROCEDURE — 99214 OFFICE O/P EST MOD 30 MIN: CPT

## 2024-01-18 PROCEDURE — 87086 URINE CULTURE/COLONY COUNT: CPT

## 2024-01-18 PROCEDURE — 1036F TOBACCO NON-USER: CPT

## 2024-01-18 PROCEDURE — 81001 URINALYSIS AUTO W/SCOPE: CPT

## 2024-01-18 PROCEDURE — 3008F BODY MASS INDEX DOCD: CPT

## 2024-01-18 ASSESSMENT — ENCOUNTER SYMPTOMS
LOSS OF SENSATION IN FEET: 0
DEPRESSION: 0
OCCASIONAL FEELINGS OF UNSTEADINESS: 0

## 2024-01-18 ASSESSMENT — PAIN SCALES - GENERAL: PAINLEVEL: 7

## 2024-01-18 NOTE — PROGRESS NOTES
"Patient Visit Information:   Visit Type: Follow up Visit      Cancer History:   Treatment Synopsis:       Breast         AJCC Edition: 8th (AJCC), Diagnosis Date: 27-Jul-2021, IA, pT1b pN0 cM0 G2     Treatment Synopsis:    61-year-old postmenopausal AA female with rightt-sided invasive lobular carcinoma, stage IA (T1b N0 M0). The patient's breast cancer was diagnosed on July 27, 2021,  and is estrogen receptor positive at >95%, progesterone receptor positive at >95%, and HER-2/xiao negative, grade 2 with MammaPrint Index = +0.120; translating to Low Risk Luminal A with a 10-year risk of recurrence of 10%. Details of her history are as  follows:       07/21/2021: Patient underwent a bilateral MRI for screening due to dense breast. This showed a mass measuring 0.8 x 0.8 x 0.7 cm mass. No axillary  or internal mammary LN appreciated   07/27/2021: Second look targeted US of the right breast and axilla-revealed 5 normal looking LNs.    07/27/2021: Patient underwent a US-guided biopsy of the right breast at 12:00, 4 cm from the nipple. Pathology was consistent with results as above   08/19/2021: Patient underwent a right partial mastectomy with SLNB. Pathology showed a 0.6cminvasive carcinoma, grade 2, with 0/2 SLNs involved. Margins were negative   2/23/2022: Completed Radiation   03/03/2022: Started Arimidex     History of Present Illness:      ID Statement:       Calvin Monroe is a 63 year old female       Chief Complaint: \"I was in the ER and had an elevated D-dimer\"   Interval History:    Patient is a pleasant 63-year-old female presents today for elevated D-dimer following emergency room visit.  CT angio chest was performed and did not see any signs of pulmonary embolism.  D-dimer elevated 966, CRP elevated.  Conducted a flu and COVID swab which was negative.  She followed up with cardiology 1/12/2023 and was cleared and instructed to only follow-up as needed.  They performed a CT spine which revealed " "degenerative joint disease and foraminal stenosis at C5-7.  No abnormal intracranial abnormality.  CT of head performed with no acute concerns or hemorrhage.    She reports still experiencing headaches though most of her other symptoms have resolved.  She feels somewhat tired but not as bad after she was taken off of anastrozole by her rheumatologist.  Received iron infusions.  Reports normal appetite no weight loss.  Reports night sweats occasionally that have decreased and being followed by Dr. Burris clinic.  She questions if she should fill her letrozole that was sent.  Advised to take it as it is under 5 years and to reach out to her breast cancer oncologist.    She notes improvement in her joint pain since being taken off of anastrozole.  However, she has back pain on the left lower over her kidney.  She reports this is 8 out of 10 and has been persistent.  She says, \"everyone keeps taking ultrasounds of my front but never my back.  \"    Diagnosis of hormone positive breast cancer diagnosed July 21, 2021.  Treatment synopsis as above. Bone survey did not show lytic lesions. She had a recent EGD and colonoscopy 10/10/2023 due to abdominal- right upper quadrant.  Biopsies for celiac disease pending.  Internal hemorrhoids identified no other abnormal findings.  On 09/26/2023 CT abdomen pelvis shows hypodense liver lesion.  The stability since 2020 indicating benign etiology.  Ultrasound of gallbladder same date shows diffusely increased hepatic echogenicity may be seen with hepatic steatosis or hepatocellular disease, likely contributing to anemia. X-ray completed October 19, 2023 that showed no acute cardiopulmonary process.  She reports they were ruling out pneumonia.  Bilateral mammogram pending, previous 12/2022 recommended 1 year screening no malignancy identified.  She does report a recent urinary tract infection that has resolved.  Patient denies cough, visual or hearing changes, oral sores or lesions of " the skin, shortness of breath, problem swallowing, pain in the chest, current urinary issues, diarrhea, constipation, unusual vaginal discharge or dryness, any new lymphadenopathy or changes in her chest/breast area.  She reports her breast cancer was discovered during a routine mammogram screening.     Medical history:  -Edema in lower extremities  -Irritable bowel syndrome  -Multiple sclerosis  -Right side breast cancer hormone positive on anastrozole  -Perimenopausal  -Hematuria  -Partial meniscectomy  -Umbilical hernia repair     Social history:  -She lives with  and has 3 healthy children and multiple grandchildren  -Homemaker  -She never smoked  -She does not drink other than wine occasionally marijuana Gummies for pain as needed recently  -No illicit drug use     Family history:  -Maternal grandmother stomach and liver cancer  -Paternal grandmother: Breast cancer  -Paternal uncle colon cancer  -No other known hematologic, genetic or cancer diagnosis in first-degree relatives     Review of Systems:   A review of systems has been completed and are negative for complaints except what is stated in the assessment, HPI, IH, and/or past medical history.           Allergies and Intolerances:       Allergies:             Allergies   Allergen Reactions    Adhesive Tape-Silicones Itching    Latex Hives    Meperidine Other       GI upset    Morphine Other       GI upset         Outpatient Medication Profile:             Current Outpatient Medications   Medication Instructions    anastrozole (Arimidex) 1 mg tablet 1 tablet, oral, Daily    furosemide (Lasix) 20 mg tablet 1 tablet, oral, Daily PRN    hyoscyamine (ANASPAZ, LEVSIN) 0.125 mg, oral, Every 6 hours PRN    venlafaxine (Effexor) 37.5 mg tablet 1 tablet, oral, Daily         Performance:   ECOG Performance Status: 0         Vitals and Measurements:   Visit Vitals  /81 (BP Location: Left arm, Patient Position: Sitting, BP Cuff Size: Large adult)   Pulse  83   Temp 36 °C (96.8 °F) (Temporal)   Resp 18      Vitals:    01/18/24 1002   Weight: 133 kg (293 lb 14 oz)        Physical Exam:      Constitutional: Patient is awake/alert/oriented  x3, no distress, Nourished, hydrated, alert and cooperative   Eyes: PERRL, EOMI, clear sclera   ENMT: mucous membranes moist, no oral lesions    Head/Neck: Neck supple, no apparent injury, thyroid  without mass or tenderness, No JVD, trachea midline, no bruits   Respiratory/Thorax: Patent airways, CTAB, chest symmetry, normal inspiratory and expiratory effort    Cardiovascular: Regular, rate and rhythm, normal S1 and S2, no carotid bruit   Gastrointestinal: Non-distended, soft, right upper quad tenderness with palpation, no masses or organomegaly appreciated   Genitourinary: deferred   Musculoskeletal: Pain in lumbar spine with palpation, lower right hip discomfort with palpation, normal strength for baseline    Extremities: Mild nonpitting ankle edema bilateral, normal strength, good circulation pulses   Neurological: alert and oriented x3, intact senses,  motor, response and reflexes, normal strength, cranial nerves normal   Breast: deferred   Lymphatic: No significant lymphadenopathy   Psychological: Appropriate mood and behavior   Skin: dry, no lesions, no rashes         Lab Results:    Lab Results   Component Value Date    WBC 6.7 01/11/2024    NEUTROABS 3.30 01/11/2024    IGABSOL 0.01 01/11/2024    LYMPHSABS 2.57 01/11/2024    MONOSABS 0.57 01/11/2024    EOSABS 0.20 01/11/2024    BASOSABS 0.04 01/11/2024    RBC 4.67 01/11/2024    MCV 84 01/11/2024    MCHC 30.8 (L) 01/11/2024    HGB 12.0 01/11/2024    HCT 39.0 01/11/2024     01/11/2024     Lab Results   Component Value Date    RETICCTPCT 1.4 10/26/2023      Lab Results   Component Value Date    CREATININE 0.69 01/11/2024    BUN 16 01/11/2024    EGFR >90 01/11/2024     01/11/2024    K 3.9 01/11/2024     01/11/2024    CO2 27 01/11/2024      Lab Results    Component Value Date    ALT 13 01/11/2024    AST 14 01/11/2024    ALKPHOS 76 01/11/2024    BILITOT 0.3 01/11/2024      Lab Results   Component Value Date    TSH 0.48 08/01/2022     Lab Results   Component Value Date    TSH 0.48 08/01/2022     Lab Results   Component Value Date    IRON 38 11/03/2023    TIBC 300 11/03/2023    FERRITIN 142 11/03/2023      Lab Results   Component Value Date    WARD Negative 10/26/2023    RF <10 12/20/2023    SEDRATE 76 (H) 10/26/2023      Lab Results   Component Value Date    CRP 3.54 (H) 01/12/2024      Lab Results   Component Value Date     10/26/2023     Lab Results   Component Value Date    HAPTOGLOBIN 236 10/26/2023     Lab Results   Component Value Date    SPEP Normal. 10/26/2023     Lab Results   Component Value Date    IGG 1,450 10/26/2023     10/26/2023     (H) 10/26/2023              Assessment and Plan:   #1.  Elevated D-dimer none DVT  Ms. Avery Huitron is a pleasant 63-year-old female that presents for follow-up from being in the emergency room with an elevated D-dimer.  She had previously seen for iron deficiency and was infused x 2 Feraheme in November 2023. Tolerated well and with no side effects.  We will repeat these labs in 2 weeks as well as a D-dimer.  I have scheduled more iron for 3 months as needed with clinic appointment.  She has no symptoms today and actually improves overall feeling better.  #2.  Iron deficiency anemia  At initial visit hemoglobin 10.8, ferritin 108, iron TSAT 10%, ESR and CRP elevated.  MCV 81, previously microcytic.  GFR above 90.  Retic percent calculated 1.4.  Infusion received 10/31 and 11/07.  Discussed the potential causes of iron deficiency anemia.  Her inflammatory markers are elevated.  Full anemia work-up is negative for concerning markers. Her SPEP is normal, kappa light chains elevated likely inflammatory, immunoglobulins IgA slightly elevated at 409, WARD negative.   Due to patient's complaint of bone pain  in lower back, skeletal survey ordered and negative for lytic or concerning myeloma lesions. Notable degenerative changes. Referral to Rheumatology for elevated ESR, CRP and musculoskeletal pain. We will continue to monitor immunoglobulins and inflammatory markers along with anemia labs.    #3.  Right lower back pain radiating to front at times  Previous ultrasound of abdomen revealed lesion on liver likely hemangioma.  She does mention that this pain radiates around her back to her front.  Therefore, ultrasound of kidneys has been ordered.  MRI of liver.  Possible causes, urinary tract infection versus kidney stone versus musculoskeletal discomfort.  Ordered urinalysis with reflex to microscopic.     follow-up:     RTC:  -- 2 weeks labs following final IV iron to evaluate iron stores replenishment  -- 3 months from initial visit, labs, with concurrent IV iron infusion scheduled as needed    Labs:  -Urinalysis sent due to back pain  -Anemia labs are drawn along with repeat D-dimer    Medications:  -- Feraheme IV x2 doses 1 week apart as need in 3 months (scheduled)     Imaging:  -- MRI of liver  --Ultrasound of kidneys     Referrals:  -- Primary care as scheduled 3/15/2024  --Dermatology 1/24/2024  --Rheumatology 4/10/2024       Patient Instructions summary:  Discussed evaluation of symptoms.  Previous ultrasound of abdomen revealed lesion on liver likely hemangioma.  She does mention that this pain radiates around her back to her front.  Therefore, ultrasound of kidneys has been ordered.  Urinary tract infection versus kidney stone versus musculoskeletal discomfort.  Ordered urinalysis with reflex to microscopic.  Awaiting results.  She states agreement and understanding to plan.  She will call with any questions or concerns.     Thank you for allowing me to care for you today.     Sincerely,  Aarti Mcdonald, APRN-CNP

## 2024-01-22 LAB — BACTERIA UR CULT: NORMAL

## 2024-01-24 ENCOUNTER — OFFICE VISIT (OUTPATIENT)
Dept: DERMATOLOGY | Facility: CLINIC | Age: 64
End: 2024-01-24
Payer: COMMERCIAL

## 2024-01-24 ENCOUNTER — LAB (OUTPATIENT)
Dept: LAB | Facility: LAB | Age: 64
End: 2024-01-24
Payer: COMMERCIAL

## 2024-01-24 DIAGNOSIS — L91.0 KELOID: ICD-10-CM

## 2024-01-24 DIAGNOSIS — C50.919 MALIGNANT NEOPLASM OF FEMALE BREAST, UNSPECIFIED ESTROGEN RECEPTOR STATUS, UNSPECIFIED LATERALITY, UNSPECIFIED SITE OF BREAST (MULTI): ICD-10-CM

## 2024-01-24 DIAGNOSIS — D48.5 NEOPLASM OF UNCERTAIN BEHAVIOR OF SKIN: ICD-10-CM

## 2024-01-24 DIAGNOSIS — Z12.83 SKIN CANCER SCREENING: Primary | ICD-10-CM

## 2024-01-24 DIAGNOSIS — D22.9 NEVUS: ICD-10-CM

## 2024-01-24 DIAGNOSIS — L81.4 LENTIGO: ICD-10-CM

## 2024-01-24 LAB
ALBUMIN SERPL BCP-MCNC: 3.9 G/DL (ref 3.4–5)
ALP SERPL-CCNC: 94 U/L (ref 33–136)
ALT SERPL W P-5'-P-CCNC: 30 U/L (ref 7–45)
ANION GAP SERPL CALC-SCNC: 15 MMOL/L (ref 10–20)
APTT PPP: 33 SECONDS (ref 27–38)
AST SERPL W P-5'-P-CCNC: 22 U/L (ref 9–39)
BASOPHILS # BLD AUTO: 0.03 X10*3/UL (ref 0–0.1)
BASOPHILS NFR BLD AUTO: 0.4 %
BILIRUB SERPL-MCNC: 0.4 MG/DL (ref 0–1.2)
BUN SERPL-MCNC: 14 MG/DL (ref 6–23)
CALCIUM SERPL-MCNC: 9.2 MG/DL (ref 8.6–10.3)
CHLORIDE SERPL-SCNC: 101 MMOL/L (ref 98–107)
CO2 SERPL-SCNC: 26 MMOL/L (ref 21–32)
CREAT SERPL-MCNC: 0.67 MG/DL (ref 0.5–1.05)
D DIMER PPP FEU-MCNC: 797 NG/ML FEU
EGFRCR SERPLBLD CKD-EPI 2021: >90 ML/MIN/1.73M*2
EOSINOPHIL # BLD AUTO: 0.18 X10*3/UL (ref 0–0.7)
EOSINOPHIL NFR BLD AUTO: 2.6 %
ERYTHROCYTE [DISTWIDTH] IN BLOOD BY AUTOMATED COUNT: 16.1 % (ref 11.5–14.5)
FERRITIN SERPL-MCNC: 523 NG/ML (ref 8–150)
GLUCOSE SERPL-MCNC: 105 MG/DL (ref 74–99)
HCT VFR BLD AUTO: 36.8 % (ref 36–46)
HGB BLD-MCNC: 11.7 G/DL (ref 12–16)
HGB RETIC QN: 30 PG (ref 28–38)
IMM GRANULOCYTES # BLD AUTO: 0.02 X10*3/UL (ref 0–0.7)
IMM GRANULOCYTES NFR BLD AUTO: 0.3 % (ref 0–0.9)
IMMATURE RETIC FRACTION: 12.8 %
INR PPP: 1 (ref 0.9–1.1)
IRON SATN MFR SERPL: 28 % (ref 25–45)
IRON SERPL-MCNC: 79 UG/DL (ref 35–150)
LDH SERPL L TO P-CCNC: 157 U/L (ref 84–246)
LYMPHOCYTES # BLD AUTO: 2.66 X10*3/UL (ref 1.2–4.8)
LYMPHOCYTES NFR BLD AUTO: 39.1 %
MCH RBC QN AUTO: 26.2 PG (ref 26–34)
MCHC RBC AUTO-ENTMCNC: 31.8 G/DL (ref 32–36)
MCV RBC AUTO: 82 FL (ref 80–100)
MONOCYTES # BLD AUTO: 0.58 X10*3/UL (ref 0.1–1)
MONOCYTES NFR BLD AUTO: 8.5 %
NEUTROPHILS # BLD AUTO: 3.33 X10*3/UL (ref 1.2–7.7)
NEUTROPHILS NFR BLD AUTO: 49.1 %
NRBC BLD-RTO: 0 /100 WBCS (ref 0–0)
PLATELET # BLD AUTO: 280 X10*3/UL (ref 150–450)
POTASSIUM SERPL-SCNC: 4.1 MMOL/L (ref 3.5–5.3)
PROT SERPL-MCNC: 7.2 G/DL (ref 6.4–8.2)
PROTHROMBIN TIME: 11.6 SECONDS (ref 9.8–12.8)
RBC # BLD AUTO: 4.47 X10*6/UL (ref 4–5.2)
RETICS #: 0.06 X10*6/UL (ref 0.02–0.08)
RETICS/RBC NFR AUTO: 1.4 % (ref 0.5–2)
SODIUM SERPL-SCNC: 138 MMOL/L (ref 136–145)
TIBC SERPL-MCNC: 278 UG/DL (ref 240–445)
UIBC SERPL-MCNC: 199 UG/DL (ref 110–370)
URATE SERPL-MCNC: 5.3 MG/DL (ref 2.3–6.7)
WBC # BLD AUTO: 6.8 X10*3/UL (ref 4.4–11.3)

## 2024-01-24 PROCEDURE — 82728 ASSAY OF FERRITIN: CPT

## 2024-01-24 PROCEDURE — 85730 THROMBOPLASTIN TIME PARTIAL: CPT

## 2024-01-24 PROCEDURE — 3008F BODY MASS INDEX DOCD: CPT | Performed by: NURSE PRACTITIONER

## 2024-01-24 PROCEDURE — 82668 ASSAY OF ERYTHROPOIETIN: CPT

## 2024-01-24 PROCEDURE — 99213 OFFICE O/P EST LOW 20 MIN: CPT | Performed by: NURSE PRACTITIONER

## 2024-01-24 PROCEDURE — 83550 IRON BINDING TEST: CPT

## 2024-01-24 PROCEDURE — 83615 LACTATE (LD) (LDH) ENZYME: CPT

## 2024-01-24 PROCEDURE — 85025 COMPLETE CBC W/AUTO DIFF WBC: CPT

## 2024-01-24 PROCEDURE — 83010 ASSAY OF HAPTOGLOBIN QUANT: CPT

## 2024-01-24 PROCEDURE — 80053 COMPREHEN METABOLIC PANEL: CPT

## 2024-01-24 PROCEDURE — 1036F TOBACCO NON-USER: CPT | Performed by: NURSE PRACTITIONER

## 2024-01-24 PROCEDURE — 83540 ASSAY OF IRON: CPT

## 2024-01-24 PROCEDURE — 85379 FIBRIN DEGRADATION QUANT: CPT

## 2024-01-24 PROCEDURE — 36415 COLL VENOUS BLD VENIPUNCTURE: CPT

## 2024-01-24 PROCEDURE — 88305 TISSUE EXAM BY PATHOLOGIST: CPT | Performed by: DERMATOLOGY

## 2024-01-24 PROCEDURE — 85610 PROTHROMBIN TIME: CPT

## 2024-01-24 PROCEDURE — 85045 AUTOMATED RETICULOCYTE COUNT: CPT

## 2024-01-24 PROCEDURE — 84550 ASSAY OF BLOOD/URIC ACID: CPT

## 2024-01-24 NOTE — PROGRESS NOTES
Subjective     Fouzia Monroe is a 63 y.o. female who presents for the following: Skin Check.     Established patient in for annual full body skin exam.     Review of Systems:  No other skin or systemic complaints other than what is documented elsewhere in the note.    The following portions of the chart were reviewed this encounter and updated as appropriate:       Skin Cancer History  No skin cancer on file.    Specialty Problems          Dermatology Problems    Hemangioma of skin and subcutaneous tissue    Melanocytic nevi of trunk    Neoplasm of uncertain behavior of skin    Other melanin hyperpigmentation    Other seborrheic keratosis    Sebaceous cyst     Past Medical History:  Fouzia Monroe  has a past medical history of Anxiety disorder, unspecified, Bilious vomiting (04/26/2016), Breast CA (CMS/[facility]), Headache, unspecified (02/03/2017), Irritable bowel syndrome with diarrhea (04/07/2016), Mammographic calcification found on diagnostic imaging of breast (06/01/2015), ARIAN (obstructive sleep apnea), Overactive bladder, Personal history of diseases of the blood and blood-forming organs and certain disorders involving the immune mechanism, Personal history of diseases of the skin and subcutaneous tissue (07/27/2018), Personal history of other diseases of the female genital tract (08/01/2017), Personal history of other mental and behavioral disorders, and Personal history of other specified conditions.    Past Surgical History:  Fouzia Monroe  has a past surgical history that includes Other surgical history (10/17/2014); Hernia repair (10/17/2014); Other surgical history (08/03/2021); MR angio head wo IV contrast (06/12/2021); MR angio neck wo IV contrast (06/12/2021); and Breast lumpectomy (Right, 08/2021).    Family History:  Patient family history includes Alzheimer's disease in her father; Asthma in her brother; Breast cancer in her paternal grandmother and another family  member; Diabetes in her father; Lung cancer in her father; Ovarian cancer in an other family member; Stomach cancer in her maternal grandmother; Stroke in an other family member; cardiac disorder in an other family member; murder in her mother.    Social History:  Fouzia Monroe  reports that she has never smoked. She has never used smokeless tobacco. She reports current alcohol use. She reports that she does not use drugs.    Allergies:  Adhesive tape-silicones, Latex, Meperidine, and Morphine    Current Medications / CAM's:    Current Outpatient Medications:     anastrozole (Arimidex) 1 mg tablet, Take 1 tablet (1 mg total) by mouth once daily., Disp: , Rfl:     cyclobenzaprine (Flexeril) 10 mg tablet, Take 1 tablet (10 mg) by mouth 3 times a day as needed for muscle spasms for up to 7 days., Disp: 21 tablet, Rfl: 0    diclofenac sodium (Voltaren) 1 % gel gel, Apply 1 Application topically 4 times a day., Disp: 100 g, Rfl: 1    furosemide (Lasix) 20 mg tablet, Take 1 tablet (20 mg) by mouth once daily as needed., Disp: , Rfl:     letrozole (Femara) 2.5 mg tablet, Take 1 tablet (2.5 mg total) by mouth once daily.  Take with or without food., Disp: 90 tablet, Rfl: 3    venlafaxine (Effexor) 37.5 mg tablet, Take 1 tablet (37.5 mg) by mouth once daily., Disp: 90 tablet, Rfl: 1     Objective   Well appearing patient in no apparent distress; mood and affect are within normal limits.    A full examination was performed including scalp, head, eyes, ears, nose, lips, neck, chest, axillae, abdomen, back, buttocks, bilateral upper extremities, bilateral lower extremities, hands, feet, fingers, toes, fingernails, and toenails. All findings within normal limits unless otherwise noted below.    Assessment/Plan   1. Skin cancer screening        2. Neoplasm of uncertain behavior of skin  Left Breast              Lesion biopsy  Type of biopsy: tangential    Informed consent: discussed and consent obtained    Timeout:  patient name, date of birth, surgical site, and procedure verified    Procedure prep:  Patient was prepped and draped  Anesthesia: the lesion was anesthetized in a standard fashion    Anesthetic:  1% lidocaine w/ epinephrine 1-100,000 local infiltration  Instrument used: DermaBlade    Hemostasis achieved with: aluminum chloride    Outcome: patient tolerated procedure well    Post-procedure details: sterile dressing applied and wound care instructions given    Dressing type: petrolatum and bandage      Staff Communication: Dermatology Local Anesthesia: 1 % Lidocaine / Epinephrine - Amount: 1 ml    Specimen 1 - Dermatopathology- DERM LAB  Differential Diagnosis: Lentigo vs other  Check Margins Yes/No?:    Comments:    Dermpath Lab: Routine Histopathology (formalin-fixed tissue)    - Discussed differential with patient in clinic today.   - Given uncertainty in clinical diagnosis, biopsy is recommended in clinic today.  - The patient expressed understanding, is in agreement with this plan, and wishes to proceed with biopsy.  - Oral and written wound care instructions provided.  - Advised patient that the office will call within 2 weeks to discuss biopsy results.      3. Nevus  Multiple benign appearing flesh colored to pigmented macules and papules     Plan: Counseling.  I counseled the patient regarding the following:  Instructions: Monthly self-skin checks to monitor for any changes in moles are recommended. Expectations: Benign Nevi are pigmented nests of cells within the skin.No treatment is necessary. Contact Office if: Any moles change in size, shape or color; itch, bum or bleed.    4. Lentigo  Scattered tan macules in sun-exposed areas.    Solar lentigo (a type of lentigo also known as a senile lentigo, age spot, or liver spot) is a benign pigmented macule appearing on fair-skinned individuals that is related to ultraviolet radiation (UVR) exposure, typically from the sun.     PLAN:  Limiting sun exposure through  avoidance, protective clothing, and use of sunscreens can help prevent the appearance of solar lentigines.    If lesion changes or becomes symptomatic she should return to clinic    5. Keloid  Right Upper Back    ILK tolerated today  0.1ml of kenalog 40mg/ml

## 2024-01-25 LAB — HAPTOGLOB SERPL-MCNC: 256 MG/DL (ref 37–246)

## 2024-01-26 LAB — EPO SERPL-ACNC: 12 MU/ML (ref 4–27)

## 2024-01-30 ENCOUNTER — APPOINTMENT (OUTPATIENT)
Dept: PRIMARY CARE | Facility: CLINIC | Age: 64
End: 2024-01-30
Payer: COMMERCIAL

## 2024-01-31 ENCOUNTER — TELEPHONE (OUTPATIENT)
Dept: RHEUMATOLOGY | Facility: CLINIC | Age: 64
End: 2024-01-31

## 2024-01-31 DIAGNOSIS — Z79.899 HIGH RISK MEDICATION USE: Primary | ICD-10-CM

## 2024-02-01 ENCOUNTER — LAB (OUTPATIENT)
Dept: LAB | Facility: LAB | Age: 64
End: 2024-02-01
Payer: COMMERCIAL

## 2024-02-01 DIAGNOSIS — D50.9 IRON DEFICIENCY ANEMIA, UNSPECIFIED IRON DEFICIENCY ANEMIA TYPE: ICD-10-CM

## 2024-02-01 DIAGNOSIS — Z79.899 HIGH RISK MEDICATION USE: ICD-10-CM

## 2024-02-01 DIAGNOSIS — D50.8 OTHER IRON DEFICIENCY ANEMIA: ICD-10-CM

## 2024-02-01 LAB
ERYTHROCYTE [DISTWIDTH] IN BLOOD BY AUTOMATED COUNT: 15.9 % (ref 11.5–14.5)
HCT VFR BLD AUTO: 38.2 % (ref 36–46)
HGB BLD-MCNC: 11.6 G/DL (ref 12–16)
HGB RETIC QN: 28 PG (ref 28–38)
IMMATURE RETIC FRACTION: 12.2 %
MCH RBC QN AUTO: 25.7 PG (ref 26–34)
MCHC RBC AUTO-ENTMCNC: 30.4 G/DL (ref 32–36)
MCV RBC AUTO: 85 FL (ref 80–100)
NRBC BLD-RTO: 0 /100 WBCS (ref 0–0)
PLATELET # BLD AUTO: 294 X10*3/UL (ref 150–450)
RBC # BLD AUTO: 4.52 X10*6/UL (ref 4–5.2)
RETICS #: 0.07 X10*6/UL (ref 0.02–0.08)
RETICS/RBC NFR AUTO: 1.7 % (ref 0.5–2)
WBC # BLD AUTO: 7.8 X10*3/UL (ref 4.4–11.3)

## 2024-02-01 PROCEDURE — 36415 COLL VENOUS BLD VENIPUNCTURE: CPT

## 2024-02-01 PROCEDURE — 85027 COMPLETE CBC AUTOMATED: CPT

## 2024-02-01 PROCEDURE — 85045 AUTOMATED RETICULOCYTE COUNT: CPT

## 2024-02-01 PROCEDURE — 86481 TB AG RESPONSE T-CELL SUSP: CPT

## 2024-02-01 PROCEDURE — 83550 IRON BINDING TEST: CPT

## 2024-02-01 PROCEDURE — 83615 LACTATE (LD) (LDH) ENZYME: CPT

## 2024-02-01 PROCEDURE — 83540 ASSAY OF IRON: CPT

## 2024-02-01 PROCEDURE — 83521 IG LIGHT CHAINS FREE EACH: CPT

## 2024-02-01 PROCEDURE — 86431 RHEUMATOID FACTOR QUANT: CPT

## 2024-02-01 PROCEDURE — 86140 C-REACTIVE PROTEIN: CPT

## 2024-02-01 PROCEDURE — 85652 RBC SED RATE AUTOMATED: CPT

## 2024-02-01 PROCEDURE — 80053 COMPREHEN METABOLIC PANEL: CPT

## 2024-02-01 PROCEDURE — 82728 ASSAY OF FERRITIN: CPT

## 2024-02-01 PROCEDURE — 82668 ASSAY OF ERYTHROPOIETIN: CPT

## 2024-02-01 PROCEDURE — 85025 COMPLETE CBC W/AUTO DIFF WBC: CPT

## 2024-02-01 PROCEDURE — 83010 ASSAY OF HAPTOGLOBIN QUANT: CPT

## 2024-02-01 NOTE — TELEPHONE ENCOUNTER
Left detailed VM that she is to go back to the lab to complete t-spot as to help develop her treatment plan. If she has further concerns/questions then she is to message/call the office.

## 2024-02-02 LAB
ALBUMIN SERPL BCP-MCNC: 4 G/DL (ref 3.4–5)
ALP SERPL-CCNC: 79 U/L (ref 33–136)
ALT SERPL W P-5'-P-CCNC: 14 U/L (ref 7–45)
ANION GAP SERPL CALC-SCNC: 12 MMOL/L (ref 10–20)
AST SERPL W P-5'-P-CCNC: 13 U/L (ref 9–39)
BASOPHILS # BLD AUTO: 0.03 X10*3/UL (ref 0–0.1)
BASOPHILS NFR BLD AUTO: 0.4 %
BILIRUB SERPL-MCNC: 0.3 MG/DL (ref 0–1.2)
BUN SERPL-MCNC: 11 MG/DL (ref 6–23)
CALCIUM SERPL-MCNC: 9.1 MG/DL (ref 8.6–10.6)
CHLORIDE SERPL-SCNC: 104 MMOL/L (ref 98–107)
CO2 SERPL-SCNC: 26 MMOL/L (ref 21–32)
CREAT SERPL-MCNC: 0.61 MG/DL (ref 0.5–1.05)
CRP SERPL-MCNC: 4.31 MG/DL
EGFRCR SERPLBLD CKD-EPI 2021: >90 ML/MIN/1.73M*2
EOSINOPHIL # BLD AUTO: 0.17 X10*3/UL (ref 0–0.7)
EOSINOPHIL NFR BLD AUTO: 2.4 %
ERYTHROCYTE [DISTWIDTH] IN BLOOD BY AUTOMATED COUNT: 15.9 % (ref 11.5–14.5)
ERYTHROCYTE [SEDIMENTATION RATE] IN BLOOD BY WESTERGREN METHOD: 75 MM/H (ref 0–30)
FERRITIN SERPL-MCNC: 421 NG/ML (ref 8–150)
FERRITIN SERPL-MCNC: 423 NG/ML (ref 8–150)
GLUCOSE SERPL-MCNC: 105 MG/DL (ref 74–99)
HAPTOGLOB SERPL-MCNC: 255 MG/DL (ref 37–246)
HCT VFR BLD AUTO: 38.4 % (ref 36–46)
HGB BLD-MCNC: 11.7 G/DL (ref 12–16)
IMM GRANULOCYTES # BLD AUTO: 0.02 X10*3/UL (ref 0–0.7)
IMM GRANULOCYTES NFR BLD AUTO: 0.3 % (ref 0–0.9)
IRON SATN MFR SERPL: 18 % (ref 25–45)
IRON SATN MFR SERPL: 18 % (ref 25–45)
IRON SERPL-MCNC: 49 UG/DL (ref 35–150)
IRON SERPL-MCNC: 50 UG/DL (ref 35–150)
KAPPA LC SERPL-MCNC: 3.18 MG/DL (ref 0.33–1.94)
KAPPA LC/LAMBDA SER: 1.4 {RATIO} (ref 0.26–1.65)
LABORATORY COMMENT REPORT: NORMAL
LAMBDA LC SERPL-MCNC: 2.27 MG/DL (ref 0.57–2.63)
LDH SERPL L TO P-CCNC: 158 U/L (ref 84–246)
LYMPHOCYTES # BLD AUTO: 2.77 X10*3/UL (ref 1.2–4.8)
LYMPHOCYTES NFR BLD AUTO: 39 %
MCH RBC QN AUTO: 25.9 PG (ref 26–34)
MCHC RBC AUTO-ENTMCNC: 30.5 G/DL (ref 32–36)
MCV RBC AUTO: 85 FL (ref 80–100)
MONOCYTES # BLD AUTO: 0.58 X10*3/UL (ref 0.1–1)
MONOCYTES NFR BLD AUTO: 8.2 %
NEUTROPHILS # BLD AUTO: 3.53 X10*3/UL (ref 1.2–7.7)
NEUTROPHILS NFR BLD AUTO: 49.7 %
NRBC BLD-RTO: 0 /100 WBCS (ref 0–0)
PATH REPORT.FINAL DX SPEC: NORMAL
PATH REPORT.GROSS SPEC: NORMAL
PATH REPORT.MICROSCOPIC SPEC OTHER STN: NORMAL
PATH REPORT.RELEVANT HX SPEC: NORMAL
PATH REPORT.TOTAL CANCER: NORMAL
PLATELET # BLD AUTO: 284 X10*3/UL (ref 150–450)
PMV BLD AUTO: 11.1 FL (ref 7.5–11.5)
POTASSIUM SERPL-SCNC: 4.4 MMOL/L (ref 3.5–5.3)
PROT SERPL-MCNC: 7.2 G/DL (ref 6.4–8.2)
RBC # BLD AUTO: 4.51 X10*6/UL (ref 4–5.2)
RHEUMATOID FACT SER NEPH-ACNC: <10 IU/ML (ref 0–15)
SODIUM SERPL-SCNC: 138 MMOL/L (ref 136–145)
TIBC SERPL-MCNC: 278 UG/DL (ref 240–445)
TIBC SERPL-MCNC: 283 UG/DL (ref 240–445)
UIBC SERPL-MCNC: 229 UG/DL (ref 110–370)
UIBC SERPL-MCNC: 233 UG/DL (ref 110–370)
WBC # BLD AUTO: 7.1 X10*3/UL (ref 4.4–11.3)

## 2024-02-03 LAB — EPO SERPL-ACNC: 15 MU/ML (ref 4–27)

## 2024-02-05 ENCOUNTER — HOSPITAL ENCOUNTER (OUTPATIENT)
Dept: RADIOLOGY | Facility: HOSPITAL | Age: 64
Discharge: HOME | End: 2024-02-05
Payer: COMMERCIAL

## 2024-02-05 DIAGNOSIS — C50.111 CANCER OF CENTRAL PORTION OF RIGHT BREAST (MULTI): ICD-10-CM

## 2024-02-05 DIAGNOSIS — N23 RENAL PAIN: ICD-10-CM

## 2024-02-05 PROCEDURE — 74183 MRI ABD W/O CNTR FLWD CNTR: CPT

## 2024-02-05 PROCEDURE — 74183 MRI ABD W/O CNTR FLWD CNTR: CPT | Performed by: STUDENT IN AN ORGANIZED HEALTH CARE EDUCATION/TRAINING PROGRAM

## 2024-02-05 PROCEDURE — 76770 US EXAM ABDO BACK WALL COMP: CPT | Performed by: RADIOLOGY

## 2024-02-05 PROCEDURE — 2550000001 HC RX 255 CONTRASTS

## 2024-02-05 PROCEDURE — A9575 INJ GADOTERATE MEGLUMI 0.1ML: HCPCS

## 2024-02-05 PROCEDURE — 76770 US EXAM ABDO BACK WALL COMP: CPT

## 2024-02-05 RX ORDER — GADOTERATE MEGLUMINE 376.9 MG/ML
26 INJECTION INTRAVENOUS
Status: COMPLETED | OUTPATIENT
Start: 2024-02-05 | End: 2024-02-05

## 2024-02-05 RX ADMIN — GADOTERATE MEGLUMINE 26 ML: 376.9 INJECTION INTRAVENOUS at 17:58

## 2024-02-08 ENCOUNTER — INFUSION (OUTPATIENT)
Dept: HEMATOLOGY/ONCOLOGY | Facility: CLINIC | Age: 64
End: 2024-02-08
Payer: COMMERCIAL

## 2024-02-08 ENCOUNTER — OFFICE VISIT (OUTPATIENT)
Dept: HEMATOLOGY/ONCOLOGY | Facility: CLINIC | Age: 64
End: 2024-02-08
Payer: COMMERCIAL

## 2024-02-08 VITALS
SYSTOLIC BLOOD PRESSURE: 138 MMHG | BODY MASS INDEX: 48.79 KG/M2 | WEIGHT: 293 LBS | TEMPERATURE: 97.5 F | OXYGEN SATURATION: 97 % | HEART RATE: 71 BPM | DIASTOLIC BLOOD PRESSURE: 87 MMHG | RESPIRATION RATE: 16 BRPM

## 2024-02-08 DIAGNOSIS — D50.8 OTHER IRON DEFICIENCY ANEMIA: ICD-10-CM

## 2024-02-08 DIAGNOSIS — D50.9 IRON DEFICIENCY ANEMIA, UNSPECIFIED IRON DEFICIENCY ANEMIA TYPE: ICD-10-CM

## 2024-02-08 PROCEDURE — 1036F TOBACCO NON-USER: CPT

## 2024-02-08 PROCEDURE — 3008F BODY MASS INDEX DOCD: CPT

## 2024-02-08 PROCEDURE — 99214 OFFICE O/P EST MOD 30 MIN: CPT

## 2024-02-08 ASSESSMENT — PAIN SCALES - GENERAL: PAINLEVEL: 2

## 2024-02-08 NOTE — PROGRESS NOTES
Patient Visit Information:   Visit Type: Follow up Visit      Cancer History:   Treatment Synopsis:       Breast         AJCC Edition: 8th (AJCC), Diagnosis Date: 27-Jul-2021, IA, pT1b pN0 cM0 G2     Treatment Synopsis:    61-year-old postmenopausal AA female with rightt-sided invasive lobular carcinoma, stage IA (T1b N0 M0). The patient's breast cancer was diagnosed on July 27, 2021,  and is estrogen receptor positive at >95%, progesterone receptor positive at >95%, and HER-2/xiao negative, grade 2 with MammaPrint Index = +0.120; translating to Low Risk Luminal A with a 10-year risk of recurrence of 10%. Details of her history are as  follows:       07/21/2021: Patient underwent a bilateral MRI for screening due to dense breast. This showed a mass measuring 0.8 x 0.8 x 0.7 cm mass. No axillary  or internal mammary LN appreciated   07/27/2021: Second look targeted US of the right breast and axilla-revealed 5 normal looking LNs.    07/27/2021: Patient underwent a US-guided biopsy of the right breast at 12:00, 4 cm from the nipple. Pathology was consistent with results as above   08/19/2021: Patient underwent a right partial mastectomy with SLNB. Pathology showed a 0.6cminvasive carcinoma, grade 2, with 0/2 SLNs involved. Margins were negative   2/23/2022: Completed Radiation   03/03/2022: Started Arimidex  1/17/2024 patient switched from anastrozole to letrozole and is tolerating better.  Hematology history:  Patient is a pleasant 63-year-old female presents today for elevated D-dimer following emergency room visit.  CT angio chest was performed and did not see any signs of pulmonary embolism.  D-dimer elevated 966, CRP elevated.  Conducted a flu and COVID swab which was negative.  She followed up with cardiology 1/12/2023 and was cleared and instructed to only follow-up as needed.  They performed a CT spine which revealed degenerative joint disease and foraminal stenosis at C5-7.  No abnormal intracranial  abnormality.  CT of head performed with no acute concerns or hemorrhage.     Diagnosis of hormone positive breast cancer diagnosed July 21, 2021.  Treatment synopsis as above. Bone survey did not show lytic lesions. She had a recent EGD and colonoscopy 10/10/2023 due to abdominal- right upper quadrant.  Biopsies for celiac disease pending.  Internal hemorrhoids identified no other abnormal findings.  On 09/26/2023 CT abdomen pelvis shows hypodense liver lesion.  The stability since 2020 indicating benign etiology.  Ultrasound of gallbladder same date shows diffusely increased hepatic echogenicity may be seen with hepatic steatosis or hepatocellular disease, likely contributing to anemia. X-ray completed October 19, 2023 that showed no acute cardiopulmonary process.  She reports they were ruling out pneumonia.  Bilateral mammogram pending, previous 12/2022 recommended 1 year screening no malignancy identified.    History of Present Illness:      ID Statement:       Calvin Monroe is a 63 year old female       Chief Complaint: Follow-up   Interval History:    She reports still experiencing headaches though most of her other symptoms have resolved.  She feels somewhat tired but not as bad after she was taken off of anastrozole by her rheumatologist.  She started letrozole and is tolerating so far.  Received iron infusions.  Reports normal appetite no weight loss.  Reports night sweats occasionally that have decreased and being followed by Dr. Burris clinic.    She notes improvement in her joint pain since being taken off of anastrozole.  The pain she experienced left lower kidney region has improved.  Renal ultrasound 2/6/2024 normal.  An MRI of liver 2/6/2024 shows likely hemangioma and does not note any lymphadenopathy or suspicious masses.  She was ordered a PET scan by her rheumatologist though she reports advised by the breast cancer clinic that she should avoid additional contrast agent testing.  At this  time a PET scan does not seem necessary.     She does report new possibly infected biopsy on her left breast following dermatology visit.  Patient denies cough, visual or hearing changes, oral sores or lesions of the skin, shortness of breath, problem swallowing, pain in the chest, current urinary issues, diarrhea, constipation, unusual vaginal discharge or dryness, any new lymphadenopathy or changes in her chest/breast area.  She reports her breast cancer was discovered during a routine mammogram screening.  She is tolerating her new medication letrozole with less fatigue.     Medical history:  -Edema in lower extremities  -Irritable bowel syndrome  -Multiple sclerosis  -Right side breast cancer hormone positive on anastrozole  -Perimenopausal  -Hematuria  -Partial meniscectomy  -Umbilical hernia repair     Social history:  -She lives with  and has 3 healthy children and multiple grandchildren  -Homemaker  -She never smoked  -She does not drink other than wine occasionally marijuana Gummies for pain as needed recently  -No illicit drug use     Family history:  -Maternal grandmother stomach and liver cancer  -Paternal grandmother: Breast cancer  -Paternal uncle colon cancer  -No other known hematologic, genetic or cancer diagnosis in first-degree relatives     Review of Systems:   A review of systems has been completed and are negative for complaints except what is stated in the assessment, HPI, IH, and/or past medical history.           Allergies and Intolerances:       Allergies:             Allergies   Allergen Reactions    Adhesive Tape-Silicones Itching    Latex Hives    Meperidine Other       GI upset    Morphine Other       GI upset         Outpatient Medication Profile:             Current Outpatient Medications   Medication Instructions    anastrozole (Arimidex) 1 mg tablet 1 tablet, oral, Daily    furosemide (Lasix) 20 mg tablet 1 tablet, oral, Daily PRN    hyoscyamine (ANASPAZ, LEVSIN) 0.125 mg,  oral, Every 6 hours PRN    venlafaxine (Effexor) 37.5 mg tablet 1 tablet, oral, Daily         Performance:   ECOG Performance Status: 0         Vitals and Measurements:   Visit Vitals  /87 (BP Location: Left arm)   Pulse 71   Temp 36.4 °C (97.5 °F)   Resp 16      Vitals:    02/08/24 1339   Weight: 133 kg (293 lb 3.4 oz)           Physical Exam:      Constitutional: Patient is awake/alert/oriented  x3, no distress, Nourished, hydrated, alert and cooperative   Eyes: PERRL, EOMI, clear sclera   ENMT: mucous membranes moist, no oral lesions    Head/Neck: Neck supple, no apparent injury, thyroid  without mass or tenderness, No JVD, trachea midline, no bruits   Respiratory/Thorax: Patent airways, CTAB, chest symmetry, normal inspiratory and expiratory effort    Cardiovascular: Regular, rate and rhythm, normal S1 and S2, no carotid bruit   Gastrointestinal: Non-distended, soft, right upper quad tenderness with palpation, no masses or organomegaly appreciated   Genitourinary: deferred   Musculoskeletal: Pain in lumbar spine with palpation, lower right hip discomfort with palpation, normal strength for baseline    Extremities: Mild nonpitting ankle edema bilateral, normal strength, good circulation pulses   Neurological: alert and oriented x3, intact senses,  motor, response and reflexes, normal strength, cranial nerves normal   Breast: Left breast, slight bleeding around lesion with drainage tan in color following what appears to be biopsy location   Lymphatic: No significant lymphadenopathy   Psychological: Appropriate mood and behavior   Skin: dry, no lesions, no rashes         Lab Results:   Labs:  Lab Results   Component Value Date    WBC 7.1 02/01/2024    WBC 7.8 02/01/2024    NEUTROABS 3.53 02/01/2024    IGABSOL 0.02 02/01/2024    LYMPHSABS 2.77 02/01/2024    MONOSABS 0.58 02/01/2024    EOSABS 0.17 02/01/2024    BASOSABS 0.03 02/01/2024    RBC 4.51 02/01/2024    RBC 4.52 02/01/2024    MCV 85 02/01/2024    MCV 85  "02/01/2024    MCHC 30.5 (L) 02/01/2024    MCHC 30.4 (L) 02/01/2024    HGB 11.7 (L) 02/01/2024    HGB 11.6 (L) 02/01/2024    HCT 38.4 02/01/2024    HCT 38.2 02/01/2024     02/01/2024     02/01/2024     Lab Results   Component Value Date    RETICCTPCT 1.7 02/01/2024      Lab Results   Component Value Date    CREATININE 0.61 02/01/2024    BUN 11 02/01/2024    EGFR >90 02/01/2024     02/01/2024    K 4.4 02/01/2024     02/01/2024    CO2 26 02/01/2024      Lab Results   Component Value Date    ALT 14 02/01/2024    AST 13 02/01/2024    ALKPHOS 79 02/01/2024    BILITOT 0.3 02/01/2024      Lab Results   Component Value Date    TSH 0.48 08/01/2022     Lab Results   Component Value Date    TSH 0.48 08/01/2022     Lab Results   Component Value Date    IRON 49 02/01/2024    IRON 50 02/01/2024    TIBC 278 02/01/2024    TIBC 283 02/01/2024    FERRITIN 423 (H) 02/01/2024    FERRITIN 421 (H) 02/01/2024      No results found for: \"[id number]\"   No results found for: \"FOLATE\"  Lab Results   Component Value Date    WARD Negative 10/26/2023    RF <10 02/01/2024    SEDRATE 75 (H) 02/01/2024      Lab Results   Component Value Date    CRP 4.31 (H) 02/01/2024      No results found for: \"YAHAIRA\"  Lab Results   Component Value Date     02/01/2024     Lab Results   Component Value Date    HAPTOGLOBIN 255 (H) 02/01/2024     Lab Results   Component Value Date    SPEP Normal. 10/26/2023     Lab Results   Component Value Date    IGG 1,450 10/26/2023     10/26/2023     (H) 10/26/2023         Assessment and Plan:   #1.  Elevated D-dimer none DVT  Ms. Avery Huitron is a pleasant 63-year-old female that presents for follow-up from being in the emergency room with an elevated D-dimer.  She had previously seen for iron deficiency and was infused x 2 Feraheme in November 2023. Tolerated well and with no side effects.  We will repeat these labs in 2 weeks as well as a D-dimer.  I have scheduled more iron for 3 " months as needed with clinic appointment.  She has no symptoms today and actually improves overall feeling better.     #2.  Iron deficiency anemia  At initial visit hemoglobin 10.8, ferritin 108, iron TSAT 10%, ESR and CRP elevated.  MCV 81, previously microcytic.  GFR above 90.  Retic percent calculated 1.4.  Infusion received 10/31 and 11/07.  Discussed the potential causes of iron deficiency anemia.  Her inflammatory markers are elevated.  Full anemia work-up is negative for concerning markers.  Hemoglobin today is 11.6 remained stable.  Her SPEP is normal, kappa light chains elevated likely inflammatory, immunoglobulins IgA slightly elevated at 409, WARD negative.   Due to patient's complaint of bone pain in lower back, skeletal survey ordered and negative for lytic or concerning myeloma lesions. Notable degenerative changes. Referral to Rheumatology for elevated ESR, CRP and musculoskeletal pain.  Rheumatology ordered a PET/CT.  She has not had the procedure done due to being advised by breast cancer clinic that she has had a lot of imaging and should hold off on contrast.  I agree with this.  PET/CT is not warranted at this time.  We will continue to monitor immunoglobulins and inflammatory markers along with anemia labs.     #3.  Right lower back pain radiating to front at times  Previous ultrasound of abdomen revealed lesion on liver likely hemangioma.  She does mention that this pain radiates around her back to her front.  Therefore, ultrasound of kidneys has been ordered.  MRI of liver.  Possible causes, urinary tract infection versus kidney stone versus musculoskeletal discomfort.  Ordered urinalysis with reflex to microscopic.  Negative for urinary tract infection.  Renal ultrasound and MRI of liver completed.  Renal ultrasound unremarkable and MRI shows hemangioma.  There were no other factors identified of concern for malignancy.  She does note that this pain has decreased over time.    #4.  Infected  biopsy on breast   During assessment patient questioned the biopsy location.  She states the skin has been bleeding and draining.  Upon assessment there is a circular lesion on the left lateral breast likely from a punch biopsy.  Slightly infected versus delayed healing process.  Advised to follow with dermatology.  Advised over-the-counter bacitracin as needed.    follow-up:     RTC:  -5/16/2024 for 3-month evaluation     Labs:  Anemia labs, inflammatory markers    Medications:  -Advised bacitracin on the infected lesion and to follow-up with dermatology    Imaging:  -- No imaging ordered today     Referrals:  -- Primary care as scheduled 3/15/2024  --Ophthalmology 9/7/2024  --Sleep medicine 1/17/2025  -Dermatology 1/29/2025  --Rheumatology 4/10/2024        Patient Instructions summary:  Discussed evaluation of symptoms.  Previous ultrasound of abdomen revealed lesion on liver likely hemangioma.  She does mention that this pain radiates around her back to her front and reports it almost gone.  Continue to monitor.  Laboratory assessment and imaging stable.  We will continue to monitor labs.  Discussed that I cannot rule out malignancy, though at this time there are no clinical signs and symptoms. She states agreement and understanding to plan.  She will call with any questions or concerns.     Thank you for allowing me to care for you today.     Sincerely,  Aarti Mcdonald, ZAID-CNP

## 2024-02-09 ENCOUNTER — TELEPHONE (OUTPATIENT)
Dept: DERMATOLOGY | Facility: CLINIC | Age: 64
End: 2024-02-09

## 2024-02-09 DIAGNOSIS — T14.8XXA WOUND INFECTION: Primary | ICD-10-CM

## 2024-02-09 DIAGNOSIS — L08.9 WOUND INFECTION: Primary | ICD-10-CM

## 2024-02-09 RX ORDER — MUPIROCIN 20 MG/G
OINTMENT TOPICAL
Qty: 22 G | Refills: 0 | Status: SHIPPED | OUTPATIENT
Start: 2024-02-09 | End: 2024-02-19

## 2024-02-09 NOTE — TELEPHONE ENCOUNTER
I sent a prescription for mupirocin to local pharmacy on file. Treat twice daily for 10 days and notify me if not improvement or if her condition worsens.

## 2024-02-09 NOTE — TELEPHONE ENCOUNTER
Patient called stating she was recently at her hem onc appt,, and her provider noted the biopsy site on the breast taken on 1/24 appears to be infected. Patient was called and left a voicemail to see if it was in an area she would be able to send a picture through [employer]. Also asked about any tenderness, heat, redness, odor, or oozing from the area. Patient was provided the nurseline number to call back at her convenience.

## 2024-02-09 NOTE — TELEPHONE ENCOUNTER
Patient called back stating she was unable to upload a picture into [employer] of the site, but it is red and warm. Please advise.

## 2024-02-15 ENCOUNTER — APPOINTMENT (OUTPATIENT)
Dept: HEMATOLOGY/ONCOLOGY | Facility: CLINIC | Age: 64
End: 2024-02-15
Payer: COMMERCIAL

## 2024-03-13 ENCOUNTER — TELEPHONE (OUTPATIENT)
Dept: RHEUMATOLOGY | Facility: CLINIC | Age: 64
End: 2024-03-13

## 2024-03-13 DIAGNOSIS — M17.10 PRIMARY OSTEOARTHRITIS OF KNEE, UNSPECIFIED LATERALITY: Primary | ICD-10-CM

## 2024-03-13 RX ORDER — DICLOFENAC SODIUM 10 MG/G
4 GEL TOPICAL 4 TIMES DAILY
Qty: 100 G | Refills: 1 | Status: SHIPPED | OUTPATIENT
Start: 2024-03-13 | End: 2024-03-20 | Stop reason: SDUPTHER

## 2024-03-15 ENCOUNTER — OFFICE VISIT (OUTPATIENT)
Dept: PRIMARY CARE | Facility: CLINIC | Age: 64
End: 2024-03-15
Payer: COMMERCIAL

## 2024-03-15 VITALS
RESPIRATION RATE: 12 BRPM | DIASTOLIC BLOOD PRESSURE: 70 MMHG | WEIGHT: 291.4 LBS | SYSTOLIC BLOOD PRESSURE: 110 MMHG | TEMPERATURE: 95.5 F | OXYGEN SATURATION: 98 % | HEART RATE: 78 BPM | HEIGHT: 65 IN | BODY MASS INDEX: 48.55 KG/M2

## 2024-03-15 DIAGNOSIS — R73.03 PREDIABETES: ICD-10-CM

## 2024-03-15 DIAGNOSIS — Z00.00 ANNUAL PHYSICAL EXAM: Primary | ICD-10-CM

## 2024-03-15 DIAGNOSIS — E66.01 CLASS 3 SEVERE OBESITY WITH SERIOUS COMORBIDITY AND BODY MASS INDEX (BMI) OF 45.0 TO 49.9 IN ADULT, UNSPECIFIED OBESITY TYPE (MULTI): ICD-10-CM

## 2024-03-15 PROCEDURE — 99396 PREV VISIT EST AGE 40-64: CPT | Performed by: STUDENT IN AN ORGANIZED HEALTH CARE EDUCATION/TRAINING PROGRAM

## 2024-03-15 PROCEDURE — 1036F TOBACCO NON-USER: CPT | Performed by: STUDENT IN AN ORGANIZED HEALTH CARE EDUCATION/TRAINING PROGRAM

## 2024-03-15 PROCEDURE — 3008F BODY MASS INDEX DOCD: CPT | Performed by: STUDENT IN AN ORGANIZED HEALTH CARE EDUCATION/TRAINING PROGRAM

## 2024-03-15 RX ORDER — DULAGLUTIDE 0.75 MG/.5ML
0.75 INJECTION, SOLUTION SUBCUTANEOUS
Qty: 2 ML | Refills: 1 | Status: SHIPPED | OUTPATIENT
Start: 2024-03-15 | End: 2024-05-07 | Stop reason: SDUPTHER

## 2024-03-15 ASSESSMENT — PATIENT HEALTH QUESTIONNAIRE - PHQ9
1. LITTLE INTEREST OR PLEASURE IN DOING THINGS: NOT AT ALL
2. FEELING DOWN, DEPRESSED OR HOPELESS: NOT AT ALL
SUM OF ALL RESPONSES TO PHQ9 QUESTIONS 1 AND 2: 0

## 2024-03-15 NOTE — PATIENT INSTRUCTIONS
BMI was above normal measurement. Current weight: 132 kg (291 lb 6.4 oz)  Weight change since last visit (-) denotes wt loss -1.81 lbs   Weight loss needed to achieve BMI 25: 141.5 Lbs  Weight loss needed to achieve BMI 30: 111.5 Lbs  Provided instructions on dietary changes  Provided instructions on exercise  Advised to Increase physical activity.  Continue with current medications.  If you receive medical information from My Chart, your results will be released into your online chart. This means you may view or see results before someone from our office contact you directly.  Please keep in mind that if blood work or imaging were ordered during your visit, all the nonurgent lab results will be discussed with you at your next office visit.  Please arrive 15 minutes before your appointment.   Follow-up with primary care in 3 months or as needed

## 2024-03-15 NOTE — PROGRESS NOTES
"Subjective   Patient ID: Calvin Varela-Field \"Ashlyn" is a pleasant 63 y.o. female with significant history of right breast cancer treated with lumpectomy radiation who presents for Annual Exam (Physical- no concerns).  HPI    Health Maintenance:  -   Colonoscopy: October 2023, repeat in 10 years  -   Mammogram: Follows up with breast clinic/oncology  -   Bone density DEXA: At 66  Immunizations:  - COVID vaccination status:  - Influenza: Declined  - Shingles: Declined  - TDAP:  - Pneumo Vaccine: prevnar 20 in 2022.     Overall doing well.  Up-to-date with annual eye and dental exam.  Reports that she is feeling much better since her anastrozole was changed to letrozole.  She is interested in weight loss counseling.  She has been prediabetic.  We discussed about medication and treatment options for weight loss.  She was counseled on adhering to a low calorie diet and an exercise routine which she is agreeable with.    Review of Systems   All other systems reviewed and are negative.      Visit Vitals  /70 (Patient Position: Sitting)   Pulse 78   Temp 35.3 °C (95.5 °F)   Resp 12          Objective   Physical Exam  Constitutional:       General: She is not in acute distress.     Appearance: Normal appearance. She is obese.   HENT:      Head: Normocephalic and atraumatic.   Eyes:      General: No scleral icterus.     Conjunctiva/sclera: Conjunctivae normal.   Cardiovascular:      Rate and Rhythm: Normal rate and regular rhythm.      Heart sounds: Normal heart sounds.   Pulmonary:      Effort: Pulmonary effort is normal.      Breath sounds: Normal breath sounds. No wheezing.   Abdominal:      General: Bowel sounds are normal. There is no distension.      Palpations: Abdomen is soft.      Tenderness: There is no abdominal tenderness.   Musculoskeletal:      Cervical back: Neck supple.      Right lower leg: No edema.      Left lower leg: No edema.   Lymphadenopathy:      Cervical: No cervical adenopathy. "   Skin:     General: Skin is warm and dry.   Neurological:      General: No focal deficit present.      Mental Status: She is alert and oriented to person, place, and time.   Psychiatric:         Mood and Affect: Mood normal.         Behavior: Behavior normal.         Assessment/Plan   Problem List Items Addressed This Visit       Prediabetes    Relevant Medications    dulaglutide (Trulicity) 0.75 mg/0.5 mL pen injector     Other Visit Diagnoses       Annual physical exam    -  Primary    Class 3 severe obesity with serious comorbidity and body mass index (BMI) of 45.0 to 49.9 in adult, unspecified obesity type (CMS/HCC)        Relevant Orders    Referral to [facility]    Referral to Nutrition Services

## 2024-03-19 ENCOUNTER — PHARMACY VISIT (OUTPATIENT)
Dept: PHARMACY | Facility: CLINIC | Age: 64
End: 2024-03-19
Payer: COMMERCIAL

## 2024-03-19 PROCEDURE — RXMED WILLOW AMBULATORY MEDICATION CHARGE

## 2024-03-20 DIAGNOSIS — M17.10 PRIMARY OSTEOARTHRITIS OF KNEE, UNSPECIFIED LATERALITY: ICD-10-CM

## 2024-03-20 RX ORDER — DICLOFENAC SODIUM 10 MG/G
4 GEL TOPICAL 4 TIMES DAILY
Qty: 100 G | Refills: 1 | Status: SHIPPED | OUTPATIENT
Start: 2024-03-20 | End: 2024-04-10 | Stop reason: SDUPTHER

## 2024-03-20 NOTE — TELEPHONE ENCOUNTER
Patient called and indicated her refill on Diclofenac was sent to the wrong place, a mailaway pharmacy in [place]. She said its supposed to go to [facility] at  [phone] (which is stated in previous message).

## 2024-04-10 ENCOUNTER — LAB (OUTPATIENT)
Dept: LAB | Facility: LAB | Age: 64
End: 2024-04-10
Payer: COMMERCIAL

## 2024-04-10 ENCOUNTER — OFFICE VISIT (OUTPATIENT)
Dept: RHEUMATOLOGY | Facility: CLINIC | Age: 64
End: 2024-04-10
Payer: COMMERCIAL

## 2024-04-10 ENCOUNTER — HOSPITAL ENCOUNTER (OUTPATIENT)
Dept: RADIOLOGY | Facility: CLINIC | Age: 64
Discharge: HOME | End: 2024-04-10
Payer: COMMERCIAL

## 2024-04-10 VITALS
HEART RATE: 74 BPM | TEMPERATURE: 97.5 F | BODY MASS INDEX: 47.45 KG/M2 | HEIGHT: 65 IN | SYSTOLIC BLOOD PRESSURE: 149 MMHG | DIASTOLIC BLOOD PRESSURE: 69 MMHG | WEIGHT: 284.8 LBS

## 2024-04-10 DIAGNOSIS — M17.10 PRIMARY OSTEOARTHRITIS OF KNEE, UNSPECIFIED LATERALITY: ICD-10-CM

## 2024-04-10 LAB
ALBUMIN SERPL BCP-MCNC: 4 G/DL (ref 3.4–5)
ALP SERPL-CCNC: 71 U/L (ref 33–136)
ALT SERPL W P-5'-P-CCNC: 12 U/L (ref 7–45)
ANION GAP SERPL CALC-SCNC: 12 MMOL/L (ref 10–20)
AST SERPL W P-5'-P-CCNC: 12 U/L (ref 9–39)
BILIRUB SERPL-MCNC: 0.4 MG/DL (ref 0–1.2)
BUN SERPL-MCNC: 12 MG/DL (ref 6–23)
C3 SERPL-MCNC: 165 MG/DL (ref 87–200)
C4 SERPL-MCNC: 58 MG/DL (ref 10–50)
CALCIUM SERPL-MCNC: 9.6 MG/DL (ref 8.6–10.6)
CHLORIDE SERPL-SCNC: 103 MMOL/L (ref 98–107)
CO2 SERPL-SCNC: 28 MMOL/L (ref 21–32)
CREAT SERPL-MCNC: 0.65 MG/DL (ref 0.5–1.05)
CRP SERPL-MCNC: 4.75 MG/DL
DSDNA AB SER-ACNC: <1 IU/ML
EGFRCR SERPLBLD CKD-EPI 2021: >90 ML/MIN/1.73M*2
ERYTHROCYTE [DISTWIDTH] IN BLOOD BY AUTOMATED COUNT: 14.4 % (ref 11.5–14.5)
FERRITIN SERPL-MCNC: 418 NG/ML (ref 8–150)
GLUCOSE SERPL-MCNC: 85 MG/DL (ref 74–99)
HCT VFR BLD AUTO: 37.5 % (ref 36–46)
HGB BLD-MCNC: 11.9 G/DL (ref 12–16)
MCH RBC QN AUTO: 26.2 PG (ref 26–34)
MCHC RBC AUTO-ENTMCNC: 31.7 G/DL (ref 32–36)
MCV RBC AUTO: 83 FL (ref 80–100)
NRBC BLD-RTO: 0 /100 WBCS (ref 0–0)
PLATELET # BLD AUTO: 299 X10*3/UL (ref 150–450)
POTASSIUM SERPL-SCNC: 4.3 MMOL/L (ref 3.5–5.3)
PROT SERPL-MCNC: 6.9 G/DL (ref 6.4–8.2)
RBC # BLD AUTO: 4.54 X10*6/UL (ref 4–5.2)
SODIUM SERPL-SCNC: 139 MMOL/L (ref 136–145)
WBC # BLD AUTO: 6.4 X10*3/UL (ref 4.4–11.3)

## 2024-04-10 PROCEDURE — 86140 C-REACTIVE PROTEIN: CPT

## 2024-04-10 PROCEDURE — 36415 COLL VENOUS BLD VENIPUNCTURE: CPT

## 2024-04-10 PROCEDURE — 85027 COMPLETE CBC AUTOMATED: CPT

## 2024-04-10 PROCEDURE — 3008F BODY MASS INDEX DOCD: CPT | Performed by: STUDENT IN AN ORGANIZED HEALTH CARE EDUCATION/TRAINING PROGRAM

## 2024-04-10 PROCEDURE — 82728 ASSAY OF FERRITIN: CPT

## 2024-04-10 PROCEDURE — 73560 X-RAY EXAM OF KNEE 1 OR 2: CPT | Mod: BILATERAL PROCEDURE | Performed by: RADIOLOGY

## 2024-04-10 PROCEDURE — 99213 OFFICE O/P EST LOW 20 MIN: CPT | Performed by: STUDENT IN AN ORGANIZED HEALTH CARE EDUCATION/TRAINING PROGRAM

## 2024-04-10 PROCEDURE — 86225 DNA ANTIBODY NATIVE: CPT

## 2024-04-10 PROCEDURE — 73560 X-RAY EXAM OF KNEE 1 OR 2: CPT | Mod: 50

## 2024-04-10 PROCEDURE — 99213 OFFICE O/P EST LOW 20 MIN: CPT | Mod: GC | Performed by: STUDENT IN AN ORGANIZED HEALTH CARE EDUCATION/TRAINING PROGRAM

## 2024-04-10 PROCEDURE — 80053 COMPREHEN METABOLIC PANEL: CPT

## 2024-04-10 PROCEDURE — 86160 COMPLEMENT ANTIGEN: CPT

## 2024-04-10 RX ORDER — DICLOFENAC SODIUM 10 MG/G
4 GEL TOPICAL 4 TIMES DAILY
Qty: 100 G | Refills: 3 | Status: SHIPPED | OUTPATIENT
Start: 2024-04-10

## 2024-04-10 ASSESSMENT — PAIN SCALES - GENERAL: PAINLEVEL: 8

## 2024-04-10 NOTE — PROGRESS NOTES
"Subjective   Patient ID: [id number]   Calvin Monroe \"Ashlyn" is a 63 y.o. female who presents for No chief complaint on file..  HPI  64 yo with hx of invasive lobular carcinoma (stage IA) s/p partial mastectomy s/p radiation therapy on anastrazole, and MS, prediabetes, IBS, CRUZ is here for arthralgia evaluation in the setting of elevated CRP.     Feels much 90% better. Still has right knee pain. Everyday, pain worse with activity. Even at rest. Not sure about swelling. No fever.   No low back pain. Lost intentional 7 pounds. Minimal fatigue.      Previous note:  Started after she had radiation a year ago.   Difficulty walking, or standing, feels week in her legs, imbalance.   Legs ache the most. Knees, calfs hurts. Mostly with activity.   Morning stiffness all day long.   Swelling in ankles and toes.  Shoulders right side hurts.   No pain in elbows and hands. Feels they are week.   External hips ache. Mostly with activity.   Intermittent low back pain with sciatica.   No skin rashes.  Mild SOB with exertion  No chest pain  No raynauds  Mild dry eyes  No dry mouth  No mouth or nose ulcers  No trouble swallowing  No dvt  Miscarriage  Leaking urine         Social history:  -She lives with  and has 3 healthy children and multiple grandchildren  -Homemaker  -She never smoked  -She does not drink other than wine occasionally marijuana Gummies for pain as needed recently  -No illicit drug use    Objective   Physical Exam  AO*4  Morbid obesity  Hands without tenderness or active synovitis   No spinal tenderness  Log roll and CHERI negative  Straight leg test + bilaterally  Knees cool to touch without effusion  Ankles and toes without active synovitis   Motor power 5/5    Workup done:    FINDINGS:  No lytic or sclerotic metastatic lesions are identified. Hypertrophic  degenerative endplate changes are noted to severe degree at the C4-5  and C5-6 levels. Moderate to severe disc height loss involves C5-6  and " C6-7. Endplate spurs are also noted in the thoracic spine focally  bridging at T9-10 and T11-12 levels. Severe facet arthropathy is  present at L4-5 and L5-S1. Severe hypertrophic degenerative changes  are noted in both knees.      -CRP 3.35  -ESR 76  -WARD negative      Assessment/Plan   64 yo with hx of invasive lobular carcinoma (stage IA) s/p partial mastectomy s/p radiation therapy on anastrazole, and MS, prediabetes, IBS, CRUZ is here for arthralgia follow up.    Bone skeletal survey with advanced degenerative changes in the spine and knees.   No evidence of an autoimmune rheumatic disease based on her history and physical exam.   Her knee/leg pain is primarily secondary to osteoarthritis. Possible DJD of the spine contributing as well.     Patient with significant improvement after stopping anastrozole. Reports currently feeling 90% better with residual pain in her left knee.     Plan:  -Xray knees ordered  -Continue voltaren gel  -physical therapy for knee/spine OA  -Can consider CSI for knee OA        Teetee Dodd MD   Rheumatology Fellow,PGY-V    Patient seen with Dr. Khan   Note Text (......Xxx Chief Complaint.): This diagnosis correlates with the Other (Free Text): Patient is here for PDT of the face. We discussed treatment, expectations and post care instructions. Patient stated she is not prone to HSV or photosensitive. Patient was given samples of gentle products for home use and a hat for the drive home. \\n\\nDegreased with acetone, applied 1 Levulan stick to face and ears. Patient incubated for 90 minutes then placed under the light for 16:40.  Applied medical barrier cream and spf 50. Follow up 6 weeks. elvis Detail Level: Detailed

## 2024-04-10 NOTE — PROGRESS NOTES
4/10/2024  Rheumatology Attending     I saw and evaluated the patient. I personally obtained the key and critical portions of the history and physical exam or was physically present for key and critical portions performed by the resident/fellow. I reviewed the resident/fellow's documentation and discussed the patient with the resident/fellow. I agree with the resident/fellow's medical decision making as documented in the note.    Diego Khan MD

## 2024-04-11 PROCEDURE — RXMED WILLOW AMBULATORY MEDICATION CHARGE

## 2024-04-13 ENCOUNTER — PHARMACY VISIT (OUTPATIENT)
Dept: PHARMACY | Facility: CLINIC | Age: 64
End: 2024-04-13
Payer: COMMERCIAL

## 2024-04-29 DIAGNOSIS — M17.10 PRIMARY OSTEOARTHRITIS OF KNEE, UNSPECIFIED LATERALITY: Primary | ICD-10-CM

## 2024-05-07 DIAGNOSIS — R73.03 PREDIABETES: ICD-10-CM

## 2024-05-08 ENCOUNTER — LAB (OUTPATIENT)
Dept: LAB | Facility: LAB | Age: 64
End: 2024-05-08
Payer: COMMERCIAL

## 2024-05-08 DIAGNOSIS — M17.10 PRIMARY OSTEOARTHRITIS OF KNEE, UNSPECIFIED LATERALITY: ICD-10-CM

## 2024-05-08 DIAGNOSIS — D50.9 IRON DEFICIENCY ANEMIA, UNSPECIFIED IRON DEFICIENCY ANEMIA TYPE: ICD-10-CM

## 2024-05-08 LAB
ALBUMIN SERPL BCP-MCNC: 4.1 G/DL (ref 3.4–5)
ALP SERPL-CCNC: 77 U/L (ref 33–136)
ALT SERPL W P-5'-P-CCNC: 12 U/L (ref 7–45)
ANION GAP SERPL CALC-SCNC: 15 MMOL/L (ref 10–20)
AST SERPL W P-5'-P-CCNC: 14 U/L (ref 9–39)
BILIRUB SERPL-MCNC: 0.4 MG/DL (ref 0–1.2)
BUN SERPL-MCNC: 14 MG/DL (ref 6–23)
CALCIUM SERPL-MCNC: 9.8 MG/DL (ref 8.6–10.6)
CHLORIDE SERPL-SCNC: 102 MMOL/L (ref 98–107)
CO2 SERPL-SCNC: 27 MMOL/L (ref 21–32)
CREAT SERPL-MCNC: 0.69 MG/DL (ref 0.5–1.05)
EGFRCR SERPLBLD CKD-EPI 2021: >90 ML/MIN/1.73M*2
FERRITIN SERPL-MCNC: 476 NG/ML (ref 8–150)
GLUCOSE SERPL-MCNC: 90 MG/DL (ref 74–99)
IRON SATN MFR SERPL: 15 % (ref 25–45)
IRON SERPL-MCNC: 42 UG/DL (ref 35–150)
LDH SERPL L TO P-CCNC: 221 U/L (ref 84–246)
POTASSIUM SERPL-SCNC: 4.4 MMOL/L (ref 3.5–5.3)
PROT SERPL-MCNC: 7.5 G/DL (ref 6.4–8.2)
SODIUM SERPL-SCNC: 140 MMOL/L (ref 136–145)
TIBC SERPL-MCNC: 284 UG/DL (ref 240–445)
UIBC SERPL-MCNC: 242 UG/DL (ref 110–370)

## 2024-05-08 PROCEDURE — 82607 VITAMIN B-12: CPT

## 2024-05-08 PROCEDURE — 83615 LACTATE (LD) (LDH) ENZYME: CPT

## 2024-05-08 PROCEDURE — 83550 IRON BINDING TEST: CPT

## 2024-05-08 PROCEDURE — 83540 ASSAY OF IRON: CPT

## 2024-05-08 PROCEDURE — 85025 COMPLETE CBC W/AUTO DIFF WBC: CPT

## 2024-05-08 PROCEDURE — RXMED WILLOW AMBULATORY MEDICATION CHARGE

## 2024-05-08 PROCEDURE — 80053 COMPREHEN METABOLIC PANEL: CPT

## 2024-05-08 PROCEDURE — 82728 ASSAY OF FERRITIN: CPT

## 2024-05-08 PROCEDURE — 36415 COLL VENOUS BLD VENIPUNCTURE: CPT

## 2024-05-08 RX ORDER — DULAGLUTIDE 0.75 MG/.5ML
0.75 INJECTION, SOLUTION SUBCUTANEOUS
Qty: 2 ML | Refills: 1 | Status: SHIPPED | OUTPATIENT
Start: 2024-05-12

## 2024-05-09 LAB
BASOPHILS # BLD AUTO: 0.03 X10*3/UL (ref 0–0.1)
BASOPHILS NFR BLD AUTO: 0.4 %
EOSINOPHIL # BLD AUTO: 0.29 X10*3/UL (ref 0–0.7)
EOSINOPHIL NFR BLD AUTO: 4.1 %
ERYTHROCYTE [DISTWIDTH] IN BLOOD BY AUTOMATED COUNT: 14.9 % (ref 11.5–14.5)
HCT VFR BLD AUTO: 40.5 % (ref 36–46)
HGB BLD-MCNC: 12.6 G/DL (ref 12–16)
IMM GRANULOCYTES # BLD AUTO: 0.02 X10*3/UL (ref 0–0.7)
IMM GRANULOCYTES NFR BLD AUTO: 0.3 % (ref 0–0.9)
LYMPHOCYTES # BLD AUTO: 2.85 X10*3/UL (ref 1.2–4.8)
LYMPHOCYTES NFR BLD AUTO: 40.3 %
MCH RBC QN AUTO: 26.4 PG (ref 26–34)
MCHC RBC AUTO-ENTMCNC: 31.1 G/DL (ref 32–36)
MCV RBC AUTO: 85 FL (ref 80–100)
MONOCYTES # BLD AUTO: 0.41 X10*3/UL (ref 0.1–1)
MONOCYTES NFR BLD AUTO: 5.8 %
NEUTROPHILS # BLD AUTO: 3.47 X10*3/UL (ref 1.2–7.7)
NEUTROPHILS NFR BLD AUTO: 49.1 %
NRBC BLD-RTO: 0 /100 WBCS (ref 0–0)
PLATELET # BLD AUTO: 260 X10*3/UL (ref 150–450)
RBC # BLD AUTO: 4.78 X10*6/UL (ref 4–5.2)
VIT B12 SERPL-MCNC: 1023 PG/ML (ref 211–911)
WBC # BLD AUTO: 7.1 X10*3/UL (ref 4.4–11.3)

## 2024-05-13 ENCOUNTER — PHARMACY VISIT (OUTPATIENT)
Dept: PHARMACY | Facility: CLINIC | Age: 64
End: 2024-05-13
Payer: COMMERCIAL

## 2024-05-13 ENCOUNTER — APPOINTMENT (OUTPATIENT)
Dept: ORTHOPEDIC SURGERY | Facility: CLINIC | Age: 64
End: 2024-05-13
Payer: COMMERCIAL

## 2024-05-13 ENCOUNTER — APPOINTMENT (OUTPATIENT)
Dept: ORTHOPEDIC SURGERY | Facility: HOSPITAL | Age: 64
End: 2024-05-13
Payer: COMMERCIAL

## 2024-05-16 ENCOUNTER — OFFICE VISIT (OUTPATIENT)
Dept: HEMATOLOGY/ONCOLOGY | Facility: CLINIC | Age: 64
End: 2024-05-16
Payer: COMMERCIAL

## 2024-05-16 ENCOUNTER — APPOINTMENT (OUTPATIENT)
Dept: HEMATOLOGY/ONCOLOGY | Facility: CLINIC | Age: 64
End: 2024-05-16
Payer: COMMERCIAL

## 2024-05-16 VITALS
BODY MASS INDEX: 47.82 KG/M2 | TEMPERATURE: 97.3 F | WEIGHT: 287.37 LBS | OXYGEN SATURATION: 95 % | RESPIRATION RATE: 16 BRPM | HEART RATE: 74 BPM | DIASTOLIC BLOOD PRESSURE: 82 MMHG | SYSTOLIC BLOOD PRESSURE: 133 MMHG

## 2024-05-16 DIAGNOSIS — D50.9 IRON DEFICIENCY ANEMIA, UNSPECIFIED IRON DEFICIENCY ANEMIA TYPE: ICD-10-CM

## 2024-05-16 DIAGNOSIS — R79.89 ELEVATED FERRITIN: Primary | ICD-10-CM

## 2024-05-16 DIAGNOSIS — R42 DIZZINESS: ICD-10-CM

## 2024-05-16 PROCEDURE — 99214 OFFICE O/P EST MOD 30 MIN: CPT

## 2024-05-16 PROCEDURE — 3008F BODY MASS INDEX DOCD: CPT

## 2024-05-16 ASSESSMENT — PAIN SCALES - GENERAL: PAINLEVEL: 0-NO PAIN

## 2024-05-16 NOTE — PROGRESS NOTES
Patient Visit Information:   Visit Type: Follow up Visit      Cancer History:   Treatment Synopsis:       Breast         AJCC Edition: 8th (AJCC), Diagnosis Date: 27-Jul-2021, IA, pT1b pN0 cM0 G2     Treatment Synopsis:    64-year-old postmenopausal AA female with rightt-sided invasive lobular carcinoma, stage IA (T1b N0 M0). The patient's breast cancer was diagnosed on July 27, 2021,  and is estrogen receptor positive at >95%, progesterone receptor positive at >95%, and HER-2/xiao negative, grade 2 with MammaPrint Index = +0.120; translating to Low Risk Luminal A with a 10-year risk of recurrence of 10%. Details of her history are as  follows:       07/21/2021: Patient underwent a bilateral MRI for screening due to dense breast. This showed a mass measuring 0.8 x 0.8 x 0.7 cm mass. No axillary  or internal mammary LN appreciated   07/27/2021: Second look targeted US of the right breast and axilla-revealed 5 normal looking LNs.    07/27/2021: Patient underwent a US-guided biopsy of the right breast at 12:00, 4 cm from the nipple. Pathology was consistent with results as above   08/19/2021: Patient underwent a right partial mastectomy with SLNB. Pathology showed a 0.6cminvasive carcinoma, grade 2, with 0/2 SLNs involved. Margins were negative   2/23/2022: Completed Radiation   03/03/2022: Started Arimidex  1/17/2024 patient switched from anastrozole to letrozole and is tolerating better.  Hematology history:  Patient is a pleasant 64-year-old female presents today for elevated D-dimer following emergency room visit.  CT angio chest was performed and did not see any signs of pulmonary embolism.  D-dimer elevated 966, CRP elevated.  Conducted a flu and COVID swab which was negative.  She followed up with cardiology 1/12/2023 and was cleared and instructed to only follow-up as needed.  They performed a CT spine which revealed degenerative joint disease and foraminal stenosis at C5-7.  No abnormal intracranial  abnormality.  CT of head performed with no acute concerns or hemorrhage.     Diagnosis of hormone positive breast cancer diagnosed July 21, 2021.  Treatment synopsis as above. Bone survey did not show lytic lesions. She had a recent EGD and colonoscopy 10/10/2023 due to abdominal- right upper quadrant.  Biopsies for celiac disease pending.  Internal hemorrhoids identified no other abnormal findings.  On 09/26/2023 CT abdomen pelvis shows hypodense liver lesion.  The stability since 2020 indicating benign etiology.  Ultrasound of gallbladder same date shows diffusely increased hepatic echogenicity may be seen with hepatic steatosis or hepatocellular disease, likely contributing to anemia. X-ray completed October 19, 2023 that showed no acute cardiopulmonary process.  She reports they were ruling out pneumonia.  Bilateral mammogram pending, previous 12/2022 recommended 1 year screening no malignancy identified.    History of Present Illness:      ID Statement:       Calvin Monroe is a 64 year old female       Chief Complaint: Follow-up   Interval History:    She reports still experiencing headaches though most of her other symptoms have resolved.  Notes the dizziness that she feels difficult to explain.  She reports lightheadedness where there is a feeling of an electrical disconnect through her body to her brain.  She states it does come and go when she bends down and sits up.  It also occurs when she is sitting still.  She states she almost went to the ER the other day but did not feel that they would listen to her.  She feels somewhat tired but not as bad after she was taken off of anastrozole by her rheumatologist.  She started letrozole and is tolerating so far.  Received iron infusions.  Reports normal appetite no weight loss.  Reports night sweats occasionally that have decreased and being followed by Dr. Burris clinic.    She notes improvement in her joint pain since being taken off of anastrozole.   The pain she experienced left lower kidney region has improved.  Renal ultrasound 2/6/2024 normal.  An MRI of liver 2/6/2024 shows likely hemangioma and does not note any lymphadenopathy or suspicious masses.  She was ordered a PET scan by her rheumatologist though she reports advised by the breast cancer clinic that she should avoid additional contrast agent testing.  At this time a PET scan does not seem necessary.     At last visit new possibly infected biopsy on her left breast following dermatology visit.  Has resolved.  Patient denies cough, visual or hearing changes, oral sores or lesions of the skin, shortness of breath, problem swallowing, pain in the chest, current urinary issues, diarrhea, constipation, unusual vaginal discharge or dryness, any new lymphadenopathy or changes in her chest/breast area.  She reports her breast cancer was discovered during a routine mammogram screening.  She is tolerating her new medication letrozole with less fatigue.     Medical history:  -Edema in lower extremities  -Irritable bowel syndrome  -Multiple sclerosis  -Right side breast cancer hormone positive on anastrozole  -Perimenopausal  -Hematuria  -Partial meniscectomy  -Umbilical hernia repair  -Elevated ferritin after iron infusions.     Social history:  -She lives with  and has 3 healthy children and multiple grandchildren  -Homemaker  -She never smoked  -She does not drink other than wine occasionally marijuana Gummies for pain as needed recently  -No illicit drug use     Family history:  -Maternal grandmother stomach and liver cancer  -Paternal grandmother: Breast cancer  -Paternal uncle colon cancer  -No other known hematologic, genetic or cancer diagnosis in first-degree relatives     Review of Systems:   A review of systems has been completed and are negative for complaints except what is stated in the assessment, HPI, IH, and/or past medical history.           Allergies and Intolerances:       Allergies:  "            Allergies   Allergen Reactions    Adhesive Tape-Silicones Itching    Latex Hives    Meperidine Other       GI upset    Morphine Other       GI upset         Outpatient Medication Profile:             Current Outpatient Medications   Medication Instructions    anastrozole (Arimidex) 1 mg tablet 1 tablet, oral, Daily    furosemide (Lasix) 20 mg tablet 1 tablet, oral, Daily PRN    hyoscyamine (ANASPAZ, LEVSIN) 0.125 mg, oral, Every 6 hours PRN    venlafaxine (Effexor) 37.5 mg tablet 1 tablet, oral, Daily         Performance:   ECOG Performance Status: 0         Vitals and Measurements:       1/12/2024    11:46 AM 1/15/2024     1:56 PM 1/18/2024    10:02 AM 2/8/2024     1:39 PM 3/15/2024     2:11 PM 4/10/2024     2:56 PM 5/16/2024     1:42 PM   Vitals   Systolic 141  137 138 110 149 133   Diastolic 85  81 87 70 69 82   Heart Rate 91 105 83 71 78 74 74   Temp  36.1 °C (97 °F) 36 °C (96.8 °F) 36.4 °C (97.5 °F) 35.3 °C (95.5 °F) 36.4 °C (97.5 °F) 36.3 °C (97.3 °F)   Resp   18 16 12  16   Height (in)  1.651 m (5' 5\")   1.651 m (5' 5\") 1.651 m (5' 5\")    Weight (lb) 293 290.2 293.87 293.21 291.4 284.8 287.37   BMI 48.76 kg/m2 48.29 kg/m2 48.9 kg/m2 48.79 kg/m2 48.49 kg/m2 47.39 kg/m2 47.82 kg/m2   BSA (m2) 2.47 m2 2.46 m2 2.47 m2 2.47 m2 2.46 m2 2.43 m2 2.44 m2   Visit Report Report Report Report Report Report Report Report      Physical Exam:      Constitutional: Patient is awake/alert/oriented  x3, no distress, Nourished, hydrated, alert and cooperative, she is overweight   Eyes: PERRL, EOMI, clear sclera   ENMT: mucous membranes moist, no oral lesions    Head/Neck: Neck supple, no apparent injury, thyroid  without mass or tenderness, No JVD, trachea midline, no bruits   Respiratory/Thorax: Patent airways, CTAB, chest symmetry, normal inspiratory and expiratory effort    Cardiovascular: Regular, rate and rhythm, normal S1 and S2, no carotid bruit   Gastrointestinal: Non-distended, soft, right upper quad " "tenderness with palpation, no masses or organomegaly appreciated   Genitourinary: deferred   Musculoskeletal: Pain in lumbar spine with palpation, lower right hip discomfort with palpation, normal strength for baseline    Extremities: Mild nonpitting ankle edema bilateral, normal strength, good circulation pulses   Neurological: alert and oriented x3, intact senses,  motor, response and reflexes, normal strength, cranial nerves normal   Breast: Left breast, scar, lesion healed following biopsy.   Lymphatic: No significant lymphadenopathy   Psychological: Appropriate mood and behavior   Skin: dry, no lesions, no rashes         Lab Results:    Lab Results   Component Value Date    WBC 7.1 05/08/2024    NEUTROABS 3.47 05/08/2024    IGABSOL 0.02 05/08/2024    LYMPHSABS 2.85 05/08/2024    MONOSABS 0.41 05/08/2024    EOSABS 0.29 05/08/2024    BASOSABS 0.03 05/08/2024    RBC 4.78 05/08/2024    MCV 85 05/08/2024    MCHC 31.1 (L) 05/08/2024    HGB 12.6 05/08/2024    HCT 40.5 05/08/2024     05/08/2024     Lab Results   Component Value Date    RETICCTPCT 1.7 02/01/2024      Lab Results   Component Value Date    CREATININE 0.69 05/08/2024    BUN 14 05/08/2024    EGFR >90 05/08/2024     05/08/2024    K 4.4 05/08/2024     05/08/2024    CO2 27 05/08/2024      Lab Results   Component Value Date    ALT 12 05/08/2024    AST 14 05/08/2024    ALKPHOS 77 05/08/2024    BILITOT 0.4 05/08/2024      Lab Results   Component Value Date    TSH 0.48 08/01/2022     Lab Results   Component Value Date    TSH 0.48 08/01/2022     Lab Results   Component Value Date    IRON 42 05/08/2024    TIBC 284 05/08/2024    FERRITIN 476 (H) 05/08/2024      Lab Results   Component Value Date    QRPQTLDN17 1,023 (H) 05/08/2024      No results found for: \"FOLATE\"  Lab Results   Component Value Date    WARD Negative 10/26/2023    RF <10 02/01/2024    SEDRATE 75 (H) 02/01/2024      Lab Results   Component Value Date    CRP 4.75 (H) 04/10/2024    " "  No results found for: \"YAHAIRA\"  Lab Results   Component Value Date     05/08/2024     Lab Results   Component Value Date    HAPTOGLOBIN 255 (H) 02/01/2024     Lab Results   Component Value Date    SPEP Normal. 10/26/2023     Lab Results   Component Value Date    IGG 1,450 10/26/2023     10/26/2023     (H) 10/26/2023         Assessment and Plan:   #1.  Elevated D-dimer none DVT  Ms. Avery Huitron is a pleasant 63-year-old female that presents for follow-up from being in the emergency room with an elevated D-dimer.  She had previously seen for iron deficiency and was infused x 2 Feraheme in November 2023. Tolerated well and with no side effects.  We will repeat these labs in 2 weeks as well as a D-dimer.  I have scheduled more iron for 3 months as needed with clinic appointment.  She has no symptoms today and actually improves overall feeling better.   5/16/2024: Last D-dimer in January 2024 was 797, 9 VTE quantitative.  We will reassess this at next visit.    #2.  Iron deficiency anemia  At initial visit hemoglobin 10.8, ferritin 108, iron TSAT 10%, ESR and CRP elevated.  MCV 81, previously microcytic.  GFR above 90.  Retic percent calculated 1.4.  Infusion received 10/31 and 11/07.  Discussed the potential causes of iron deficiency anemia.  Her inflammatory markers are elevated.  Full anemia work-up is negative for concerning markers.  Hemoglobin today is 11.6 remained stable.  Her SPEP is normal, kappa light chains elevated likely inflammatory, immunoglobulins IgA slightly elevated at 409, WARD negative.   Due to patient's complaint of bone pain in lower back, skeletal survey ordered and negative for lytic or concerning myeloma lesions. Notable degenerative changes. Referral to Rheumatology for elevated ESR, CRP and musculoskeletal pain.  Rheumatology ordered a PET/CT.  She has not had the procedure done due to being advised by breast cancer clinic that she has had a lot of imaging and should " hold off on contrast.  I agree with this.  PET/CT is not warranted at this time.  We will continue to monitor immunoglobulins and inflammatory markers along with anemia labs.  5/16/2024: She had previous iron deficiency anemia that is now corrected.  Has elevated ferritin.  We are monitoring her levels.  Reassess at next visit.  There are no other abnormalities on her labs.     #3.  Right lower back pain radiating to front at times  Previous ultrasound of abdomen revealed lesion on liver likely hemangioma.  She does mention that this pain radiates around her back to her front.  Therefore, ultrasound of kidneys has been ordered.  MRI of liver.  Possible causes, urinary tract infection versus kidney stone versus musculoskeletal discomfort.  Ordered urinalysis with reflex to microscopic.  Negative for urinary tract infection.  Renal ultrasound and MRI of liver completed.  Renal ultrasound unremarkable and MRI shows hemangioma.  There were no other factors identified of concern for malignancy.  She does note that this pain has decreased over time.  5/16/2024: Patient does not have any complaints of this today.    #4.  Infected biopsy on breast   During assessment patient questioned the biopsy location.  She states the skin has been bleeding and draining.  Upon assessment there is a circular lesion on the left lateral breast likely from a punch biopsy.  Slightly infected versus delayed healing process.  Advised to follow with dermatology.  Advised over-the-counter bacitracin as needed.  5/16/2024: This has resolved following biopsy.  Skin is healed.    #5.  Dizziness, headaches and lightheadedness  Patient has slightly reported chronic headaches, today she reports lightheadedness and dizziness positional and at rest.  She notes that it feels as if there is an electrical impulse disconnect from her body to her brain.  She denies any allergies or sinus issues.  No tinnitus or ear problems.  I have placed a referral to  neurology.    Follow-up:     RTC:  -6 months with labs      Medications:  -N/A    Imaging:  -N/A      Referrals:  --Ophthalmology 9/7/2024  --Sleep medicine 1/17/2025  --Dermatology 1/29/2025          Patient Instructions summary:  Discussed plan of care.  Patient states understanding and agreement.  Answered all her questions.  She is in agreement for referral.  She will call with any questions or concerns.    Thank you for allowing me to care for you today.     Sincerely,  Aarti Mcdonald, APRN-CNP     This note has been written with voice recognition software if there is need for clarity please reach out.

## 2024-05-17 DIAGNOSIS — M17.10 PRIMARY OSTEOARTHRITIS OF KNEE, UNSPECIFIED LATERALITY: Primary | ICD-10-CM

## 2024-05-18 DIAGNOSIS — R51.9 ACUTE INTRACTABLE HEADACHE, UNSPECIFIED HEADACHE TYPE: ICD-10-CM

## 2024-05-18 DIAGNOSIS — R42 LIGHTHEADEDNESS: Primary | ICD-10-CM

## 2024-05-23 ENCOUNTER — TELEPHONE (OUTPATIENT)
Dept: PRIMARY CARE | Facility: CLINIC | Age: 64
End: 2024-05-23

## 2024-05-23 ENCOUNTER — HOSPITAL ENCOUNTER (OUTPATIENT)
Dept: RADIOLOGY | Facility: EXTERNAL LOCATION | Age: 64
Discharge: HOME | End: 2024-05-23

## 2024-05-23 ENCOUNTER — OFFICE VISIT (OUTPATIENT)
Dept: ORTHOPEDIC SURGERY | Facility: CLINIC | Age: 64
End: 2024-05-23
Payer: COMMERCIAL

## 2024-05-23 DIAGNOSIS — M17.11 ARTHRITIS OF RIGHT KNEE: Primary | ICD-10-CM

## 2024-05-23 DIAGNOSIS — R73.03 PREDIABETES: Primary | ICD-10-CM

## 2024-05-23 DIAGNOSIS — M17.10 PRIMARY OSTEOARTHRITIS OF KNEE, UNSPECIFIED LATERALITY: ICD-10-CM

## 2024-05-23 PROCEDURE — 20611 DRAIN/INJ JOINT/BURSA W/US: CPT | Performed by: STUDENT IN AN ORGANIZED HEALTH CARE EDUCATION/TRAINING PROGRAM

## 2024-05-23 PROCEDURE — 3008F BODY MASS INDEX DOCD: CPT | Performed by: STUDENT IN AN ORGANIZED HEALTH CARE EDUCATION/TRAINING PROGRAM

## 2024-05-23 PROCEDURE — RXMED WILLOW AMBULATORY MEDICATION CHARGE

## 2024-05-23 PROCEDURE — 99204 OFFICE O/P NEW MOD 45 MIN: CPT | Performed by: STUDENT IN AN ORGANIZED HEALTH CARE EDUCATION/TRAINING PROGRAM

## 2024-05-23 PROCEDURE — 1036F TOBACCO NON-USER: CPT | Performed by: STUDENT IN AN ORGANIZED HEALTH CARE EDUCATION/TRAINING PROGRAM

## 2024-05-23 RX ORDER — ROPIVACAINE HYDROCHLORIDE 5 MG/ML
3 INJECTION, SOLUTION EPIDURAL; INFILTRATION; PERINEURAL
Status: COMPLETED | OUTPATIENT
Start: 2024-05-23 | End: 2024-05-23

## 2024-05-23 RX ORDER — DULAGLUTIDE 1.5 MG/.5ML
1.5 INJECTION, SOLUTION SUBCUTANEOUS
Qty: 2 ML | Refills: 2 | Status: SHIPPED | OUTPATIENT
Start: 2024-05-26 | End: 2024-05-23 | Stop reason: SDUPTHER

## 2024-05-23 RX ORDER — METHYLPREDNISOLONE ACETATE 40 MG/ML
40 INJECTION, SUSPENSION INTRA-ARTICULAR; INTRALESIONAL; INTRAMUSCULAR; SOFT TISSUE
Status: COMPLETED | OUTPATIENT
Start: 2024-05-23 | End: 2024-05-23

## 2024-05-23 RX ORDER — DULAGLUTIDE 1.5 MG/.5ML
1.5 INJECTION, SOLUTION SUBCUTANEOUS
Qty: 2 ML | Refills: 2 | Status: SHIPPED | OUTPATIENT
Start: 2024-05-26

## 2024-05-23 RX ADMIN — METHYLPREDNISOLONE ACETATE 40 MG: 40 INJECTION, SUSPENSION INTRA-ARTICULAR; INTRALESIONAL; INTRAMUSCULAR; SOFT TISSUE at 17:06

## 2024-05-23 RX ADMIN — ROPIVACAINE HYDROCHLORIDE 3 ML: 5 INJECTION, SOLUTION EPIDURAL; INFILTRATION; PERINEURAL at 17:06

## 2024-05-23 ASSESSMENT — PAIN - FUNCTIONAL ASSESSMENT: PAIN_FUNCTIONAL_ASSESSMENT: 0-10

## 2024-05-23 ASSESSMENT — PAIN DESCRIPTION - DESCRIPTORS: DESCRIPTORS: SHARP

## 2024-05-23 NOTE — PROGRESS NOTES
REFERRAL SOURCE: Diego Khan MD     CHIEF COMPLAINT: right knee pain    HISTORY OF PRESENT ILLNESS  Calvin Monroe is a very pleasant 64 y.o. female with history of breast cancer and obesity with BMI 47 who is here for evaluation of right knee pain.     5/23/24: She complains of right anterior knee pain that is achy and sharp with movement.  Pain can be severe. It does not radiate. This started in December 2023 without injury.  She was previously taking medication for her breast cancer that was causing pain in all of her joints and she stopped it on December 22 at which time she was left with just right knee pain.  Pain is worse with activity and improves with rest.  She has tried Tylenol and voltaren gel that help a little.  She has not done any physical therapy.  She has never had injections.  She has been working on weight loss and has changed her diet and is on Trulicity.    MEDS    Current Outpatient Medications:     diclofenac sodium (Voltaren) 1 % gel, Apply 4.5 inches (4 g) topically 4 times a day., Disp: 100 g, Rfl: 3    dulaglutide (Trulicity) 0.75 mg/0.5 mL pen injector, Inject 0.75 mg under the skin 1 (one) time per week., Disp: 2 mL, Rfl: 1    [START ON 5/26/2024] dulaglutide (Trulicity) 1.5 mg/0.5 mL pen injector injection, Inject 1.5 mg under the skin 1 (one) time per week., Disp: 2 mL, Rfl: 2    letrozole (Femara) 2.5 mg tablet, Take 1 tablet (2.5 mg total) by mouth once daily.  Take with or without food., Disp: 90 tablet, Rfl: 3    venlafaxine (Effexor) 37.5 mg tablet, Take 1 tablet (37.5 mg) by mouth once daily., Disp: 90 tablet, Rfl: 1    ALLERGIES  Allergies   Allergen Reactions    Adhesive Tape-Silicones Itching    Latex Hives    Meperidine Other     GI upset    Morphine Other     GI upset       PAST MEDICAL HISTORY  Past Medical History:   Diagnosis Date    Anxiety disorder, unspecified     Anxiety    Bilious vomiting 04/26/2016    Bilious vomiting with nausea    Breast CA  (Multi)     Headache, unspecified 02/03/2017    Headache    Irritable bowel syndrome with diarrhea 04/07/2016    Irritable bowel syndrome with diarrhea    Mammographic calcification found on diagnostic imaging of breast 06/01/2015    Breast calcification, right    ARAIN (obstructive sleep apnea)     Overactive bladder     OAB (overactive bladder)    Personal history of diseases of the blood and blood-forming organs and certain disorders involving the immune mechanism     History of anemia    Personal history of diseases of the skin and subcutaneous tissue 07/27/2018    History of cyst of breast    Personal history of other diseases of the female genital tract 08/01/2017    History of breast pain    Personal history of other mental and behavioral disorders     History of depression    Personal history of other specified conditions     History of lump of left breast       PAST SURGICAL HISTORY  Past Surgical History:   Procedure Laterality Date    BREAST LUMPECTOMY Right 08/2021    HERNIA REPAIR  10/17/2014    Hernia Repair    MR HEAD ANGIO WO IV CONTRAST  06/12/2021    MR HEAD ANGIO WO IV CONTRAST 6/12/2021 [facility]    MR NECK ANGIO WO IV CONTRAST  06/12/2021    MR NECK ANGIO WO IV CONTRAST 6/12/2021 [facility]    OTHER SURGICAL HISTORY  10/17/2014    Laparoscopic Excision Of Ectopic Pregnancy And Salpingectomy    OTHER SURGICAL HISTORY  08/03/2021    Knee arthroscopy       SOCIAL HISTORY   Social History     Socioeconomic History    Marital status:      Spouse name: Not on file    Number of children: Not on file    Years of education: Not on file    Highest education level: Not on file   Occupational History    Not on file   Tobacco Use    Smoking status: Never    Smokeless tobacco: Never   Vaping Use    Vaping status: Never Used   Substance and Sexual Activity    Alcohol use: Yes     Comment: occasional glass of wine    Drug use: Never    Sexual activity: Defer   Other Topics Concern     Not on file   Social History Narrative    Not on file     Social Determinants of Health     Financial Resource Strain: Not on file   Food Insecurity: Not on file   Transportation Needs: Not on file   Physical Activity: Not on file   Stress: Not on file   Social Connections: Not on file   Intimate Partner Violence: Not on file   Housing Stability: Not on file       FAMILY HISTORY  Family History   Problem Relation Name Age of Onset    Other (murder) Mother      Alzheimer's disease Father      Diabetes Father      Lung cancer Father      Asthma Brother      Stomach cancer Maternal Grandmother      Breast cancer Paternal Grandmother      Other (cardiac disorder) Other      Stroke Other      Ovarian cancer Other      Breast cancer Other         REVIEW OF SYSTEMS  Except for those mentioned in the history of present illness, and below, a complete 14 point review of systems is negative.     Review of Systems    VITALS  There were no vitals filed for this visit.    PHYSICAL EXAMINATION   GENERAL:  Awake, alert, and oriented, no apparent distress, pleasant, and cooperative  PSYC: Mood is euthymic, affect is congruent  EAR, NOSE, THROAT:  Normocephalic, atraumatic, moist membranes, anicteric sclera  LUNG: Nonlabored breathing  HEART: No clubbing or cyanosis  SKIN: No increased erythema, warmth, rashes, or concerning skin lesions  NEURO: Sensation is intact in the bilateral lower extremities. Strength is grossly 5 out of 5 throughout the bilateral lower extremities, unless noted below.  GAIT: Non-antalgic  MUSCULOSKELETAL: Examination of the right knee: Range of motion is 0-100 and limited by body habitus. No effusion. No patellar apprehension. No tenderness to palpation of the medial or lateral joint lines, extensor mechanism or patellar facets. Varus and valgus stress test are negative. Lachman's negative. Posterior drawer is negative. Shanel's and meniscal grind tests are positive.    IMAGING STUDIES:   Radiographs the  right knee dated 4/10/2024 were personally reviewed and interpreted by me, Dr. Debbi Hirsch, and the findings shared with the patient.  There is moderate to severe osteoarthritis of the bilateral knees.     IMPRESSION  #1  Acute exacerbation of chronic right knee osteoarthritis  #2 Obesity, BMI 47    PLAN  The following was discussed with the patient:     Calvin Varela- is a very pleasant 64 y.o. female with history of breast cancer who is here for evaluation of right knee pain due to acute exacerbation of chronic right knee osteoarthritis.  -The diagnosis of knee arthritis was discussed in detail. The only cure for arthritis is a knee replacement. Without curing arthritis, we can improve the pain that the patient experiences and hopefully allow them to continue to do the activities that they enjoy.  -Physical therapy: Work on hip abductor, knee extensor, core strengthening and flexibility with PT. The patient was given a referral to physical therapy today.  -Weight loss and physical activity: The benefits of weight loss and physical activity on improving pain experienced with arthritis were discussed.  She is working on weight loss on Trulicity.  She was encouraged to continue with these efforts.  -Bracing: Knee bracing can be considered, though this may be limited by body habitus.  -Medications: Continue to take Tylenol and use Voltaren gel over the counter.   -Injections: Injections, such as corticosteroid, viscosupplementation, and PRP can be utilized. The pros, cons, risks, benefits, pre-procedure and post-procedure protocols were discussed.  She wished to proceed with an ultrasound-guided right knee joint corticosteroid injection.  Please see procedure note section for complete details.  -Follow-up in 3 months, or sooner if needed.    Note sent to: Diego Khan MD     The patient was counseled to remain active, but avoid activities that worsen symptoms. The patient was in agreement with this  plan. All questions were answered to the best of my ability.    PATIENT EDUCATION:  Education was discussed at today's appointment. A learning needs assessment was performed.    Primary learner: Calvin Monroe  Barriers to learning: None  Preferred language: English  Learning preferences include: Seeing and doing.  Discussed: Diagnosis and treatment plan.  Demonstrated: Understanding of material discussed.  Patient education materials given: None.  Learner response: Learner demonstrated understanding.    This note was dictated using Dragon speech recognition software and was not corrected for spelling or grammatical errors.    Large Jt Asp/Inj: R knee on 5/23/2024 5:06 PM  Details: ultrasound-guided  Medications: 40 mg methylPREDNISolone acetate 40 mg/mL; 3 mL ropivacaine 5 mg/mL (0.5 %)    Pre-Procedure Diagnosis: right knee pain, right knee osteoarthritis  Post-Procedure Diagnosis: right knee pain, right knee osteoarthritis    Procedure: US-guided right knee corticosteroid injection    History Present Illness: Calvin Monroe is a pleasant 64 y.o. female with knee pain secondary to osteoarthritis who is here for the above procedure for improved pain control.     Medications and allergies were reviewed with the patient. No contraindications were identified.      Informed Consent:   Following denial of allergy and review of potential side effects and complications including but not necessarily limited to infection, allergic reaction, local tissue breakdown, systemic effects of corticosteroids, elevation of blood glucose, injury to soft tissue and/or nerves and seizure, the patient indicated understanding and agreed to proceed. Written consent to treatment was obtained and the patient verbalized consent for the procedure.    Procedural Details  The use of direct ultrasound visualization of the needle (rather than a non-guided injection) was required to increase patient safety by excluding inadvertent  intramuscular or intratendinous placement and minimizing bleeding by avoiding osteochondral or vascular injury from the needle.  Additionally, the increased accuracy of placement may increase clinical effectiveness and will allow higher diagnostic specificity when evaluating effectiveness of this injection.    Using ultrasound, a pre-scan of the region was performed to identify the target structure.     The area was prepped with chlorhexidine, then re-examined using the same transducer, a sterile ultrasound transducer cover, and sterile ultrasound gel.    Procedural pause conducted to verify:  correct patient identity, procedure to be performed, and as applicable, correct side and site, correct patient position, and availability of implants, special equipment, or special requirements    Procedure:  Transducer: Linear array transducer.  Patient position: Supine with the knee bent to 30 degrees.  Localization process: The suprapatellar recess was localized in a short axis view.  Local anesthesia: No local anesthesia was used.  Needle: A 27-gauge, 1.5-inch needle was used for the injectate.  Approach: A lateral to medial, in plane, approach was used to guide the needle tip into the suprapatellar recess deep to the quadriceps tendon and superficial to the prefemoral fat pad.   Injection/Aspiration: A mixture of 3 cc of 0.5% ropivocaine and 1 cc of DepoMedrol (40mg/mL) was injected into the right knee without complication.    Post-procedural care: The patient tolerated the procedure well and reported complete pain relief during the anesthetic phase. The patient was asked to ice for improved pain control and avoid submerging the area in water for the next 48 hours to help reduce the risk of infection. The patient was instructed to call the office immediately if there are any questions or concerns.     PATIENT EDUCATION:  Education of the diagnosis and treatment plan was discussed at today's appointment. A learning needs  assessment was performed. The patient demonstrated understanding.

## 2024-05-23 NOTE — TELEPHONE ENCOUNTER
Patient last seen 03/15/24  Upcoming appt 06/07/24  States was told at he last appt her dosage for Trulicity should be increased gradually.  Requests higher dosage for the next month  Please advise  TY

## 2024-06-07 ENCOUNTER — OFFICE VISIT (OUTPATIENT)
Dept: PRIMARY CARE | Facility: CLINIC | Age: 64
End: 2024-06-07
Payer: COMMERCIAL

## 2024-06-07 ENCOUNTER — PHARMACY VISIT (OUTPATIENT)
Dept: PHARMACY | Facility: CLINIC | Age: 64
End: 2024-06-07
Payer: COMMERCIAL

## 2024-06-07 VITALS
SYSTOLIC BLOOD PRESSURE: 122 MMHG | HEIGHT: 65 IN | HEART RATE: 74 BPM | DIASTOLIC BLOOD PRESSURE: 80 MMHG | OXYGEN SATURATION: 94 % | BODY MASS INDEX: 47.23 KG/M2 | WEIGHT: 283.5 LBS | TEMPERATURE: 97.8 F | RESPIRATION RATE: 12 BRPM

## 2024-06-07 DIAGNOSIS — R73.03 PREDIABETES: Primary | ICD-10-CM

## 2024-06-07 PROCEDURE — 3008F BODY MASS INDEX DOCD: CPT | Performed by: STUDENT IN AN ORGANIZED HEALTH CARE EDUCATION/TRAINING PROGRAM

## 2024-06-07 PROCEDURE — 99213 OFFICE O/P EST LOW 20 MIN: CPT | Performed by: STUDENT IN AN ORGANIZED HEALTH CARE EDUCATION/TRAINING PROGRAM

## 2024-06-07 PROCEDURE — 1036F TOBACCO NON-USER: CPT | Performed by: STUDENT IN AN ORGANIZED HEALTH CARE EDUCATION/TRAINING PROGRAM

## 2024-06-07 ASSESSMENT — PATIENT HEALTH QUESTIONNAIRE - PHQ9
2. FEELING DOWN, DEPRESSED OR HOPELESS: NOT AT ALL
1. LITTLE INTEREST OR PLEASURE IN DOING THINGS: NOT AT ALL
SUM OF ALL RESPONSES TO PHQ9 QUESTIONS 1 AND 2: 0

## 2024-06-07 NOTE — LETTER
JUNE, 7, 2024     Patient: Calvin Monroe   YOB: 1960   Date of Visit: 6/7/2024       To Whom It May Concern:    Calvin Monroe was seen in my clinic on 6/7/2024 at 1:50 pm. Please excuse Calvin for her absence from work on this day to make the appointment.    If you have any questions or concerns, please don't hesitate to call.         Sincerely,         Antonella Lagunas MD        CC: No Recipients

## 2024-06-07 NOTE — PROGRESS NOTES
"Subjective   Patient ID: Calvin Monroe \"Ashlyn" is a pleasant 64 y.o. female who presents for Follow-up (Follow up- medication check, prediabetes).  HPI  Patient is here to follow-up on prediabetes.  Her last hemoglobin A1c was 6% which had improved down to 6.3%.  She has been started on Trulicity 0.75 mg weekly which she has been tolerating well without any significant side effects.  She will be increasing the dose of the Trulicity to 1.5 mg weekly.  She has an upcoming appointment with the weight loss program in July.  She has had some weight loss since her last visit.  She is becoming more physically active as her knee pain is better controlled.  She had a follow-up with orthopedics for her knee pain    Review of Systems   All other systems reviewed and are negative.      Visit Vitals  /80 (Patient Position: Sitting)   Pulse 74   Temp 36.6 °C (97.8 °F)   Resp 12      Vitals:    06/07/24 1344   Weight: 129 kg (283 lb 8 oz)        Objective   Physical Exam  Constitutional:       General: She is not in acute distress.     Appearance: Normal appearance.   HENT:      Head: Normocephalic and atraumatic.   Eyes:      General: No scleral icterus.     Conjunctiva/sclera: Conjunctivae normal.   Cardiovascular:      Rate and Rhythm: Normal rate and regular rhythm.      Heart sounds: Normal heart sounds.   Pulmonary:      Effort: Pulmonary effort is normal.      Breath sounds: Normal breath sounds. No wheezing.   Abdominal:      Palpations: Abdomen is soft.   Musculoskeletal:      Cervical back: Neck supple.      Right lower leg: No edema.      Left lower leg: No edema.   Skin:     General: Skin is warm and dry.   Neurological:      General: No focal deficit present.      Mental Status: She is alert and oriented to person, place, and time.   Psychiatric:         Mood and Affect: Mood normal.         Behavior: Behavior normal.         Assessment/Plan   Problem List Items Addressed This Visit       " Prediabetes - Primary     Continue with Trulicity 1.5 mg.           BMI 45.0-49.9, adult (Multi)     Upcoming appointment with [facility] in July.                   This note was dictated using voice recognition software and not corrected for grammatical or spelling errors.

## 2024-06-14 DIAGNOSIS — T45.1X5A HOT FLASHES RELATED TO AROMATASE INHIBITOR THERAPY: Primary | ICD-10-CM

## 2024-06-14 DIAGNOSIS — R23.2 HOT FLASHES RELATED TO AROMATASE INHIBITOR THERAPY: Primary | ICD-10-CM

## 2024-06-14 RX ORDER — VENLAFAXINE HYDROCHLORIDE 37.5 MG/1
37.5 CAPSULE, EXTENDED RELEASE ORAL DAILY
Qty: 90 CAPSULE | Refills: 1 | Status: SHIPPED | OUTPATIENT
Start: 2024-06-14

## 2024-06-26 NOTE — PROGRESS NOTES
"Physical Therapy    Physical Therapy Evaluation and Treatment      Patient Name: Calvin Monroe \"Ashlyn"  MRN: [id number]  Today's Date: 6/27/24  Visit 1  Time Calculation  Start Time: 0710  Stop Time: 0750  Time Calculation (min): 40 min  PT Evaluation Time Entry  PT Evaluation (Low) Time Entry: 25    PT Therapeutic Procedures Time Entry  Therapeutic Exercise Time Entry: 15       Assessment:    Patient presents with clinical signs and symptoms bilat knee pain due to degenerative joint disease. She is a candidate for TKA but needs to lose weight for the procedure.  These impairments affect ADLs, work, recreational activity, exercise, transfer ability, ambulation, and sleep function that requires skilled PT intervention to resolve and enable patient to return to previous level of function. Factors that may affect progress in PT are chronic pain, obesity, medical co-morbidities, depression, and patient compliance.  Patient response to initial treatment of education and initial exercise program was understood by Fouzia WOLFE AveryStefanie    was      Plan:  OP PT Plan  Treatment/Interventions: Education/ Instruction, Cryotherapy, Hot pack, Manual therapy, Neuromuscular re-education, Self care/ home management, Taping techniques, Therapeutic activities, Therapeutic exercises  PT Plan: Skilled PT  PT Frequency: 1 time per week  Duration: 8  Onset Date: 01/01/22  Certification Period Start Date: 06/27/24  Certification Period End Date: 09/25/24  Rehab Potential: Fair  Plan of Care Agreement: Patient    Current Problem:   1. Chronic pain of right knee        2. Arthritis of right knee  Referral to Physical Therapy          Subjective    Fouzia Monroe works full time as . She has had bilat knee pain for years. She is a TKA candidate. She was treated for cancer ~ 1 year ago with radiation. Worse up steps , can't get down into bath tub. she had a a steroid injection R knee 1 month ago helped a little. "   H/o L arthroscopic menisectomy 2017*Allergic to latex*  Pain:   R> L knee pain 5/10  Home Living:   Lives with spouse and grand kids in split level home  Prior Level of Function:  Prior Function Per Pt/Caregiver Report  Hand Dominance: RightPainful knees for years    Objective   Cognition:   A+Ox3  Observation:   Hip and ankle joint clearance:  clear  Posture:  genu varus bilat       Single leg stance:  Eyes open  R  sec   L  sec unable    Knee ROM: Flexion  AROM  R 96 deg  L  101 deg  , Extension  AROM  R  L  deg PROM:  R  L deg,   PROM      Knee Strength:  Flex  R 4/5   L 4/5,  Ext R 4 /5  L 4/5     Hip Strength:  Abduction  R 3+ /5  L  3+/5           Extension  R /5    L  /5         Flexion R  4/5   L 4 /5 both knee P    Accessory joint mobility: patella      hypomobile   hypermobile    Palpation: tenderness to medial/ lateral joint line      Trigger points:  VMO   rectus femoris  VL    gatroc  soleus   glut med   glut max   ITB  Gait: antalgic  lateral sway         Special Tests:   SLR quad lag    no           Outcome Measures:  LEFS 29/80    Treatments:  There ex:  Quad sets hold 5 sec x 2x10  Pelvic bridge 2x10   Recumbent bike 6 min at 50 RPM     EDUCATION:   extensive education regarding mechanism of injury, relevant functional anatomy, treatment program rational, self management, HEP, and POC    Access Code: [email or web address]  URL: [email or web address]  Date: 06/27/2024  Prepared by: Mehdi Aguillon    Exercises  - Supine Quad Set  - 2 x daily - 7 x weekly - 2 sets - 10 reps  - Supine Bridge with Pelvic Floor Contraction  - 2 x daily - 7 x weekly - 2 sets - 10 reps  Goals:  1. Decrease pain to 1-3/10 severity level   2. Increase R  knee flexion AROM to   100  deg   3. Increase bilat knee n strength to  5 /5 MMT grade  4. Improve outcome score by 15 points  5. independent Home exercise program

## 2024-06-27 ENCOUNTER — EVALUATION (OUTPATIENT)
Dept: PHYSICAL THERAPY | Facility: CLINIC | Age: 64
End: 2024-06-27
Payer: COMMERCIAL

## 2024-06-27 DIAGNOSIS — M25.561 CHRONIC PAIN OF RIGHT KNEE: Primary | ICD-10-CM

## 2024-06-27 DIAGNOSIS — G89.29 CHRONIC PAIN OF RIGHT KNEE: Primary | ICD-10-CM

## 2024-06-27 DIAGNOSIS — M17.11 ARTHRITIS OF RIGHT KNEE: ICD-10-CM

## 2024-06-27 PROCEDURE — 97110 THERAPEUTIC EXERCISES: CPT | Mod: GP | Performed by: PHYSICAL THERAPIST

## 2024-06-27 PROCEDURE — 97161 PT EVAL LOW COMPLEX 20 MIN: CPT | Mod: GP | Performed by: PHYSICAL THERAPIST

## 2024-06-27 ASSESSMENT — ENCOUNTER SYMPTOMS
OCCASIONAL FEELINGS OF UNSTEADINESS: 1
DEPRESSION: 0
LOSS OF SENSATION IN FEET: 0

## 2024-07-01 NOTE — PROGRESS NOTES
"AUDIOLOGY ADULT AUDIOMETRIC EVALUATION      Name:  Calvin Monroe \"Fouzia\"   :  1960  Age:  64 y.o.  Date of Evaluation:  2024    Time: 9151-3254    IMPRESSIONS     Normal to mild sensorineural hearing loss in both ears from 125-2000 Hz slightly rising through 8000 Hz  Word understanding in quiet is excellent bilaterally.  Tympanometry indicates normal middle ear pressure and tympanic membrane mobility in both ears.     Amplification needs: Amplification is not recommended at this time.    RECOMMENDATIONS     Continue medical follow up with primary care provider and/or Ears Nose and Throat (ENT) provider as recommended.  Return for audiologic evaluation annually to monitor hearing sensitivity and assess middle ear status or sooner should concerns arise. The audiology department can be reached at [phone] to schedule an appointment.     HISTORY     Reason for visit:  Ms. Monroe is seen today for an initial audiologic evaluation in conjunction with an evaluation with BENEDICTO Smiley. History obtained from patient report and chart review.     Tinnitus: denied   Otalgia: denied  Otorrhea: denied  Dizziness: Lightheadedness that comes and goes, sometimes she reports waking up dizzy in the morning. No known triggers.  History of Ear Surgeries: denied    EVALUATION     See scanned audiogram in \"Media\"     TEST RESULTS     Otoscopic Evaluation:  Right Ear: Ear canal clear.  Left Ear: Ear canal clear.    Tympanometry (226 Hz):  Right Ear: Type A, middle ear pressure and tympanic membrane compliance within normal limits.   Left Ear: Type A, middle ear pressure and tympanic membrane compliance within normal limits.     Acoustic Reflexes:   Right Ear: Responses present at 500-4000 Hz.  Left Ear: Responses present at 500-4000 Hz.    Distortion Product Otoacoustic Emissions:  Right Ear: Did not test.  Left Ear:  Did not test.  Present OAEs suggest normal or near cochlear outer hair " cell function for corresponding frequency region(s). Absent OAEs with normal middle ear function can be consistent with some degree of hearing loss. Assessment of cochlear outer hair cell function may be impacted by outer or middle ear function.    Test technique:  Pure Tone Audiometry via insert earphones  Reliability: Good    Pure Tone Audiometry:    Right Ear: Hearing levels within normal limits 125-500 Hz falling to mild sensorineural hearing loss through 8000 Hz  Left Ear: Hearing levels within normal limits 125-1000 Hz falling to mild sensorineural hearing loss at 2000 Hz recovering to within normal limits through 8000 Hz    Speech Audiometry:   Right Ear:  Speech Reception Threshold (SRT) was obtained at 20 dB HL. Word Recognition scores were excellent (100%) in quiet when words were presented at 60 dB HL. These results are based on [facility] Auditory Test No.6 (NU-6) (N=25).   Left Ear:  Speech Reception Threshold (SRT) was obtained at 15 dB HL. Word Recognition scores were excellent (92%) in quiet when words were presented at 55 dB HL. These results are based on [facility] Auditory Test No.6 (NU-6) (N=25).     Comparison of today's results with previous test results: No previous results available.         PATIENT EDUCATION     Discussed results and recommendations with Ms. Monroe. Questions were addressed and the patient was encouraged to contact our department at [phone] should concerns arise.       Estee Loza, CCC-A  Clinical Audiologist    Degree of   Hearing Sensitivity dB Range   Within Normal Limits (WNL) 0 - 20   Slight 25   Mild 26 - 40   Moderate 41 - 55   Moderately-Severe 56 - 70   Severe 71 - 90   Profound 91 +     Key   CHL Conductive Hearing Loss   ECV Ear Canal Volume   HA Hearing Aid   NIHL Noise-Induced Hearing Loss   PTA Pure Tone Average   SNHL Sensorineural Hearing Loss   TM Tympanic Membrane   WNL Within Normal Limits

## 2024-07-02 ENCOUNTER — CLINICAL SUPPORT (OUTPATIENT)
Dept: AUDIOLOGY | Facility: CLINIC | Age: 64
End: 2024-07-02
Payer: COMMERCIAL

## 2024-07-02 ENCOUNTER — APPOINTMENT (OUTPATIENT)
Dept: OTOLARYNGOLOGY | Facility: CLINIC | Age: 64
End: 2024-07-02
Payer: COMMERCIAL

## 2024-07-02 ENCOUNTER — APPOINTMENT (OUTPATIENT)
Dept: PRIMARY CARE | Facility: CLINIC | Age: 64
End: 2024-07-02
Payer: COMMERCIAL

## 2024-07-02 VITALS — TEMPERATURE: 97.6 F | BODY MASS INDEX: 46.82 KG/M2 | WEIGHT: 281 LBS | HEIGHT: 65 IN

## 2024-07-02 VITALS — HEIGHT: 65 IN | BODY MASS INDEX: 46.76 KG/M2

## 2024-07-02 DIAGNOSIS — R42 DIZZINESS: ICD-10-CM

## 2024-07-02 DIAGNOSIS — M43.6 NECK STIFFNESS: Primary | ICD-10-CM

## 2024-07-02 DIAGNOSIS — E66.01 CLASS 3 SEVERE OBESITY WITH SERIOUS COMORBIDITY AND BODY MASS INDEX (BMI) OF 45.0 TO 49.9 IN ADULT, UNSPECIFIED OBESITY TYPE (MULTI): ICD-10-CM

## 2024-07-02 DIAGNOSIS — H90.3 SENSORINEURAL HEARING LOSS (SNHL) OF BOTH EARS: Primary | ICD-10-CM

## 2024-07-02 PROCEDURE — 92557 COMPREHENSIVE HEARING TEST: CPT | Performed by: AUDIOLOGIST

## 2024-07-02 PROCEDURE — 99204 OFFICE O/P NEW MOD 45 MIN: CPT | Performed by: NURSE PRACTITIONER

## 2024-07-02 PROCEDURE — 97802 MEDICAL NUTRITION INDIV IN: CPT

## 2024-07-02 PROCEDURE — 3008F BODY MASS INDEX DOCD: CPT | Performed by: NURSE PRACTITIONER

## 2024-07-02 PROCEDURE — 92550 TYMPANOMETRY & REFLEX THRESH: CPT | Performed by: AUDIOLOGIST

## 2024-07-02 PROCEDURE — 1036F TOBACCO NON-USER: CPT | Performed by: NURSE PRACTITIONER

## 2024-07-02 PROCEDURE — RXMED WILLOW AMBULATORY MEDICATION CHARGE

## 2024-07-02 NOTE — PROGRESS NOTES
"Subjective   Patient ID: Calvin Monroe \"Fouzia\" is a 64 y.o. female who presents for Dizziness.  HPI  This patient is referred for evaluation of  episodic vertiginous dizziness. The patient is not accompanied by anyone. The approximate duration of her complaints is years.  Patient states that symptoms will be active for period of time then resolve on their own.  She has not identified any pattern to this.  The patient describes her dizziness as sudden \"flash\" of vertigo followed by significant unsteadiness.  She reports that the vertigo is short lasting but the unsteadiness typically lasts the rest of the day.  When asked about ear pain, headache, phono-photophobia, visual or motion intolerance, sound or pressure induced symptoms, hearing loss, discharge from ear, tinnitus, aural fullness or autophony, the patient admits to none.  When asked about a significant past otological history including history of prior ear surgery, noise exposure, exposure to ototoxic drugs or agents, and/or family history of hearing loss, the patient admits to none.  Patient endorses occasional neck stiffness.    Review of Systems  A comprehensive or 10 points review of the patient´s constitutional, neurological, HEENT, pulmonary, cardiovascular and genito-urinary systems showed only those mentioned in history of present illness.    Objective   Physical Exam  Constitutional: no fever, chills, weight loss or weight gain   General appearance: Appears well, well-nourished, well groomed. No acute distress.   Communication: Normal communication   Psychiatric: Oriented to person, place and time. Normal mood and affect.   Neurologic: Cranial nerves II-XII grossly intact and symmetric bilaterally.   Head and Face:   Head: Atraumatic with no masses, lesions or scarring.   Face: Normal symmetry, no paralysis, synkinesis or facial tic. No scars or deformities.   TMJ: Normal, no trismus.   Eyes: Conjunctiva not edematous or erythematous "   Ears: External inspection of ears with no deformity, scars or masses. Bilateral EACs clear and bilateral TMs intact with no signs of effusions   Nose: External inspection of nose: No nasal lesions, lacerations or scars.  Neck: Normal appearing, symmetric, trachea midline.   Cardiovascular: Examination of peripheral vascular system shows no clubbing or cyanosis.   Respiratory: No respiratory distress increased work of breathing. Inspection of the chest with symmetric chest expansion and normal respiratory effort.   Skin: No rashes in the head or neck  Bedside occulomotor function assessment for ocular pursuits and saccades, spontaneous nystagmus was normal.  Bilateral head thrust negative  Romberg normal  Fukuda normal  Tandem gait unsteady, patient leaning left and right  Pickens-Hallpike negative bilaterally  My interpretation of the audiogram done today is essentially normal hearing with excellent word recognition scores and normal tympanograms bilaterally.  Assessment/Plan        This patient presents for initial evaluation of chronic acquired dizziness and neck stiffness.    Reassurance given that otologic exam and audiogram today are both normal.  Balance testing is concerning for central process.  The physiology of balance control was explained. The likely possible etiologies were reviewed. I believe the patient does not likely have a peripheral vestibular disorder. I recommended referral to physical therapy for possible cervicogenic dizziness.  She is currently in physical therapy for her knee.  Asked that she contact my office once this is complete and I will place a new referral.  If PT does not improve symptoms, I would consider further balance function testing.  Patient is in agreement with the plan.  All questions were answered to patient's satisfaction.      45 minutes was spent on this patient´s visit. More than 50% of that time was spent in counseling regarding the possible etiologies, test results,  treatment options and coordinating care.    This note was created using speech recognition transcription software. Despite proofreading, several typographical errors might be present that might affect the meaning of the content. Please call with any questions.        ZAID Flores-CNP 07/02/24 11:25 AM

## 2024-07-02 NOTE — PROGRESS NOTES
Nutrition: Initial Assessment    Reason for Nutrition Visit: Patient is a 64 y.o. female referred for obesity, class 3. Referred on 3/15/24 by Dr. Radu Lagunas.        Nutrition Assessment    Food & Nutrition Related History: Pt presents in office. Would like to discuss wt loss. She works 2 days in the office and eats breakfast and lunch at work. When she works from home ([employer]) her diet is very unstructured. Works as an . She is eating inconsistently but the foods she does eat are high kcal/high fat.     Dietary Considerations: minimal red meat  Allergies: None  Intolerance: Lactose  Appetite: Good  Intake: >75%  GI Symptoms : None  Swallowing Difficulty: No problems with swallowing  Dentition : own  Food Preparation: Patient  Cooking Skills/Barriers: None reported  Grocery Shopping: Patient  Supplements: Denies   Food Insecurity: Denies     Dietary Recall:   Yesterday 10 AM  - bagel with cinnamon spread   Yesterday Evening - a couple bites of Chipotle   This Morning - Plain raisin bagel   Today 12 PM -  leftover Chipotle     Snacks: guac   Beverages: water, juice, lemonade   Eating out: 1-2x per week   Alcohol Intake: glass of wine    Physical Activity: No formal exercise, needs knee replacement, doing PT 1x per week     Labs:  Lab Results   Component Value Date    HGBA1C 6.0 (H) 12/07/2023    HGBA1C 6.3 (A) 08/01/2022    HGBA1C 5.9 06/13/2021     05/08/2024    K 4.4 05/08/2024     05/08/2024    CO2 27 05/08/2024    BUN 14 05/08/2024    CREATININE 0.69 05/08/2024    CALCIUM 9.8 05/08/2024    ALBUMIN 4.1 05/08/2024    PROT 7.5 05/08/2024    BILITOT 0.4 05/08/2024    ALKPHOS 77 05/08/2024    ALT 12 05/08/2024    AST 14 05/08/2024    GLUCOSE 90 05/08/2024    CHOL 172 09/12/2023    TRIG 128 09/12/2023    HDL 47.2 09/12/2023   Comments: A1c = 6.0% c/w prediabetes (12/7/23).     Vit D:   Lab Results   Component Value Date    VITD25 42 08/01/2022     Iron Panel:   Lab Results   Component Value Date  "   IRON 42 05/08/2024    TIBC 284 05/08/2024    FERRITIN 476 (H) 05/08/2024        Nutrition Focused Physical Exam:  Performed/Deferred: Deferred as pt visually appears well-nourished with no signs of malnutrition      Past Medical History:  Patient Active Problem List   Diagnosis    Abnormal abdominal CT scan    Abnormal MRI    Aortic dilatation (CMS-HCC)    Arthritis of left knee    Astigmatism, bilateral    Bilateral leg edema    Bilateral presbyopia    Bladder dysfunction    Blurry vision    Breast asymmetry    Breast cancer (Multi)    Cellulitis    Claustrophobia    Cough with hemoptysis    Dense breast tissue on mammogram    Dysmetabolic syndrome    Dyspepsia    Dyspnea on exertion    Dyspnea, paroxysmal nocturnal    Edema    Exposure to COVID-19 virus    Facial numbness    Gastritis    Headache    Hemangioma of skin and subcutaneous tissue    Hypermetropia of both eyes    Hypoestrogenism    Impaired fasting glucose    Irritable bowel syndrome with diarrhea    Knee effusion    Knee injuries    Knee pain    Macromastia    Melanocytic nevi of trunk    Migraine without aura and without status migrainosus, not intractable    Nausea with vomiting    Neoplasm of uncertain behavior of skin    Obesity, unspecified obesity severity, unspecified obesity type    Other melanin hyperpigmentation    Obstructive sleep apnea, adult    Other seborrheic keratosis    Personal history of malignant neoplasm of breast    Pyelonephritis    Rectocele    Restless leg syndrome    Sebaceous cyst    Urinary incontinence, mixed    Prediabetes    BMI 45.0-49.9, adult (Multi)    Iron deficiency anemia        Anthropometrics:  Ht Readings from Last 1 Encounters:   07/02/24 1.651 m (5' 5\")     BMI Readings from Last 1 Encounters:   07/02/24 46.76 kg/m²     Wt Readings from Last 10 Encounters:   07/02/24 127 kg (281 lb)   06/07/24 129 kg (283 lb 8 oz)   05/16/24 130 kg (287 lb 5.9 oz)   04/10/24 129 kg (284 lb 12.8 oz)   03/15/24 132 kg (291 " lb 6.4 oz)   02/08/24 133 kg (293 lb 3.4 oz)   01/18/24 133 kg (293 lb 14 oz)   01/15/24 132 kg (290 lb 3.2 oz)   01/12/24 133 kg (293 lb)   01/11/24 132 kg (292 lb)       Weight change: Wt loss of 3.4% (10 lbs) x 4 months   Significant weight change: No    Estimated Nutrition Needs:    Total Energy Estimated Needs (kCal): 1700 kCal   Method for Estimating Needs: MSJ 1840 x 1.2 - 500 (for weight loss)   Total Protein Estimated Needs (g): 102 g   Total Protein Estimated Needs (g/kg): 0.8 g/kg    Nutrition Diagnosis     Patient has Nutrition Diagnosis: Yes Diagnosis Status (1): New  Nutrition Diagnosis 1: Food and nutrition related knowledge deficit Related to (1): limited prior nutrition education for health promoting weight loss As Evidenced by (1): patient's desire to learn     Diagnosis Status (2): New Nutrition Diagnosis 2: Altered nutrition related to laboratory values  Nutrition Diagnosis 2: Altered nutrition related to laboratory values Related to (2): elevated blood sugar As Evidenced by (2): A1c = 6.0% (12/7/23)       Nutrition Interventions/Recommendations     NUTRITION EDUCATION:   Provided Keys for a Healthy Lifestyle Handout. Discussed the following:  Start a daily exercise routine. Adults should target 150 minutes of moderate intensity exercise each week. Start slow and work your way up to this amount. Provided examples of aerobic, resistance, and stretching/balance activities.  Plan balanced meals. The “Plate Method” is a tool used to plan balanced meals. Aim for the following:  One-third to half the plate (or the meal) should be a non-starchy vegetable. Non-starchy vegetables include broccoli, cauliflower, salad greens, carrots, cucumbers, asparagus, green beans, tomatoes, and many more.  One-third to a quarter of the plate should be a lean protein. Lean proteins include chicken, turkey, fish, lean beef, eggs, nuts, tofu.  One third to a quarter of the plate can be a complex starch or carbohydrate.  "Examples of complex starches / carbohydrates include starchy vegetables (potatoes, peas, corn, and butternut squash), grains (whole wheat bread, quinoa, and brown rice), legumes (lentils and beans), and fruit.  Olive oil should be the primary fat source used for cooking.  Use a 9-10 inch plate to help manage portions  Pre-plan meal ideas. Spending time planning upfront saves you from relying on convenience foods. It can also help you save money! Your plan does not need to be rigid, but you should have some idea of what meals you are going to make going into the week. Place this information on the refrigerator in plain sight. There are many products you can purchase to help keep you organized.   Stay hydrated with water or other lower calorie beverages. We often confuse our body's signal for thirst with being hungry. Make sure you are staying hydrated, preferably with water. The best indicator of hydration is your urine. It should be a pale yellow. Provided examples of sugar-free beverages and ways to flavor water.       Educational Handouts: Keys for a Healthy Lifestyle, High Protein Snack Ideas    *Patient expressed understanding of the education provided and denied any additional questions/concerns.       Nutrition Monitoring and Evaluation   Intentional weight loss of 0.5-2 lb per week, trending toward a clinically significant weight loss of 5-10% of current body weight.   A1c less than 5.7%       Patient's Goals:   1) Continue with PT and do exercises as recommended  2) Add upper body strength workout 2x per week for 15 minutes  3) Use the \"Plate Method to guide meal prep for at least 1-2 meals per week   4) Reference the snack list for ideas     Readiness to Change : Excellent  Level of Understanding : Excellent  Anticipated Compliant : Excellent     Follow-up:  8 weeks     "

## 2024-07-02 NOTE — PATIENT INSTRUCTIONS
"1) Continue with PT and do exercises as recommended  2) Add upper body strength workout 2x per week for 15 minutes  3) Use the \"Plate Method to guide meal prep for at least 1-2 meals per week   4) Reference the snack list for ideas   "

## 2024-07-03 PROBLEM — M17.11 ARTHRITIS OF RIGHT KNEE: Status: ACTIVE | Noted: 2024-07-03

## 2024-07-03 NOTE — PROGRESS NOTES
"Physical Therapy    Physical Therapy Evaluation and Treatment      Patient Name: Calvin Monroe \"Ashlyn"  MRN: [id number]  Today's Date: 7/5/24  Visit 2  Time Calculation  Start Time: 0815  Stop Time: 0855  Time Calculation (min): 40 min       PT Therapeutic Procedures Time Entry  Therapeutic Exercise Time Entry: 40       Assessment:  Fouzia Monroe barely clears buttock from treatment table with pelvic bridge . She was fatigued with LE strengthening exercises and needed at least 30sec to recover from each exercise set.    Plan:   Gradual core and LE strength progression    Current Problem:   1. Chronic pain of right knee        2. Arthritis of right knee            Subjective    Fouzia Monroe reports that pain is status quo. She did do HEP without aggravating pain.  Pain:   R> L knee pain 5/10      Treatments:  There ex:  SLR 2 x 5 each leg 3 sec pause  Pelvic bridge 4 x 5  Recumbent stepper 6 min at 30 RPM lv 2  T band lateral step out for proximal  hip strength 2 x 10  EDUCATION:  Written handout given for hip abduction and SLR exercises   Goals:  1. Decrease pain to 1-3/10 severity level   2. Increase R  knee flexion AROM to   100  deg   3. Increase bilat knee n strength to  5 /5 MMT grade  4. Improve outcome score by 15 points  5. independent Home exercise program                 "

## 2024-07-05 ENCOUNTER — TREATMENT (OUTPATIENT)
Dept: PHYSICAL THERAPY | Facility: CLINIC | Age: 64
End: 2024-07-05
Payer: COMMERCIAL

## 2024-07-05 DIAGNOSIS — M25.561 CHRONIC PAIN OF RIGHT KNEE: Primary | ICD-10-CM

## 2024-07-05 DIAGNOSIS — G89.29 CHRONIC PAIN OF RIGHT KNEE: Primary | ICD-10-CM

## 2024-07-05 DIAGNOSIS — M17.11 ARTHRITIS OF RIGHT KNEE: ICD-10-CM

## 2024-07-05 PROCEDURE — 97110 THERAPEUTIC EXERCISES: CPT | Mod: GP | Performed by: PHYSICAL THERAPIST

## 2024-07-08 ENCOUNTER — PHARMACY VISIT (OUTPATIENT)
Dept: PHARMACY | Facility: CLINIC | Age: 64
End: 2024-07-08
Payer: COMMERCIAL

## 2024-07-19 ENCOUNTER — TELEPHONE (OUTPATIENT)
Dept: PRIMARY CARE | Facility: CLINIC | Age: 64
End: 2024-07-19

## 2024-07-19 ENCOUNTER — TREATMENT (OUTPATIENT)
Dept: PHYSICAL THERAPY | Facility: CLINIC | Age: 64
End: 2024-07-19
Payer: COMMERCIAL

## 2024-07-19 DIAGNOSIS — M25.569 KNEE PAIN: Primary | ICD-10-CM

## 2024-07-19 DIAGNOSIS — R26.2 WALKING DIFFICULTY DUE TO KNEE JOINT DISORDER: ICD-10-CM

## 2024-07-19 DIAGNOSIS — R73.03 PREDIABETES: ICD-10-CM

## 2024-07-19 DIAGNOSIS — M17.12 ARTHRITIS OF LEFT KNEE: ICD-10-CM

## 2024-07-19 DIAGNOSIS — M25.9 WALKING DIFFICULTY DUE TO KNEE JOINT DISORDER: ICD-10-CM

## 2024-07-19 DIAGNOSIS — M17.11 ARTHRITIS OF RIGHT KNEE: ICD-10-CM

## 2024-07-19 PROCEDURE — 97110 THERAPEUTIC EXERCISES: CPT | Mod: GP | Performed by: PHYSICAL THERAPIST

## 2024-07-19 RX ORDER — DULAGLUTIDE 1.5 MG/.5ML
1.5 INJECTION, SOLUTION SUBCUTANEOUS
Qty: 2 ML | Refills: 0 | Status: SHIPPED | OUTPATIENT
Start: 2024-07-21

## 2024-07-19 NOTE — PROGRESS NOTES
"Physical Therapy    Physical Therapy Evaluation and Treatment      Patient Name: Calvin Monroe \"Ashlyn"  MRN: [id number]  Today's Date: 7/19/24  Visit 3  Time Calculation  Start Time: 0815  Stop Time: 0855  Time Calculation (min): 40 min       PT Therapeutic Procedures Time Entry  Therapeutic Exercise Time Entry: 40       Assessment:  Fouzia Monroe needs much cuing and guidance to perform PREs to muscular challenge without aggravating pain.    Plan:   Gradual core and LE strength progression    Current Problem:   1. Knee pain        2. Arthritis of left knee        3. Arthritis of right knee        4. Walking difficulty due to knee joint disorder            Subjective    Fouzia Monroe reports that her knee pain was more severe post exercise last visit, but pain returned to baseline after applying cold pack  Pain:   R> L knee pain 5/10      Treatments:  There ex:  SLR 2 x 5 each leg 3 sec pause  Pelvic bridge 4 x 5  Recumbent stepper 7 min at 40 RPM lv 2  T band lateral step out for proximal  hip strength 2 x 10 red band  Anti-rotation core activation 2 20 sec holds each side blue band  Alternate step traps x 10 each 8\" step   Supine SAQ  5 lbs 2 x 10 each leg    EDUCATION:    Goals:  1. Decrease pain to 1-3/10 severity level   2. Increase R  knee flexion AROM to   100  deg   3. Increase bilat knee n strength to  5 /5 MMT grade  4. Improve outcome score by 15 points  5. independent Home exercise program                 "

## 2024-07-19 NOTE — TELEPHONE ENCOUNTER
[facility] approved   Patient of   Needs a new rx for   dulaglutide (Trulicity) 1.5 mg/0.5 mL pen injector injection   [facility]   Phone: [phone]   Fax: [phone]   Was advised to look for a new pcp

## 2024-07-25 NOTE — PROGRESS NOTES
"Physical Therapy    Physical Therapy Evaluation and Treatment      Patient Name: Calvin Monroe \"Ashlyn"  MRN: [id number]  Today's Date: 24  Visit 4  Time Calculation  Start Time: 820  Stop Time: 855  Time Calculation (min): 35 min       PT Therapeutic Procedures Time Entry  Self-Care/Home Mgmt Trainin    PT Modalities Time Entry  Hot/Cold Pack Time Entry: 15  Assessment:  Fouzia Monroe has difficulty tolerating  even light submaximal isometric exercise. She twisted her knee a bit and that setup a severe medial R knee pain, I suggested that she go to a cane , perform only pain free isometric quad sets  Plan:, and manage the pain with cold application som anesthatize the R knee     Fouzia Monroe to follow up with referring physician    Current Problem:   1. Arthritis of right knee        2. Arthritis of left knee        3. Walking difficulty due to knee joint disorder            Subjective    Fouzia Monroe reports that her knee pain was more severe again and that she may ave twisted it causing increased anterior medial knee pain  Pain:   R> L knee pain \"12/10!\"    Objective  Fouzia Monroe ambulated into clinic with severe R LE antalgic gait no device    Treatments:  Not done today  There ex:  SLR 2 x 5 each leg 3 sec pause  Pelvic bridge 4 x 5  Recumbent stepper 7 min at 40 RPM lv 2  T band lateral step out for proximal  hip strength 2 x 10 red band  Anti-rotation core activation 2 20 sec holds each side blue band  Alternate step traps x 10 each 8\" step   Supine SAQ  5 lbs 2 x 10 each leg  Done today  Manual therapy:   Kinesiotape R knee pain relief technique  kinesiotape origin to insertion with   25% tape off tension   Modality:  Cold pack R knee AP 15 min  EDUCATION:  Self management quad setting exercise only, use of cane in L hand to deload R knee during ambulation  Goals:  1. Decrease pain to 1-3/10 severity level   2. Increase R  knee flexion " AROM to   100  deg   3. Increase bilat knee n strength to  5 /5 MMT grade  4. Improve outcome score by 15 points  5. independent Home exercise program

## 2024-07-26 ENCOUNTER — TREATMENT (OUTPATIENT)
Dept: PHYSICAL THERAPY | Facility: CLINIC | Age: 64
End: 2024-07-26
Payer: COMMERCIAL

## 2024-07-26 DIAGNOSIS — M17.12 ARTHRITIS OF LEFT KNEE: ICD-10-CM

## 2024-07-26 DIAGNOSIS — R26.2 WALKING DIFFICULTY DUE TO KNEE JOINT DISORDER: ICD-10-CM

## 2024-07-26 DIAGNOSIS — M25.9 WALKING DIFFICULTY DUE TO KNEE JOINT DISORDER: ICD-10-CM

## 2024-07-26 DIAGNOSIS — M17.11 ARTHRITIS OF RIGHT KNEE: Primary | ICD-10-CM

## 2024-07-26 PROCEDURE — 97535 SELF CARE MNGMENT TRAINING: CPT | Mod: GP | Performed by: PHYSICAL THERAPIST

## 2024-07-26 PROCEDURE — 97010 HOT OR COLD PACKS THERAPY: CPT | Mod: GP | Performed by: PHYSICAL THERAPIST

## 2024-07-29 ENCOUNTER — TELEPHONE (OUTPATIENT)
Dept: ORTHOPEDIC SURGERY | Facility: CLINIC | Age: 64
End: 2024-07-29
Payer: COMMERCIAL

## 2024-07-29 NOTE — TELEPHONE ENCOUNTER
Copied from CRM #[id number]. Topic: Information Request - Doctor, Hospital, or Provider  >> Jul 29, 2024  2:09 PM Livier LUQUE wrote:  Patient states she can't put pressure to walk and has to return back to work and is seeking help and direction. Patient can best be reached at [phone]

## 2024-07-30 ENCOUNTER — OFFICE VISIT (OUTPATIENT)
Dept: HEMATOLOGY/ONCOLOGY | Facility: CLINIC | Age: 64
End: 2024-07-30
Payer: COMMERCIAL

## 2024-07-30 ENCOUNTER — OFFICE VISIT (OUTPATIENT)
Dept: ORTHOPEDIC SURGERY | Facility: HOSPITAL | Age: 64
End: 2024-07-30
Payer: COMMERCIAL

## 2024-07-30 ENCOUNTER — HOSPITAL ENCOUNTER (OUTPATIENT)
Dept: RADIOLOGY | Facility: HOSPITAL | Age: 64
Discharge: HOME | End: 2024-07-30
Payer: COMMERCIAL

## 2024-07-30 VITALS — WEIGHT: 281 LBS | HEIGHT: 65 IN | BODY MASS INDEX: 46.82 KG/M2

## 2024-07-30 VITALS
RESPIRATION RATE: 20 BRPM | DIASTOLIC BLOOD PRESSURE: 68 MMHG | OXYGEN SATURATION: 95 % | TEMPERATURE: 97.7 F | SYSTOLIC BLOOD PRESSURE: 92 MMHG | BODY MASS INDEX: 46.91 KG/M2 | WEIGHT: 281.9 LBS | HEART RATE: 95 BPM

## 2024-07-30 DIAGNOSIS — C50.111 CANCER OF CENTRAL PORTION OF RIGHT BREAST (MULTI): ICD-10-CM

## 2024-07-30 DIAGNOSIS — M17.10 PRIMARY OSTEOARTHRITIS OF KNEE, UNSPECIFIED LATERALITY: ICD-10-CM

## 2024-07-30 DIAGNOSIS — S83.419A SPRAIN OF MEDIAL COLLATERAL LIGAMENT OF KNEE, INITIAL ENCOUNTER: ICD-10-CM

## 2024-07-30 DIAGNOSIS — M17.11 TRICOMPARTMENT OSTEOARTHRITIS OF RIGHT KNEE: Primary | ICD-10-CM

## 2024-07-30 PROCEDURE — 73564 X-RAY EXAM KNEE 4 OR MORE: CPT | Mod: RIGHT SIDE | Performed by: RADIOLOGY

## 2024-07-30 PROCEDURE — 99214 OFFICE O/P EST MOD 30 MIN: CPT | Performed by: NURSE PRACTITIONER

## 2024-07-30 PROCEDURE — 3008F BODY MASS INDEX DOCD: CPT | Performed by: SPECIALIST/TECHNOLOGIST

## 2024-07-30 PROCEDURE — 1036F TOBACCO NON-USER: CPT | Performed by: NURSE PRACTITIONER

## 2024-07-30 PROCEDURE — 99214 OFFICE O/P EST MOD 30 MIN: CPT | Performed by: SPECIALIST/TECHNOLOGIST

## 2024-07-30 PROCEDURE — 73564 X-RAY EXAM KNEE 4 OR MORE: CPT | Mod: RT

## 2024-07-30 PROCEDURE — 1036F TOBACCO NON-USER: CPT | Performed by: SPECIALIST/TECHNOLOGIST

## 2024-07-30 RX ORDER — NAPROXEN 500 MG/1
500 TABLET ORAL 2 TIMES DAILY
Qty: 28 TABLET | Refills: 0 | Status: SHIPPED | OUTPATIENT
Start: 2024-07-30 | End: 2024-08-13

## 2024-07-30 ASSESSMENT — PAIN SCALES - GENERAL
PAINLEVEL_OUTOF10: 10 - WORST POSSIBLE PAIN
PAINLEVEL: 10-WORST PAIN EVER

## 2024-07-30 ASSESSMENT — PAIN - FUNCTIONAL ASSESSMENT: PAIN_FUNCTIONAL_ASSESSMENT: 0-10

## 2024-07-30 NOTE — PROGRESS NOTES
Patient ID: Fouzia Monroe is a 64 y.o. female.    Cancer History:          Breast         AJCC Edition: 8th (AJCC), Diagnosis Date: 27-Jul-2021, IA, pT1b pN0 cM0 G2     Treatment Synopsis:    61-year-old postmenopausal AA female with rightt-sided invasive lobular carcinoma, stage IA (T1b N0 M0). The patient's breast cancer was diagnosed on July 27, 2021,  and is estrogen receptor positive at >95%, progesterone receptor positive at >95%, and HER-2/xiao negative, grade 2 with MammaPrint Index = +0.120; translating to Low Risk Luminal A with a 10-year risk of recurrence of 10%. Details of her history are as  follows:       07/21/2021: Patient underwent a bilateral MRI for screening due to dense breast. This showed a mass measuring 0.8 x 0.8 x 0.7 cm mass. No axillary  or internal mammary LN appreciated   07/27/2021: Second look targeted US of the right breast and axilla-revealed 5 normal looking LNs.    07/27/2021: Patient underwent a US-guided biopsy of the right breast at 12:00, 4 cm from the nipple. Pathology was consistent with results as above   08/19/2021: Patient underwent a right partial mastectomy with SLNB. Pathology showed a 0.6cminvasive carcinoma, grade 2, with 0/2 SLNs involved. Margins were negative   2/23/2022: Completed Radiation   03/03/2022: Started Arimidex  1/17/24 switched to letrozole 2.5 mg per day         Subjective    HPI  Seen for c/o left breast itching and fluid leaking from L nipple.  Patient noticed this on Saturday and is better since. I'm covering for Kay Lua CNP today who patient normally sees.     She continues on letrozole and said she tolerates that much better than anastrozole.       Energy and appetite pretty good.   Denies cough/fever/sob.   +reflux, off and on for over a year. Saw GI.   Bowels okay.   Doesn't check breasts for lumps.   +lightheadedness, saw ENT. Was told she may need PT for her neck.   +Twisted R knee on Wed last week, got worse on Friday. Said  she called PCP and they recommend she go to walk in clinic for ortho/sports medicine today for that today as she can't put pressure on it. Patient reports being told in the past she has bone on bone and needs replacement.   Using effexor for hot flashes and it helps.     Objective    BSA: 2.42 meters squared  BP 92/68 (BP Location: Left arm, Patient Position: Sitting, BP Cuff Size: Large adult)   Pulse 95   Temp 36.5 °C (97.7 °F) (Core)   Resp 20   Wt 128 kg (281 lb 14.4 oz)   SpO2 95%   BMI 46.91 kg/m²      Physical Exam  Vitals reviewed.   Constitutional:       General: She is not in acute distress.  Eyes:      General: No scleral icterus.  Cardiovascular:      Rate and Rhythm: Normal rate and regular rhythm.   Pulmonary:      Effort: Pulmonary effort is normal.      Breath sounds: Normal breath sounds.   Abdominal:      General: Bowel sounds are normal. There is no distension.      Palpations: Abdomen is soft.      Tenderness: There is no abdominal tenderness. There is no guarding.   Musculoskeletal:      Right lower leg: No edema.      Left lower leg: No edema.      Comments: Using walker due to knee pain   Lymphadenopathy:      Cervical: No cervical adenopathy.      Comments: No adenopathy head/neck/axilla/clavicular    L nipple without any discharge  No rash on L breast  No lumps or nodules appreciated in either breast  Scar left breast from prior skin biopsy at about 2:00    R partial noted    Skin:     General: Skin is warm and dry.   Neurological:      Mental Status: She is alert and oriented to person, place, and time.   Psychiatric:         Mood and Affect: Mood normal.         Behavior: Behavior normal.           12/22/23 mammogram was benign     Assessment/Plan   Recent itching L breast with patient reported nipple discharge.   No discharge on exam today. No lumps or nodules or rash appreciated. However, given patient report of symptoms, I ordered a L diagnostic mammogram. If her mammogram is  okay, she is due for bilateral mammogram in December. She also needs follow up scheduled with Kay Lua CNP. I will await her mammogram results.     For her R knee pain after twisting it last week, she is going to walk in ortho/sports med clinic today.              Diagnoses and all orders for this visit:  Cancer of central portion of right breast (Multi)  -     Clinic Appointment Request Follow Up; RENE GILLIAM; [facility] [id number]  -     BI mammo left diagnostic; Future           ZAID High-CNP

## 2024-07-30 NOTE — PROGRESS NOTES
"Chief Complaint: No chief complaint on file.       HPI:  Calvin Monroe \"Ashlyn" is a 64 y.o. female presenting to the orthopedic walk-in clinic with right knee pain occurring on 7/24/2024 when she was in her kitchen twisted on a planted right leg and partially fell.  Today she reports a throbbing sensation over the medial aspect of her knee that worsens with walking and weightbearing.  Today she is using a walker for ambulation secondary to pain.  She saw her physical therapist at the end of last week who suggested to take Tylenol and ice and provided her with some KT taping.  She feels that these interventions are not providing her with much relief of her symptoms.  She is an established patient of Dr. Hirsch who has been managing her osteoarthritic changes and provided her with an intra-articular corticosteroid injection on 5/23/2024 which she said provided her with some relief of her symptoms.  She is currently on a weight loss regimen in preparation for a total knee replacement.  She denies any numbness or tingling into the right lower extremity.  She presents for treatment recommendations.    Objective     ROS:  Constitutional: No fever, no chills, not feeling tired, no recent weight gain and no recent weight loss  ENT: No nosebleeds  Cardiovascular: No chest pain  Respiratory: No shortness of breath and no cough  Gastrointestinal: No abdominal pain, no nausea, no diarrhea, and no vomiting  Musculoskeletal: Positive for right knee pain  Integumentary: No rashes and no skin lesions  Neurological: No headache  Psychiatric: No sleep disturbances no depression  Endocrine: No muscle weakness and no muscle cramps  Hematologic/lymphatic: No swelling glands and no tendency for easy bruising    Past Medical History:   Diagnosis Date    Anxiety disorder, unspecified     Anxiety    Bilious vomiting 04/26/2016    Bilious vomiting with nausea    Breast CA (Multi)     Headache, unspecified 02/03/2017    " Headache    Irritable bowel syndrome with diarrhea 04/07/2016    Irritable bowel syndrome with diarrhea    Mammographic calcification found on diagnostic imaging of breast 06/01/2015    Breast calcification, right    ARIAN (obstructive sleep apnea)     Overactive bladder     OAB (overactive bladder)    Personal history of diseases of the blood and blood-forming organs and certain disorders involving the immune mechanism     History of anemia    Personal history of diseases of the skin and subcutaneous tissue 07/27/2018    History of cyst of breast    Personal history of other diseases of the female genital tract 08/01/2017    History of breast pain    Personal history of other mental and behavioral disorders     History of depression    Personal history of other specified conditions     History of lump of left breast        Past Surgical History:   Procedure Laterality Date    BREAST LUMPECTOMY Right 08/2021    HERNIA REPAIR  10/17/2014    Hernia Repair    MR HEAD ANGIO WO IV CONTRAST  06/12/2021    MR HEAD ANGIO WO IV CONTRAST 6/12/2021 [facility]    MR NECK ANGIO WO IV CONTRAST  06/12/2021    MR NECK ANGIO WO IV CONTRAST 6/12/2021 [facility]    OTHER SURGICAL HISTORY  10/17/2014    Laparoscopic Excision Of Ectopic Pregnancy And Salpingectomy    OTHER SURGICAL HISTORY  08/03/2021    Knee arthroscopy        Social Connections: Not on file          Physical Exam:  General appearance: WN, WD female, in no acute distress  Skin: No rashes, lesions or wounds  Head: Normocephalic, no evidence of trauma  Eye: EOMI, conjunctiva clear, no discharge  ENT: Nares patent  Neck: No abnormal contour, tracheal midline  Chest/lungs: No respiratory distress, speaking in complete sentences  Musculoskeletal: Tender to palpation over the medial joint line, medial femoral condyle and medial tibial plateau.  Positive valgus stress test.  Stable varus stress test.  She is able to perform an isometric quadriceps  contraction and straight leg raise with approximately 3 degree extension lag secondary to pain.  Knee flexion to 90 degrees which lacks approximately 10 degrees of terminal knee flexion compared to her left leg.  Small effusion.  No  muscle wasting, rigidity    Neurological: A&O x3, no focal deficits, intact bilateral LE  Psych: normal affect, mood, appearance      Image Results:  X-rays taken on 7/30/2024 reviewed with the patient in the office today and reveal no acute fractures or dislocations.  There is severe osteoarthritic changes seen in the medial and patellofemoral compartments with moderate to severe lateral compartment osteoarthritic changes.      Assessment/Plan   Encounter Diagnoses:  Primary osteoarthritis of knee, unspecified laterality    Sprain of medial collateral ligament of knee, initial encounter    Orders Placed This Encounter    Knee Brace, Hinged    XR knee right 4+ views       The patient and I discussed her clinical presentation and physical exam findings consistent with right knee osteoarthritis versus MCL sprain.  We agreed upon continued conservative management at this time.  We had a lengthy discussion regarding her most recent visit with Dr. Hirsch and the intra-articular corticosteroid injection that was provided to her.  Unfortunately, it is too soon for a repeated injection at today's visit.  She voiced understanding of this.  Since, on exam, there is some evidence of sprain on the MCL such as a positive valgus stress test and tenderness over the origin and insertion of the medial collateral ligament we agreed upon a hinged knee brace for additional support.  She will wear this while ambulatory.  Fortunately, we did not have one that appropriately fit her.  She will look at obtaining one online.  She may take this off to sleep.  She may take this off to shower.  She will continue to ice her knee 20 minutes at a time at least 2 or 3 times per day.  I encouraged her to elevate her  extremity is much as she can throughout the day even at work.  She may continue with formal physical therapy to tolerance.  She is currently on the letrozole regimen secondary to her breast cancer.  I reached out to her oncology team about adding naproxen 500 mg twice daily for the next 2 weeks they had no objections to this medication use.  This was sent to her pharmacy for her.  Will follow-up on 8/27/2024 with Dr. Hirsch for continued management.  She is in agreement this plan.  All of her questions have been answered.    ** This office note was dictated using Dragon voice to text software and was not proofread for spelling or grammatical errors **

## 2024-08-06 ENCOUNTER — HOSPITAL ENCOUNTER (OUTPATIENT)
Dept: RADIOLOGY | Facility: CLINIC | Age: 64
Discharge: HOME | End: 2024-08-06
Payer: COMMERCIAL

## 2024-08-06 VITALS — WEIGHT: 279.98 LBS | BODY MASS INDEX: 46.65 KG/M2 | HEIGHT: 65 IN

## 2024-08-06 DIAGNOSIS — C50.111 CANCER OF CENTRAL PORTION OF RIGHT BREAST (MULTI): ICD-10-CM

## 2024-08-06 PROCEDURE — 77065 DX MAMMO INCL CAD UNI: CPT | Mod: LEFT SIDE | Performed by: STUDENT IN AN ORGANIZED HEALTH CARE EDUCATION/TRAINING PROGRAM

## 2024-08-06 PROCEDURE — 76642 ULTRASOUND BREAST LIMITED: CPT | Mod: LT

## 2024-08-06 PROCEDURE — 76981 USE PARENCHYMA: CPT | Mod: LT

## 2024-08-06 PROCEDURE — 77061 BREAST TOMOSYNTHESIS UNI: CPT | Mod: LEFT SIDE | Performed by: STUDENT IN AN ORGANIZED HEALTH CARE EDUCATION/TRAINING PROGRAM

## 2024-08-06 PROCEDURE — 76642 ULTRASOUND BREAST LIMITED: CPT | Mod: LEFT SIDE | Performed by: STUDENT IN AN ORGANIZED HEALTH CARE EDUCATION/TRAINING PROGRAM

## 2024-08-06 PROCEDURE — 77061 BREAST TOMOSYNTHESIS UNI: CPT | Mod: LT

## 2024-08-07 DIAGNOSIS — Z85.3 PERSONAL HISTORY OF BREAST CANCER: Primary | ICD-10-CM

## 2024-08-27 ENCOUNTER — APPOINTMENT (OUTPATIENT)
Dept: ORTHOPEDIC SURGERY | Facility: HOSPITAL | Age: 64
End: 2024-08-27
Payer: COMMERCIAL

## 2024-09-05 ENCOUNTER — TELEPHONE (OUTPATIENT)
Dept: PRIMARY CARE | Facility: CLINIC | Age: 64
End: 2024-09-05
Payer: COMMERCIAL

## 2024-09-05 NOTE — TELEPHONE ENCOUNTER
This patient is a former patient of Dr. Antonella Lagunas, who is no longer Internal Medicine as of June 18,2024.     Patient has been trying to get a refill request for    dulaglutide (Trulicity) 1.5 mg/0.5 mL pen injector injection     Please send refill to   [facility]/pharmacy #[id number] - [place] [id number] [facility], AT [address],[facility] [address]

## 2024-09-07 ENCOUNTER — APPOINTMENT (OUTPATIENT)
Dept: OPHTHALMOLOGY | Facility: CLINIC | Age: 64
End: 2024-09-07
Payer: COMMERCIAL

## 2024-09-07 DIAGNOSIS — H52.4 BILATERAL PRESBYOPIA: ICD-10-CM

## 2024-09-07 DIAGNOSIS — H52.223 REGULAR ASTIGMATISM OF BOTH EYES: Primary | ICD-10-CM

## 2024-09-07 PROCEDURE — 92014 COMPRE OPH EXAM EST PT 1/>: CPT | Performed by: OPTOMETRIST

## 2024-09-07 PROCEDURE — 92015 DETERMINE REFRACTIVE STATE: CPT | Performed by: OPTOMETRIST

## 2024-09-07 ASSESSMENT — REFRACTION_MANIFEST
OD_AXIS: 095
OS_SPHERE: +1.00
OD_CYLINDER: -1.25
OS_AXIS: 105
OS_ADD: +2.50
OD_SPHERE: +1.25
OS_CYLINDER: -1.50
OD_ADD: +2.50

## 2024-09-07 ASSESSMENT — REFRACTION_WEARINGRX
OD_ADD: +2.50
OS_AXIS: 093
OS_SPHERE: +0.75
OD_AXIS: 088
OD_SPHERE: +1.00
OD_CYLINDER: -1.25
OS_CYLINDER: -1.50
SPECS_TYPE: BIFOCAL
OS_ADD: +2.50

## 2024-09-07 ASSESSMENT — CONF VISUAL FIELD
OD_SUPERIOR_NASAL_RESTRICTION: 0
OS_INFERIOR_TEMPORAL_RESTRICTION: 0
OS_INFERIOR_NASAL_RESTRICTION: 0
OD_INFERIOR_NASAL_RESTRICTION: 0
OD_INFERIOR_TEMPORAL_RESTRICTION: 0
OS_SUPERIOR_NASAL_RESTRICTION: 0
OD_SUPERIOR_TEMPORAL_RESTRICTION: 0
OS_NORMAL: 1
OD_NORMAL: 1
OS_SUPERIOR_TEMPORAL_RESTRICTION: 0

## 2024-09-07 ASSESSMENT — VISUAL ACUITY
OS_CC+: -2
OD_CC+: -1
OS_CC: 20/25
METHOD: SNELLEN - LINEAR
OD_CC: 20/20
CORRECTION_TYPE: GLASSES

## 2024-09-07 ASSESSMENT — EXTERNAL EXAM - RIGHT EYE: OD_EXAM: NORMAL

## 2024-09-07 ASSESSMENT — EXTERNAL EXAM - LEFT EYE: OS_EXAM: NORMAL

## 2024-09-07 ASSESSMENT — SLIT LAMP EXAM - LIDS
COMMENTS: GOOD POSITION
COMMENTS: GOOD POSITION

## 2024-09-07 ASSESSMENT — CUP TO DISC RATIO
OS_RATIO: .3
OD_RATIO: .3

## 2024-09-07 ASSESSMENT — TONOMETRY
IOP_METHOD: GOLDMANN APPLANATION
OS_IOP_MMHG: 16
OD_IOP_MMHG: 16

## 2024-09-07 ASSESSMENT — ENCOUNTER SYMPTOMS: EYES NEGATIVE: 1

## 2024-09-07 NOTE — PROGRESS NOTES
A spectacle prescription was dispensed to be used as needed. Minor change.     NS and ACC and age appropriate.     The patient was asked to return to our clinic in one year or sooner   if ocular or vision changes occur

## 2024-09-09 ENCOUNTER — LAB (OUTPATIENT)
Dept: LAB | Facility: LAB | Age: 64
End: 2024-09-09
Payer: COMMERCIAL

## 2024-09-09 DIAGNOSIS — D50.9 IRON DEFICIENCY ANEMIA, UNSPECIFIED IRON DEFICIENCY ANEMIA TYPE: ICD-10-CM

## 2024-09-09 DIAGNOSIS — R79.89 ELEVATED FERRITIN: ICD-10-CM

## 2024-09-09 DIAGNOSIS — R73.03 PREDIABETES: ICD-10-CM

## 2024-09-09 LAB
BASOPHILS # BLD AUTO: 0.05 X10*3/UL (ref 0–0.1)
BASOPHILS NFR BLD AUTO: 0.7 %
D DIMER PPP FEU-MCNC: 726 NG/ML FEU
EOSINOPHIL # BLD AUTO: 0.23 X10*3/UL (ref 0–0.7)
EOSINOPHIL NFR BLD AUTO: 3.1 %
ERYTHROCYTE [DISTWIDTH] IN BLOOD BY AUTOMATED COUNT: 14.6 % (ref 11.5–14.5)
HCT VFR BLD AUTO: 39.3 % (ref 36–46)
HGB BLD-MCNC: 12.3 G/DL (ref 12–16)
IMM GRANULOCYTES # BLD AUTO: 0.02 X10*3/UL (ref 0–0.7)
IMM GRANULOCYTES NFR BLD AUTO: 0.3 % (ref 0–0.9)
LYMPHOCYTES # BLD AUTO: 3.52 X10*3/UL (ref 1.2–4.8)
LYMPHOCYTES NFR BLD AUTO: 46.9 %
MCH RBC QN AUTO: 25.7 PG (ref 26–34)
MCHC RBC AUTO-ENTMCNC: 31.3 G/DL (ref 32–36)
MCV RBC AUTO: 82 FL (ref 80–100)
MONOCYTES # BLD AUTO: 0.49 X10*3/UL (ref 0.1–1)
MONOCYTES NFR BLD AUTO: 6.5 %
NEUTROPHILS # BLD AUTO: 3.19 X10*3/UL (ref 1.2–7.7)
NEUTROPHILS NFR BLD AUTO: 42.5 %
NRBC BLD-RTO: 0 /100 WBCS (ref 0–0)
PLATELET # BLD AUTO: 309 X10*3/UL (ref 150–450)
RBC # BLD AUTO: 4.78 X10*6/UL (ref 4–5.2)
WBC # BLD AUTO: 7.5 X10*3/UL (ref 4.4–11.3)

## 2024-09-09 PROCEDURE — 83550 IRON BINDING TEST: CPT

## 2024-09-09 PROCEDURE — 85379 FIBRIN DEGRADATION QUANT: CPT

## 2024-09-09 PROCEDURE — 81256 HFE GENE: CPT

## 2024-09-09 PROCEDURE — 83540 ASSAY OF IRON: CPT

## 2024-09-09 PROCEDURE — 83615 LACTATE (LD) (LDH) ENZYME: CPT

## 2024-09-09 PROCEDURE — 82728 ASSAY OF FERRITIN: CPT

## 2024-09-09 PROCEDURE — 85025 COMPLETE CBC W/AUTO DIFF WBC: CPT

## 2024-09-09 PROCEDURE — 36415 COLL VENOUS BLD VENIPUNCTURE: CPT

## 2024-09-09 RX ORDER — DULAGLUTIDE 1.5 MG/.5ML
1.5 INJECTION, SOLUTION SUBCUTANEOUS
Qty: 2 ML | Refills: 0 | Status: SHIPPED | OUTPATIENT
Start: 2024-09-09

## 2024-09-09 NOTE — TELEPHONE ENCOUNTER
This patients' PCP is not with our practice anymore, but has upcoming appt with you on 9/30/24 to establish care. Per new PCI directive, please refill the patients meds. Please see your manager or you can contact  Franny Kirkpatrick if you have questions.

## 2024-09-10 LAB
FERRITIN SERPL-MCNC: 433 NG/ML (ref 8–150)
IRON SATN MFR SERPL: 12 % (ref 25–45)
IRON SERPL-MCNC: 34 UG/DL (ref 35–150)
LDH SERPL L TO P-CCNC: 138 U/L (ref 84–246)
TIBC SERPL-MCNC: 286 UG/DL (ref 240–445)
UIBC SERPL-MCNC: 252 UG/DL (ref 110–370)

## 2024-09-11 ENCOUNTER — HOSPITAL ENCOUNTER (OUTPATIENT)
Dept: RADIOLOGY | Facility: EXTERNAL LOCATION | Age: 64
Discharge: HOME | End: 2024-09-11

## 2024-09-11 ENCOUNTER — OFFICE VISIT (OUTPATIENT)
Dept: ORTHOPEDIC SURGERY | Facility: HOSPITAL | Age: 64
End: 2024-09-11
Payer: COMMERCIAL

## 2024-09-11 DIAGNOSIS — R79.89 ELEVATED FERRITIN: Primary | ICD-10-CM

## 2024-09-11 DIAGNOSIS — M17.11 TRICOMPARTMENT OSTEOARTHRITIS OF RIGHT KNEE: Primary | ICD-10-CM

## 2024-09-11 PROCEDURE — 20611 DRAIN/INJ JOINT/BURSA W/US: CPT | Mod: RT | Performed by: STUDENT IN AN ORGANIZED HEALTH CARE EDUCATION/TRAINING PROGRAM

## 2024-09-11 PROCEDURE — 99214 OFFICE O/P EST MOD 30 MIN: CPT | Performed by: STUDENT IN AN ORGANIZED HEALTH CARE EDUCATION/TRAINING PROGRAM

## 2024-09-11 PROCEDURE — 99214 OFFICE O/P EST MOD 30 MIN: CPT | Mod: GC | Performed by: STUDENT IN AN ORGANIZED HEALTH CARE EDUCATION/TRAINING PROGRAM

## 2024-09-11 PROCEDURE — 2500000004 HC RX 250 GENERAL PHARMACY W/ HCPCS (ALT 636 FOR OP/ED): Performed by: STUDENT IN AN ORGANIZED HEALTH CARE EDUCATION/TRAINING PROGRAM

## 2024-09-11 RX ORDER — ROPIVACAINE HYDROCHLORIDE 5 MG/ML
3 INJECTION, SOLUTION EPIDURAL; INFILTRATION; PERINEURAL
Status: COMPLETED | OUTPATIENT
Start: 2024-09-11 | End: 2024-09-11

## 2024-09-11 RX ORDER — METHYLPREDNISOLONE ACETATE 40 MG/ML
40 INJECTION, SUSPENSION INTRA-ARTICULAR; INTRALESIONAL; INTRAMUSCULAR; SOFT TISSUE
Status: COMPLETED | OUTPATIENT
Start: 2024-09-11 | End: 2024-09-11

## 2024-09-11 ASSESSMENT — PAIN - FUNCTIONAL ASSESSMENT: PAIN_FUNCTIONAL_ASSESSMENT: 0-10

## 2024-09-11 ASSESSMENT — PAIN SCALES - GENERAL: PAINLEVEL_OUTOF10: 9

## 2024-09-11 NOTE — PROGRESS NOTES
REFERRAL SOURCE: No ref. provider found     CHIEF COMPLAINT: right knee pain    HISTORY OF PRESENT ILLNESS  Calvin Monroe is a very pleasant 64 y.o. female with history of breast cancer and obesity with BMI 46 who is here for evaluation of right knee pain.     Previous History:  5/23/24: She complains of right anterior knee pain that is achy and sharp with movement.  Pain can be severe. It does not radiate. This started in December 2023 without injury.  She was previously taking medication for her breast cancer that was causing pain in all of her joints and she stopped it on December 22 at which time she was left with just right knee pain.  Pain is worse with activity and improves with rest.  She has tried Tylenol and voltaren gel that help a little.  She has not done any physical therapy.  She has never had injections.  She has been working on weight loss and has changed her diet and is on Trulicity.    She underwent ultrasound-guided right knee joint corticosteroid injection on 5/23/2024.    Interval History:  9/11/24: She noted 6 weeks of relief following the corticosteroid injection performed at the last visit.  She has now had return of her right knee pain that is worse with activity and improves with rest.  Pain is diffuse.  She has been working on weight loss and is on medication.  She is hoping to eventually lose enough weight to get the knee replaced.    MEDS    Current Outpatient Medications:     diclofenac sodium (Voltaren) 1 % gel, Apply 4.5 inches (4 g) topically 4 times a day., Disp: 100 g, Rfl: 3    dulaglutide (Trulicity) 1.5 mg/0.5 mL pen injector injection, Inject 1.5 mg under the skin 1 (one) time per week., Disp: 2 mL, Rfl: 0    letrozole (Femara) 2.5 mg tablet, Take 1 tablet (2.5 mg total) by mouth once daily.  Take with or without food., Disp: 90 tablet, Rfl: 3    venlafaxine XR (Effexor XR) 37.5 mg 24 hr capsule, Take 1 capsule (37.5 mg) by mouth once daily. Do not crush or chew.,  Disp: 90 capsule, Rfl: 1    ALLERGIES  Allergies   Allergen Reactions    Adhesive Tape-Silicones Itching    Latex Hives    Meperidine Other     GI upset    Morphine Other     GI upset       PAST MEDICAL HISTORY  Past Medical History:   Diagnosis Date    Anxiety disorder, unspecified     Anxiety    Bilious vomiting 04/26/2016    Bilious vomiting with nausea    Breast CA (Multi)     Headache, unspecified 02/03/2017    Headache    Irritable bowel syndrome with diarrhea 04/07/2016    Irritable bowel syndrome with diarrhea    Mammographic calcification found on diagnostic imaging of breast 06/01/2015    Breast calcification, right    ARIAN (obstructive sleep apnea)     Overactive bladder     OAB (overactive bladder)    Personal history of diseases of the blood and blood-forming organs and certain disorders involving the immune mechanism     History of anemia    Personal history of diseases of the skin and subcutaneous tissue 07/27/2018    History of cyst of breast    Personal history of other diseases of the female genital tract 08/01/2017    History of breast pain    Personal history of other mental and behavioral disorders     History of depression    Personal history of other specified conditions     History of lump of left breast       PAST SURGICAL HISTORY  Past Surgical History:   Procedure Laterality Date    BREAST LUMPECTOMY Right 08/2021    HERNIA REPAIR  10/17/2014    Hernia Repair    MR HEAD ANGIO WO IV CONTRAST  06/12/2021    MR HEAD ANGIO WO IV CONTRAST 6/12/2021 [facility]    MR NECK ANGIO WO IV CONTRAST  06/12/2021    MR NECK ANGIO WO IV CONTRAST 6/12/2021 [facility]    OTHER SURGICAL HISTORY  10/17/2014    Laparoscopic Excision Of Ectopic Pregnancy And Salpingectomy    OTHER SURGICAL HISTORY  08/03/2021    Knee arthroscopy       SOCIAL HISTORY   Social History     Socioeconomic History    Marital status:      Spouse name: Not on file    Number of children: Not on file    Years of  education: Not on file    Highest education level: Not on file   Occupational History    Not on file   Tobacco Use    Smoking status: Never    Smokeless tobacco: Never   Vaping Use    Vaping status: Never Used   Substance and Sexual Activity    Alcohol use: Yes     Comment: occasional glass of wine    Drug use: Never    Sexual activity: Defer   Other Topics Concern    Not on file   Social History Narrative    Not on file     Social Determinants of Health     Financial Resource Strain: Not on file   Food Insecurity: Not on file   Transportation Needs: Not on file   Physical Activity: Not on file   Stress: Not on file   Social Connections: Not on file   Intimate Partner Violence: Not on file   Housing Stability: Not on file       FAMILY HISTORY  Family History   Problem Relation Name Age of Onset    Other (murder) Mother      Alzheimer's disease Father      Diabetes Father      Lung cancer Father      Asthma Brother      Stomach cancer Maternal Grandmother      Breast cancer Paternal Grandmother      Other (cardiac disorder) Other      Stroke Other      Ovarian cancer Other      Breast cancer Other         REVIEW OF SYSTEMS  Except for those mentioned in the history of present illness, and below, a complete 14 point review of systems is negative.     Review of Systems    VITALS  There were no vitals filed for this visit.    PHYSICAL EXAMINATION   GENERAL:  Awake, alert, and oriented, no apparent distress, pleasant, and cooperative  PSYC: Mood is euthymic, affect is congruent  EAR, NOSE, THROAT:  Normocephalic, atraumatic, moist membranes, anicteric sclera  LUNG: Nonlabored breathing  HEART: No clubbing or cyanosis  SKIN: No increased erythema, warmth, rashes, or concerning skin lesions  NEURO: Sensation is intact in the bilateral lower extremities. Strength is grossly 5 out of 5 throughout the bilateral lower extremities, unless noted below.  GAIT: Antalgic  MUSCULOSKELETAL: Examination of the right knee: Range of  motion is 0-100 and limited by body habitus.  Mild effusion. No patellar apprehension. No tenderness to palpation of the medial or lateral joint lines, extensor mechanism or patellar facets.     IMAGING STUDIES:   Radiographs the right knee dated 4/10/2024 were personally reviewed and interpreted by me, Dr. Debbi Hirsch, and the findings shared with the patient.  There is moderate to severe osteoarthritis of the bilateral knees.     IMPRESSION  #1  Acute exacerbation of chronic right knee osteoarthritis  #2 Obesity, BMI 46    PLAN  The following was discussed with the patient:     Calvin Varela- is a very pleasant 64 y.o. female with history of breast cancer who is here for evaluation of right knee pain due to acute exacerbation of chronic right knee osteoarthritis.  -The diagnosis of knee arthritis was discussed in detail. The only cure for arthritis is a knee replacement. Without curing arthritis, we can improve the pain that the patient experiences and hopefully allow them to continue to do the activities that they enjoy.  Discussed that she would need to be at a weight of about 240 pounds before proceeding with a knee replacement.  She is motivated to reach this goal.  -Physical therapy: Work on hip abductor, knee extensor, core strengthening and flexibility with PT. The patient was given a referral to physical therapy today.  -Weight loss and physical activity: The benefits of weight loss and physical activity on improving pain experienced with arthritis were discussed.  She is working on weight loss on Trulicity.  She was encouraged to continue with these efforts.  -Bracing: Continue to wear knee brace.  -Medications: Continue to take Tylenol and use Voltaren gel over the counter.   -Injections: Injections, such as corticosteroid, viscosupplementation, and PRP can be utilized. The pros, cons, risks, benefits, pre-procedure and post-procedure protocols were discussed.  She wished to proceed with a  repeat ultrasound-guided right knee joint corticosteroid injection.  Given that she only got 6 weeks of relief with the prior steroid injection, we can proceed with viscosupplementation injections in the future.  Will begin the authorization process and order the injection.  -Follow-up in 3 months, or sooner if needed.    The patient was counseled to remain active, but avoid activities that worsen symptoms. The patient was in agreement with this plan. All questions were answered to the best of my ability.    PATIENT EDUCATION:  Education was discussed at today's appointment. A learning needs assessment was performed.    Primary learner: Calvin Varela-Field  Barriers to learning: None  Preferred language: English  Learning preferences include: Seeing and doing.  Discussed: Diagnosis and treatment plan.  Demonstrated: Understanding of material discussed.  Patient education materials given: None.  Learner response: Learner demonstrated understanding.    This note was dictated using Dragon speech recognition software and was not corrected for spelling or grammatical errors.    Patient seen and examined with PM&R resident, Dr. Barker. History, physical examination, pertinent imaging findings and the plan of care were discussed and I performed the key portions of the history, physical examination, and discussion of the plan of care. I have edited his note and agree with the findings. Dr. Barker performed the procedure under my direct supervision. I was present for the entire procedure.      Debbi Hirsch MD    [facility]   and [facility] Asp/Inj: R knee on 9/11/2024 11:32 AM  Details: ultrasound-guided  Medications: 40 mg methylPREDNISolone acetate 40 mg/mL; 3 mL ropivacaine 5 mg/mL (0.5 %)    Pre-Procedure Diagnosis: right knee pain, right knee osteoarthritis  Post-Procedure Diagnosis: right knee pain, right knee osteoarthritis    Procedure: US-guided right  knee corticosteroid injection    History Present Illness: Calvin Monroe is a pleasant 64 y.o. female with knee pain secondary to osteoarthritis who is here for the above procedure for improved pain control.     Medications and allergies were reviewed with the patient. No contraindications were identified.      Informed Consent:   Following denial of allergy and review of potential side effects and complications including but not necessarily limited to infection, allergic reaction, local tissue breakdown, systemic effects of corticosteroids, elevation of blood glucose, injury to soft tissue and/or nerves and seizure, the patient indicated understanding and agreed to proceed. Written consent to treatment was obtained and the patient verbalized consent for the procedure.    Procedural Details  The use of direct ultrasound visualization of the needle (rather than a non-guided injection) was required to increase patient safety by excluding inadvertent intramuscular or intratendinous placement and minimizing bleeding by avoiding osteochondral or vascular injury from the needle.  Additionally, the increased accuracy of placement may increase clinical effectiveness and will allow higher diagnostic specificity when evaluating effectiveness of this injection.    Using ultrasound, a pre-scan of the region was performed to identify the target structure.     The area was prepped with chlorhexidine, then re-examined using the same transducer, a sterile ultrasound transducer cover, and sterile ultrasound gel.    Procedural pause conducted to verify:  correct patient identity, procedure to be performed, and as applicable, correct side and site, correct patient position, and availability of implants, special equipment, or special requirements    Procedure:  Transducer: Linear array transducer.  Patient position: Supine with the knee bent to 30 degrees.  Localization process: The suprapatellar recess was localized in a  short axis view.  Local anesthesia: No local anesthesia was used.  Needle: A 25-gauge, 2-inch needle was used for the injectate.  Approach: A lateral to medial, in plane, approach was used to guide the needle tip into the suprapatellar recess deep to the quadriceps tendon and superficial to the prefemoral fat pad.   Injection/Aspiration: A mixture of 3 cc of 0.5% ropivocaine and 1 cc of DepoMedrol (40mg/mL) was injected into the right knee without complication.    Post-procedural care: The patient tolerated the procedure well and reported complete pain relief during the anesthetic phase. The patient was asked to ice for improved pain control and avoid submerging the area in water for the next 48 hours to help reduce the risk of infection. The patient was instructed to call the office immediately if there are any questions or concerns.     PATIENT EDUCATION:  Education of the diagnosis and treatment plan was discussed at today's appointment. A learning needs assessment was performed. The patient demonstrated understanding.

## 2024-09-16 LAB
ELECTRONICALLY SIGNED BY: NORMAL
HFE GENE MUT TESTED BLD/T: NORMAL
HFE P.C282Y BLD/T QL: NORMAL
HFE P.H63D BLD/T QL: NORMAL

## 2024-09-16 NOTE — PROGRESS NOTES
Nutrition: Follow-up     Reason for Nutrition Visit: Patient is a 64 y.o. female referred for obesity, class 3. Referred on 3/15/24 by Dr. Radu Lagunas.      Visit 1: 7/2/24      Nutrition Assessment    Food & Nutrition Related History: Pt presents in office. She feels a reduced sense of food cravings. Trying to eat 2 meals per day.     Dietary Considerations: minimal red meat  Allergies: None  Intolerance: Lactose  Appetite: Good  Intake: >75%  GI Symptoms : None  Swallowing Difficulty: No problems with swallowing  Dentition : own  Food Preparation: Patient  Cooking Skills/Barriers: None reported  Grocery Shopping: Patient  Supplements: Denies   Food Insecurity: Denies     Dietary Recall:   - Green beans, baked chicken, orange   - Today: Leftover green beans, potatoes, baked beans, 1 piece fish, mac and cheese     Snacks: guac   Beverages: water, juice, lemonade   Eating out: 1-2x per week   Alcohol Intake: glass of wine    Physical Activity: Has been doing some strength activities     Labs:    CMP trend:    Recent Labs     05/08/24  1736 04/10/24  1546 02/01/24  1801   GLUCOSE 90 85 105*    139 138   K 4.4 4.3 4.4    103 104   CO2 27 28 26   ANIONGAP 15 12 12   BUN 14 12 11   CREATININE 0.69 0.65 0.61   EGFR >90 >90 >90   CALCIUM 9.8 9.6 9.1   ALBUMIN 4.1 4.0 4.0   ALKPHOS 77 71 79   PROT 7.5 6.9 7.2   AST 14 12 13   BILITOT 0.4 0.4 0.3   ALT 12 12 14      Lipid Panel trend:    Recent Labs     09/12/23  1137 08/01/22  1319   CHOL 172 180   HDL 47.2 61.3   LDLF 99  --    VLDL 26  --    TRIG 128  --      Vit D:   Lab Results   Component Value Date    VITD25 42 08/01/2022     DM specific labs:   Recent Labs     12/07/23  1729 08/01/22  1319 06/13/21  0416   HGBA1C 6.0* 6.3* 5.9       Nutrition Focused Physical Exam:  Performed/Deferred: Deferred as pt visually appears well-nourished with no signs of malnutrition      Past Medical History:  Patient Active Problem List   Diagnosis    Abnormal abdominal CT  "scan    Abnormal MRI    Aortic dilatation (CMS-HCC)    Arthritis of left knee    Astigmatism, bilateral    Bilateral leg edema    Bilateral presbyopia    Bladder dysfunction    Blurry vision    Breast asymmetry    Breast cancer (Multi)    Cellulitis    Claustrophobia    Cough with hemoptysis    Dense breast tissue on mammogram    Dysmetabolic syndrome    Dyspepsia    Dyspnea on exertion    Dyspnea, paroxysmal nocturnal    Edema    Exposure to COVID-19 virus    Facial numbness    Gastritis    Headache    Hemangioma of skin and subcutaneous tissue    Hypermetropia of both eyes    Hypoestrogenism    Impaired fasting glucose    Irritable bowel syndrome with diarrhea    Knee effusion    Knee injuries    Knee pain    Macromastia    Melanocytic nevi of trunk    Migraine without aura and without status migrainosus, not intractable    Nausea with vomiting    Neoplasm of uncertain behavior of skin    Obesity, unspecified obesity severity, unspecified obesity type    Other melanin hyperpigmentation    Obstructive sleep apnea, adult    Other seborrheic keratosis    Personal history of malignant neoplasm of breast    Pyelonephritis    Rectocele    Restless leg syndrome    Sebaceous cyst    Urinary incontinence, mixed    Prediabetes    BMI 45.0-49.9, adult (Multi)    Iron deficiency anemia        Anthropometrics:  Ht Readings from Last 1 Encounters:   07/02/24 1.651 m (5' 5\")     BMI Readings from Last 1 Encounters:   09/18/24 46.26 kg/m²     Wt Readings from Last 10 Encounters:   09/18/24 126 kg (278 lb)   09/17/24 125 kg (276 lb)   08/06/24 127 kg (279 lb 15.8 oz)   07/30/24 127 kg (281 lb)   07/30/24 128 kg (281 lb 14.4 oz)   07/02/24 127 kg (281 lb)   06/07/24 129 kg (283 lb 8 oz)   05/16/24 130 kg (287 lb 5.9 oz)   04/10/24 129 kg (284 lb 12.8 oz)   03/15/24 132 kg (291 lb 6.4 oz)     Weight change: Since last visit, wt loss of 3 lbs x 6 weeks   Significant weight change: No    Estimated Nutrition Needs:    Total Energy " "Estimated Needs (kCal): 1700 kCal   Method for Estimating Needs: MSJ 1840 x 1.2 - 500 (for weight loss)   Total Protein Estimated Needs (g): 102 g   Total Protein Estimated Needs (g/kg): 0.8 g/kg    Nutrition Diagnosis     Patient has Nutrition Diagnosis: Yes Diagnosis Status (1): ongoing   Nutrition Diagnosis 1: Food and nutrition related knowledge deficit Related to (1): limited prior nutrition education for health promoting weight loss As Evidenced by (1): patient's desire to learn     Diagnosis Status (2): ongoing  Nutrition Diagnosis 2: Altered nutrition related to laboratory values  Nutrition Diagnosis 2: Altered nutrition related to laboratory values Related to (2): elevated blood sugar As Evidenced by (2): A1c = 6.0% (12/7/23)       Nutrition Interventions/Recommendations   NUTRITION COUNSELING: Motivational Interviewing, Goal Setting       Nutrition Monitoring and Evaluation   Intentional weight loss of 0.5-2 lb per week, trending toward a clinically significant weight loss of 5-10% of current body weight - partially met, ongoing   A1c less than 5.7%  - no update, ongoing       Patient's Goals:   1) Add upper body strength workout 2x per week for 15 minutes - PARTIALLY MET  2) Use the \"Plate Method to guide meal prep for at least 1-2 meals per week - MET, ONGOING   3) Reference the snack list for ideas - MET, ONGOING    Readiness to Change : Excellent  Level of Understanding : Excellent  Anticipated Compliant : Excellent     Follow-up: 8 weeks   "

## 2024-09-17 ENCOUNTER — APPOINTMENT (OUTPATIENT)
Dept: PRIMARY CARE | Facility: CLINIC | Age: 64
End: 2024-09-17
Payer: COMMERCIAL

## 2024-09-17 VITALS — BODY MASS INDEX: 45.93 KG/M2 | WEIGHT: 276 LBS

## 2024-09-17 DIAGNOSIS — E66.9 OBESITY, UNSPECIFIED OBESITY SEVERITY, UNSPECIFIED OBESITY TYPE: ICD-10-CM

## 2024-09-17 DIAGNOSIS — Z71.3 DIETARY COUNSELING AND SURVEILLANCE: Primary | ICD-10-CM

## 2024-09-18 ENCOUNTER — OFFICE VISIT (OUTPATIENT)
Dept: HEMATOLOGY/ONCOLOGY | Facility: CLINIC | Age: 64
End: 2024-09-18
Payer: COMMERCIAL

## 2024-09-18 VITALS
WEIGHT: 278 LBS | RESPIRATION RATE: 17 BRPM | BODY MASS INDEX: 46.26 KG/M2 | SYSTOLIC BLOOD PRESSURE: 140 MMHG | TEMPERATURE: 97.2 F | HEART RATE: 76 BPM | DIASTOLIC BLOOD PRESSURE: 87 MMHG | OXYGEN SATURATION: 96 %

## 2024-09-18 DIAGNOSIS — R79.89 ELEVATED FERRITIN: ICD-10-CM

## 2024-09-18 DIAGNOSIS — K76.0 FATTY LIVER: Primary | ICD-10-CM

## 2024-09-18 DIAGNOSIS — D50.9 IRON DEFICIENCY ANEMIA, UNSPECIFIED IRON DEFICIENCY ANEMIA TYPE: ICD-10-CM

## 2024-09-18 LAB — HOLD SPECIMEN: NORMAL

## 2024-09-18 PROCEDURE — 36415 COLL VENOUS BLD VENIPUNCTURE: CPT

## 2024-09-18 PROCEDURE — 85025 COMPLETE CBC W/AUTO DIFF WBC: CPT | Mod: GEALAB

## 2024-09-18 PROCEDURE — 99214 OFFICE O/P EST MOD 30 MIN: CPT

## 2024-09-18 PROCEDURE — 88185 FLOWCYTOMETRY/TC ADD-ON: CPT | Mod: TC

## 2024-09-18 SDOH — ECONOMIC STABILITY: FOOD INSECURITY: WITHIN THE PAST 12 MONTHS, THE FOOD YOU BOUGHT JUST DIDN'T LAST AND YOU DIDN'T HAVE MONEY TO GET MORE.: NEVER TRUE

## 2024-09-18 SDOH — ECONOMIC STABILITY: FOOD INSECURITY: WITHIN THE PAST 12 MONTHS, YOU WORRIED THAT YOUR FOOD WOULD RUN OUT BEFORE YOU GOT MONEY TO BUY MORE.: NEVER TRUE

## 2024-09-18 ASSESSMENT — PAIN SCALES - GENERAL: PAINLEVEL: 8

## 2024-09-19 ENCOUNTER — APPOINTMENT (OUTPATIENT)
Dept: HEMATOLOGY/ONCOLOGY | Facility: CLINIC | Age: 64
End: 2024-09-19
Payer: COMMERCIAL

## 2024-09-19 LAB
BASOPHILS # BLD AUTO: 0.01 X10*3/UL (ref 0–0.1)
BASOPHILS NFR BLD AUTO: 0.2 %
EOSINOPHIL # BLD AUTO: 0.24 X10*3/UL (ref 0–0.7)
EOSINOPHIL NFR BLD AUTO: 3.7 %
ERYTHROCYTE [DISTWIDTH] IN BLOOD BY AUTOMATED COUNT: 14.2 % (ref 11.5–14.5)
HCT VFR BLD AUTO: 38.8 % (ref 36–46)
HGB BLD-MCNC: 11.9 G/DL (ref 12–16)
IMM GRANULOCYTES # BLD AUTO: 0.01 X10*3/UL (ref 0–0.7)
IMM GRANULOCYTES NFR BLD AUTO: 0.2 % (ref 0–0.9)
LYMPHOCYTES # BLD AUTO: 3.18 X10*3/UL (ref 1.2–4.8)
LYMPHOCYTES NFR BLD AUTO: 48.8 %
MCH RBC QN AUTO: 25.8 PG (ref 26–34)
MCHC RBC AUTO-ENTMCNC: 30.7 G/DL (ref 32–36)
MCV RBC AUTO: 84 FL (ref 80–100)
MONOCYTES # BLD AUTO: 0.46 X10*3/UL (ref 0.1–1)
MONOCYTES NFR BLD AUTO: 7.1 %
NEUTROPHILS # BLD AUTO: 2.61 X10*3/UL (ref 1.2–7.7)
NEUTROPHILS NFR BLD AUTO: 40 %
PLATELET # BLD AUTO: 272 X10*3/UL (ref 150–450)
RBC # BLD AUTO: 4.61 X10*6/UL (ref 4–5.2)
WBC # BLD AUTO: 6.5 X10*3/UL (ref 4.4–11.3)

## 2024-09-20 ENCOUNTER — TELEPHONE (OUTPATIENT)
Dept: HEMATOLOGY/ONCOLOGY | Facility: HOSPITAL | Age: 64
End: 2024-09-20
Payer: COMMERCIAL

## 2024-09-20 DIAGNOSIS — D64.89: Primary | ICD-10-CM

## 2024-09-20 LAB
CELL COUNT (BLOOD): 6.5 X10*3/UL
CELL POPULATIONS: NORMAL
CYTOGENETICS/MOLECULAR TEST ORDERED: NO
DIAGNOSIS: NORMAL
FLOW DIFFERENTIAL: NORMAL
FLOW TEST ORDERED: NORMAL
LAB TEST METHOD: NORMAL
NUMBER OF CELLS COLLECTED: NORMAL PER TUBE
PATH REPORT.TOTAL CANCER: NORMAL
RBC MORPH BLD: NORMAL
SIGNATURE COMMENT: NORMAL
SPECIMEN VIABILITY: NORMAL
WBC MORPH BLD: NORMAL

## 2024-09-23 ENCOUNTER — LAB (OUTPATIENT)
Dept: LAB | Facility: LAB | Age: 64
End: 2024-09-23
Payer: COMMERCIAL

## 2024-09-23 DIAGNOSIS — D64.89: ICD-10-CM

## 2024-09-23 PROCEDURE — 81342 TRG GENE REARRANGEMENT ANAL: CPT

## 2024-09-23 PROCEDURE — 36415 COLL VENOUS BLD VENIPUNCTURE: CPT

## 2024-09-23 PROCEDURE — G0452 MOLECULAR PATHOLOGY INTERPR: HCPCS

## 2024-09-24 LAB
CHROM ANALY OVERALL INTERP-IMP: NORMAL
ELECTRONICALLY SIGNED BY CYTOGENETICS: NORMAL

## 2024-09-25 ENCOUNTER — TELEPHONE (OUTPATIENT)
Dept: HEMATOLOGY/ONCOLOGY | Facility: CLINIC | Age: 64
End: 2024-09-25
Payer: COMMERCIAL

## 2024-09-25 NOTE — PROGRESS NOTES
Comprehensive CCD (C-CDA v2.1)  
  
                         Created on: 2024  
  
  
TERRANCE FERRARA  
External Reference #: CDR,PersonID:[id number]  
: 1934  
Sex: Male  
  
Demographics  
  
  
                                        Address             [address] [place]  [address]  
   
                                        Home Phone          [phone]  
   
                                        Home Phone          [phone]  
   
                                        Preferred Language  en  
   
                                        Marital Status      Single  
   
                                        Qu[employer] Affiliation Unknown  
   
                                        Race                White  
   
                                        Ethnic Group        Not  or Lati  
no  
  
  
Author  
  
  
                                        Organization        [facility]  
  
  
Care Team Providers  
  
  
                                Care Team Member Name Role            Phone  
   
                                Unavailable     Primary Care Provider UnavailWILLIAM Hernández Attending       Unavailable  
   
                                BRYON ARZOLA  Primary Care    Unavailable  
   
                                Bryon Arzola  Primary Care    Unavailable  
   
                                MD Dashawn Rand Admitting       Unavailab  
MD Dashawn Kee Attending       Unavailab  
Bryon Henry  Primary Care    Unavailable  
   
                                Roger Martinez     Admitting       Unavailable  
   
                                Roger Martinez     Attending       Unavailable  
   
                                Bryon Arzola  Primary Care    Unavailable  
   
                                Ari Marte Admitting       Unavailab  
Ari Spicer Attending       Unavailab  
Bryon Henry  Primary Care    Unavailable  
   
                                Luis Carlos Fabianun    Admitting       Unavailable  
   
                                Luis Carlos Fabianun    Attending       Unavailable  
   
                                Bryon Arzola  Primary Care    Unavailable  
   
                                [employer]      Unavailable  
   
                                Gonzalo Mendez    Admitting       Unavailable  
   
                                Gonzalo Mendez    Attending       Unavailable  
   
                                Bryon Arzola  Attending       Unavailable  
   
                                Bryon Arzola  Primary Care    Unavailable  
   
                                Bryon Arzola  Attending       Unavailable  
   
                                Bryon Arzola  Primary Care    Unavailable  
   
                                Bryon Arzola  Primary Care    Unavailable  
   
                                Bryon Arzola  Attending       Unavailable  
   
                                Max Gloria Admitting       Unavailable  
   
                                Max Gloria Attending       Unavailable  
   
                                Bryon Arzola  Primary Care    Unavailable  
   
                                Bryon Arzola  Primary Care    Unavailable  
   
                                Rui, Stalin    Admitting       Unavailable  
   
                                RuiLuis Carlosun    Attending       Unavailable  
   
                                BHAVIN EARL      Attending       Unavailable  
   
                                BRYON ARZOLA  Primary Care Physician Unavailab  
Reny Navarrete Attending       Unavailable  
   
                                Reny Blanton Attending       Unavailable  
  
  
  
Allergies  
  
  
                                                    Allergy   
Classification                          Reported   
Allergen(s)               Allergy Type              Date of   
Onset                     Reaction(s)               Facility  
   
                                                      
(1 source)                              No Known   
Medication   
Allergies;   
Translations:   
[No Known   
Medication   
Allergies]                              Propensity to   
adverse   
reactions   
(disorder)                                                  [facility]   
Repository  
  
  
  
Medications  
Current Medications  
  
  
  
                      Medication Drug Class(es) Dates      Sig (Normalized) Sig   
(Original)  
   
                                                    amLODIPine 10 mg   
oral tablet  
(1 source)                              Dihydropyridine   
Calcium Channel   
Blocker                                 Start:   
2020                              take 10 mg by   
mouth once daily                        amlodipine 10 mg,   
Oral, Daily Start   
Date: 20   
Status: Ordered  
   
                                                    aspirin 81 mg oral   
tablet  
(1 source)                              Platelet Aggregation   
Inhibitor,   
Nonsteroidal   
Anti-inflammatory   
Drug                                    Start:   
2020                              take 1 tablet by   
mouth once daily                        aspirin 81 mg   
oral tablet 81 mg   
= 1 tab(s), Oral,   
Daily Start Date:   
20 Status:   
Ordered  
   
                                                    carbidopa 25 mg /   
levodopa 250 mg   
oral tablet  
(1 source)                              Aromatic Amino Acid   
Decarboxylation   
Inhibitor, Aromatic   
Amino Acid                              Start:   
2020                                          carbidopa-levodop  
a 25 mg-250 mg   
Tab 1 tab(s)   
Start Date:   
20 Status:   
Ordered  
   
                                                    clopidogrel 75 mg   
oral tablet  
(1 source)                              P2Y12 Platelet   
Inhibitor                               Start:   
2020                              take 1 tablet by   
mouth once daily                        clopidogrel 75 mg   
Tab 75 mg = 1   
tab(s), Oral,   
Daily Start Date:   
20 Status:   
Ordered  
   
                                                    metoprolol   
tartrate 50 mg   
oral tablet  
(1 source)                              beta-Adrenergic   
Blocker                                 Start:   
2020                              take 1 tablet by   
mouth once daily                        Metoprolol   
succinate 50 mg   
ER Tablet 50 mg,   
Oral, Daily Start   
Date: 20   
Status: Ordered  
   
                                                    Vitamin D3  
(1 source)                                          Start:   
2020                                          Vitamin D3 400   
[employer]  
t, Daily Start   
Date: 20   
Status: Ordered  
  
  
  
Completed/Discontinued Medications  
  
  
  
                      Medication Drug Class(es) Dates      Sig (Normalized) Sig   
(Original)  
   
                                                    ciprofloxacin 500 mg   
oral tablet  
(2 sources)                             Quinolone   
Antimicrobial                           Start:   
2019                              take 1 tablet by   
mouth once daily                        Cipro 500 mg Tab   
500 mg = 1   
tab(s), Oral,   
Daily, Take 1   
tablet the day   
before the   
procedure and 1   
tablet after the   
procedure, # 2   
tab(s),   
Refills(s) 0,   
Pharmacy: [address] Start Date:   
20 Status:   
Ordered  
  
  
  
Problems  
  
  
                                                    Problem   
Classification  Problem         Date            Documented Date Episodic/Chronic  
   
                                                    Acute cerebrovascular   
disease  
(1 source)                              Cerebrovascular   
accident                                2019          Chronic  
   
                                                    Cancer of prostate  
(1 source)                              History of malignant   
neoplasm of prostate                     2019          Episodic  
   
                                                    Disorders of lipid   
metabolism  
(1 source)      Hyperlipidemia                  2019      Chronic  
   
                                                    Essential   
hypertension  
(1 source)      Hypertensive disorder                 2019      Chronic  
   
                                                    Genitourinary   
symptoms and   
ill-defined   
conditions  
(1 source)      Microscopic hematuria                 2019      Episodic  
   
                                                    Hyperplasia of   
prostate  
(1 source)                              Benign prostatic   
hypertrophy with   
outflow obstruction                     2019          Chronic  
   
                                                    Other diseases of   
bladder and urethra  
(1 source)      Urethral stricture                 2019      Episodic  
   
                                                    Parkinson`s disease  
(1 source)      Parkinson's disease                 2020      Chronic  
   
                                                    Residual codes;   
unclassified  
(1 source)                              Pain, unspecified;   
Translations: [Pain,   
unspecified]                            Onset:   
2024                                          Episodic  
   
                                                    Residual codes;   
unclassified  
(1 source)                              Family history of   
prostate cancer                         2019          Episodic  
  
  
  
Results  
  
  
                          Test Name    Value        Interpretation Reference   
Range                                   Facility  
   
                                                    C Bloodon 2024   
   
                                        C Blood             Corynebacterium   
species   
(diptheroids)  
No KALEIGH performed on   
this organism  
Probable skin   
contamination  
Results called to   
Adalid Ortega on 2S  
at 730 by TB and   
results read back   
for confirmation on   
24       [facility]  
   
                                        Comment on above:   Performed By: #### 6  
[id number] ####[facility] (DEFAULT)[address]   
[place] [address]   
   
                                                    Outside Recordson 2024  
   
   
                                        Outside Records     [id number]  
[id number]#  
1.00OTGTIFF         [facility]  
   
                                        Outside Records     [id number]  
[id number]#  
1.00OTGTIFF         [facility]  
   
                                                    Coding Summaryon 2024   
   
                      Coding Summary            [facility] Bloodon 2024   
   
                      C Blood    No growth at 5 Days [facility]  
   
                                        Comment on above:   Performed By: #### 6  
[id number] ####[facility] (DEFAULT)[address]   
[place] [address]   
   
                                                    Coding Summaryon 2024   
   
                      Coding Summary            [facility]  
   
                                                    Consent Formson 2024   
   
                                        Consent Forms       [id number]  
[id number]#  
1.00OTGTIFF         [facility]  
   
                                                    Outside Recordson 2024  
   
   
                                        Outside Records     [id number]  
[id number]#  
1.00OTGTIFF         [facility]  
   
                                                    Provider Orderson 2024  
   
   
                                        Provider Orders     [id number]  
[id number]#  
1.00OTGTIFF         Normal                                  [facility]  
   
                                                    .Auto Diff 2024   
   
                      Auto Mono % 14 %       High       1-12       [facility]  
   
                                        Comment on above:   Performed By: #### 1  
[id number], [id number], [id number] ####[facility] (DEFAULT)[address] [place] [address]   
   
                      Baso Abs#  0.0 x10    Normal     0.0-0.2    [facility]  
   
                                        Comment on above:   Performed By: #### 1  
[id number], [id number], [id number] ####[facility] (DEFAULT)[address] [place] [address]   
   
                                                    Basophils/100 WBC   
(Bld)           0.6 %           Normal          0.2-2.0         [facility]  
   
                                        Comment on above:   Performed By: #### 1  
[id number], [id number], [id number] ####[facility] (DEFAULT)[address] [place] [address]   
   
                      Eos Abs#   0.2 x10    Normal     0.0-0.4    [facility]  
   
                                        Comment on above:   Performed By: #### 1  
[id number], [id number], [id number] ####[facility] (DEFAULT)[address] [place] [address]   
   
                                                    Eosinophils/100 WBC   
(Bld)           3.8 %           Normal          0.9-4.0         [facility]  
   
                                        Comment on above:   Performed By: #### 1  
[id number], [id number], [id number] ####[facility] (DEFAULT)[address] [place] [address]   
   
                      Lymph Abs# 0.9 x10    Low        1.3-2.9    [facility]  
   
                                        Comment on above:   Performed By: #### 1  
[id number], [id number], [id number] ####[facility] (DEFAULT)[address] [place] [address]   
   
                                                    Lymphocytes/100 WBC   
(Bld)           18 %            Normal          14-48           [facility]  
   
                                        Comment on above:   Performed By: #### 1  
[id number], [id number], [id number] ####[facility] (DEFAULT)[address] [place] [address]   
   
                      Mono Abs#  0.7 x10    Normal     0.0-0.8    [facility]  
   
                                        Comment on above:   Performed By: #### 1  
[id number], [id number], [id number] ####[facility] (DEFAULT)[address] [place] [address]   
   
                      Neut Abs#  3.3 x10    Normal     1.5-9.2    [facility]  
   
                                        Comment on above:   Performed By: #### 1  
[id number], [id number], [id number] ####[facility] (DEFAULT)[address] [place] [address]   
   
                                                    Neutrophils/100 WBC   
(Bld)           64 %            Normal          44-88           [facility]  
   
                                        Comment on above:   Performed By: #### 1  
[id number], 9311904, [id number] ####[facility] (DEFAULT)[address] [place] [address]   
   
                                                    BMP Standardon 2024   
   
                                eGFR Non AA     55 mL/min/1.73m2 Invalid   
Interpretation Code                                 [facility]  
   
                                        Comment on above:   Performed By: #### 1  
[id number], 7854838, [id number] ####[facility] (DEFAULT)[address] [place] [address]   
   
                                eGFR AA         >60             Invalid   
Interpretation Code                                 [facility]  
   
                                        Comment on above:   Performed By: #### 1  
[id number], [id number], [id number] ####[facility] (DEFAULT)[address] [place] [address]   
   
                                                    Anion gap   
[Moles/Vol]     11.1 mmol/L     Normal          5.0-19.0        [facility]  
   
                                        Comment on above:   Performed By: #### 1  
[id number], 5365057, [id number] ####[facility] (DEFAULT)[address] [place] [address]   
   
                      Calcium [Mass/Vol] 8.7 mg/dL  Low        8.9-10.3   [facility]  
   
                                        Comment on above:   Performed By: #### 1  
[id number], 6262090, [id number] ####[facility] (DEFAULT)[address] [place] [address]   
   
                      Chloride [Moles/Vol] 106 mmol/L Normal     101-111    [facility]  
   
                                        Comment on above:   Performed By: #### 1  
[id number], 7613931, 93320450 ####[facility] (DEFAULT)[address] [place] [address]   
   
                      CO2 [Moles/Vol] 28 mmol/L  Normal     21-32      [facility]  
   
                                        Comment on above:   Performed By: #### 1  
[id number], 6971415, 72247815 ####[facility] (DEFAULT)[address] [place] [address]   
   
                                                    Creatinine   
[Mass/Vol]      1.24 mg/dL      Normal          0.90-1.30       [facility]  
   
                                        Comment on above:   Performed By: #### 1  
[id number], 7305385, [id number] ####[facility] (DEFAULT)[address] [place] [address]   
   
                      Glucose [Mass/Vol] 117.0 mg/dL Normal     74.0-118.0 [facility]  
   
                                        Comment on above:   Performed By: #### 1  
[id number], 2117308, [id number] ####[facility] (DEFAULT)[address] [place] [address]   
   
                                Osmolality      288 mOsm/L      Invalid   
Interpretation Code                                 [facility]  
   
                                        Comment on above:   Performed By: #### 1  
[id number], 0760987, [id number] ####[facility] (DEFAULT)[address] [place] [address]   
   
                                                    Potassium   
[Moles/Vol]     4.1 mmol/L      Normal          3.6-5.1         [facility]  
   
                                        Comment on above:   Performed By: #### 1  
[id number], 0420431, [id number] ####[facility] (DEFAULT)[address] [place] [address]   
   
                      Sodium [Moles/Vol] 141.0 mmol/L Normal     136.0-144.0 [facility]  
   
                                        Comment on above:   Performed By: #### 1  
[id number], 9916678, 94651525 ####[facility] (DEFAULT)[address] [place] [address]   
   
                                                    Urea nitrogen   
[Mass/Vol]      28 mg/dL        High            8-26            [facility]  
   
                                        Comment on above:   Performed By: #### 1  
[id number], 5933159, [id number] ####[facility] (DEFAULT)[address] [place] [address]   
   
                                                    Urea   
nitrogen/Creatinine   
[Mass ratio]    22.5 mg/mg      High            4.6-16.2        [facility]  
   
                                        Comment on above:   Performed By: #### 1  
[id number], 8236438, [id number] ####[facility] (DEFAULT)[address] [place] [address]   
   
                                                    CBC w/ Auto Diffon   
4   
   
                                                    Erythrocyte   
distribution width   
(RBC) [Ratio]   15.5 %          High            11.5-15.0       [facility]  
   
                                        Comment on above:   Performed By: #### 1  
[id number], 5985404, [id number] ####[facility] (DEFAULT)[address] [place] [address]   
   
                                                    Hematocrit (Bld)   
[Volume fraction] 37.2 %          Normal          34.8-51.9       [facility]  
   
                                        Comment on above:   Performed By: #### 1  
[id number], 6733150, [id number] ####[facility] (DEFAULT)[address] [place] [address]   
   
                                                    Hemoglobin (Bld)   
[Mass/Vol]      12.5 g/dL       Normal          11.8-17.7       [facility]  
   
                                        Comment on above:   Performed By: #### 1  
[id number], 6574621, 98767105 ####[facility] (DEFAULT)[address] [place] [address]   
   
                                Man Diff?       Auto            Invalid   
Interpretation Code                                 [facility]  
   
                                        Comment on above:   Performed By: #### 1  
[id number], 7649959, [id number] ####[facility] (DEFAULT)[address] [place] [address]   
   
                                                    MCH (RBC) [Entitic   
mass]           30 pg           Normal          24-34           [facility]  
   
                                        Comment on above:   Performed By: #### 1  
[id number], 7507594, [id number] ####[facility] (DEFAULT)[address] [place] [address]   
   
                                                    MCHC (RBC)   
[Mass/Vol]      34 g/dL         Normal          26-37           [facility]  
   
                                        Comment on above:   Performed By: #### 1  
[id number], 2822829, 87867436 ####[facility] (DEFAULT)[address] [place] [address]   
   
                                                    MCV (RBC) [Entitic   
vol]            89 fL           Normal                    [facility]  
   
                                        Comment on above:   Performed By: #### 1  
[id number], 5544785, 78366956 ####[facility] (DEFAULT)[address] [place] [address]   
   
                      Platelet   177 x10    Normal     138-427    [facility]  
   
                                        Comment on above:   Performed By: #### 1  
[id number], 3533354, 85439859 ####[facility] (DEFAULT)[address] [place] [address]   
   
                                                    Platelet mean volume   
(Bld) [Entitic vol] 8.2 fL          Normal          6.3-10.2        [facility]  
   
                                        Comment on above:   Performed By: #### 1  
[id number], 1757146, [id number] ####[facility] (DEFAULT)[address] [place] [address]   
   
                      RBC        4.19 x10   Normal     3.70-5.30  [facility]  
   
                                        Comment on above:   Performed By: #### 1  
[id number], [id number], [id number] ####[facility] (DEFAULT)[address] [place] [address]   
   
                      WBC        5.2 x10    Normal     3.5-10.5   [facility]  
   
                                        Comment on above:   Performed By: #### 1  
[id number], [id number], [id number] ####[facility] (DEFAULT)[address] [place] [address]   
   
                                                    Discharge Noteon 2024   
   
                      Discharge Note            Normal                [facility]  
   
                                                    Education Noteon 2024   
   
                      Education Note            Normal                [facility]  
   
                                                    Inpatient Clinical Summaryon  
 2024   
   
                                                    Inpatient Clinical   
Summary                         Normal                          [facility]  
   
                                                    Inpatient Patient Summaryon   
2024   
   
                                                    Inpatient Patient   
Summary                         Normal                          [facility]  
   
                                                    Pharmacy Noteon 2024   
   
                      Pharmacy Note            Normal                [facility]  
   
                                                    Progress Note - Nurseon    
   
                                                    Progress Note -   
Nurse                                   report called to   
noemy pa at   
[place],   
[phone].  
[Electronically   
Signed on:   
2024 14:08   
EDT]  
____________________  
____________________  
Vandana Quijano RN  
  
  
  
  
[Verified on:   
2024 14:08   
EDT]  
____________________  
____________________  
Vandana Quijano RN       Normal                                  [facility]  
   
                                                    .Auto Diff 1on 2024   
   
                      Auto Mono % 10 %       Normal     1-12       [facility]  
   
                                        Comment on above:   Performed By: #### 1  
[id number], [id number], [id number] ####[facility] (DEFAULT)[address] [place] [address]   
   
                      Baso Abs#  0.0 x10    Normal     0.0-0.2    [facility]  
   
                                        Comment on above:   Performed By: #### 1  
[id number], [id number], [id number] ####[facility] (DEFAULT)[address] [place] [address]   
   
                                                    Basophils/100 WBC   
(Bld)           0.3 %           Normal          0.2-2.0         [facility]  
   
                                        Comment on above:   Performed By: #### 1  
0308451, [id number], 9902809 ####[facility] (DEFAULT)[address] [place] [address]   
   
                      Eos Abs#   0.1 x10    Normal     0.0-0.4    [facility]  
   
                                        Comment on above:   Performed By: #### 1  
[id number], [id number], 7197240 ####[facility] (DEFAULT)[address] [place] [address]   
   
                                                    Eosinophils/100 WBC   
(Bld)           1.4 %           Normal          0.9-4.0         [facility]  
   
                                        Comment on above:   Performed By: #### 1  
[id number], [id number], 5862997 ####[facility] (DEFAULT)[address] [place] [address]   
   
                      Lymph Abs# 1.0 x10    Low        1.3-2.9    [facility]  
   
                                        Comment on above:   Performed By: #### 1  
2786024, [id number], 1497903 ####[facility] (DEFAULT)[address] [place] [address]   
   
                                                    Lymphocytes/100 WBC   
(Bld)           14 %            Normal          14-48           [facility]  
   
                                        Comment on above:   Performed By: #### 1  
[id number], [id number], 0595282 ####[facility] (DEFAULT)[address] [place] [address]   
   
                      Mono Abs#  0.7 x10    Normal     0.0-0.8    [facility]  
   
                                        Comment on above:   Performed By: #### 1  
[id number], [id number], 5719661 ####[facility] (DEFAULT)[address] [place] [address]   
   
                      Neut Abs#  5.5 x10    Normal     1.5-9.2    [facility]  
   
                                        Comment on above:   Performed By: #### 1  
5594093, [id number], 7291991 ####[facility] (DEFAULT)[address] [place] [address]   
   
                                                    Neutrophils/100 WBC   
(Bld)           75 %            Normal          44-88           [facility]  
   
                                        Comment on above:   Performed By: #### 1  
[id number], [id number], 5931807 ####[facility] (DEFAULT)[address] [place] [address] Standardon 2024   
   
                                eGFR Non AA     56 mL/min/1.73m2 Invalid   
Interpretation Code                                 [facility]  
   
                                        Comment on above:   Performed By: #### 1  
[id number], [id number], 0821939 ####[facility] (DEFAULT)[address] [place] [address]   
   
                                eGFR AA         >60             Invalid   
Interpretation Code                                 [facility]  
   
                                        Comment on above:   Performed By: #### 1  
[id number], [id number], 9630468 ####[facility] (DEFAULT)[address] [place] [address]   
   
                                                    Anion gap   
[Moles/Vol]     10.0 mmol/L     Normal          5.0-19.0        [facility]  
   
                                        Comment on above:   Performed By: #### 1  
4536703, [id number], 6442798 ####[facility] (DEFAULT)[address] [place] [address]   
   
                      Calcium [Mass/Vol] 9.1 mg/dL  Normal     8.9-10.3   [facility]  
   
                                        Comment on above:   Performed By: #### 1  
9967172, [id number], 7670809 ####[facility] (DEFAULT)[address] [place] [address]   
   
                      Chloride [Moles/Vol] 107 mmol/L Normal     101-111    [facility]  
   
                                        Comment on above:   Performed By: #### 1  
7790733, [id number], 9406767 ####[facility] (DEFAULT)[address] [place] [address]   
   
                      CO2 [Moles/Vol] 25 mmol/L  Normal     21-32      [facility]  
   
                                        Comment on above:   Performed By: #### 1  
[id number], [id number], 3057837 ####[facility] (DEFAULT)[address] [place] [address]   
   
                                                    Creatinine   
[Mass/Vol]      1.23 mg/dL      Normal          0.90-1.30       [facility]  
   
                                        Comment on above:   Performed By: #### 1  
4151368, [id number], 8672477 ####[facility] (DEFAULT)[address] [place] [address]   
   
                      Glucose [Mass/Vol] 126.0 mg/dL High       74.0-118.0 [facility]  
   
                                        Comment on above:   Performed By: #### 1  
2747842, [id number], 9858223 ####[facility] (DEFAULT)[address] [place] [address]   
   
                                Osmolality      282 mOsm/L      Invalid   
Interpretation Code                                 [facility]  
   
                                        Comment on above:   Performed By: #### 1  
[id number], [id number], 4766377 ####[facility] (DEFAULT)[address] [place] [address]   
   
                                                    Potassium   
[Moles/Vol]     4.0 mmol/L      Normal          3.6-5.1         [facility]  
   
                                        Comment on above:   Performed By: #### 1  
2157516, [id number], 3041338 ####[facility] (DEFAULT)[address] [place] [address]   
   
                      Sodium [Moles/Vol] 138.0 mmol/L Normal     136.0-144.0 [facility]  
   
                                        Comment on above:   Performed By: #### 1  
6022353, [id number], 1972794 ####[facility] (DEFAULT)[address] [place] [address]   
   
                                                    Urea nitrogen   
[Mass/Vol]      26 mg/dL        Normal          8-26            [facility]  
   
                                        Comment on above:   Performed By: #### 1  
8094513, [id number], 6636302 ####[facility] (DEFAULT)[address] [place] [address]   
   
                                                    Urea   
nitrogen/Creatinine   
[Mass ratio]    21.1 mg/mg      High            4.6-16.2        [facility]  
   
                                        Comment on above:   Performed By: #### 1  
4636043, [id number], 0866307 ####[facility] (DEFAULT)[address] [place] [address]   
   
                                                    CBC w/ Auto Diffon    
   
                                                    Erythrocyte   
distribution width   
(RBC) [Ratio]   15.2 %          High            11.5-15.0       [facility]  
   
                                        Comment on above:   Performed By: #### 1  
[id number], [id number], 3130328 ####[facility] (DEFAULT)[address] [place] [address]   
   
                                                    Hematocrit (Bld)   
[Volume fraction] 39.0 %          Normal          34.8-51.9       [facility]  
   
                                        Comment on above:   Performed By: #### 1  
[id number], [id number], 8462249 ####[facility] (DEFAULT)[address] [place] [address]   
   
                                                    Hemoglobin (Bld)   
[Mass/Vol]      13.1 g/dL       Normal          11.8-17.7       [facility]  
   
                                        Comment on above:   Performed By: #### 1  
[id number], [id number], 1997064 ####[facility] (DEFAULT)[address] [place] [address]   
   
                                Man Diff?       Auto            Invalid   
Interpretation Code                                 [facility]  
   
                                        Comment on above:   Performed By: #### 1  
8883974, [id number], 3809560 ####[facility] (DEFAULT)[address] [place], [place] [address]   
   
                                                    MCH (RBC) [Entitic   
mass]           30 pg           Normal          24-34           [facility]  
   
                                        Comment on above:   Performed By: #### 1  
6000885, [id number], 0596489 ####[facility] (DEFAULT)[address] [place] [address]   
   
                                                    MCHC (RBC)   
[Mass/Vol]      34 g/dL         Normal          26-37           [facility]  
   
                                        Comment on above:   Performed By: #### 1  
1995547, [id number], 2087692 ####[facility] (DEFAULT)[address] [place] [address]   
   
                                                    MCV (RBC) [Entitic   
vol]            90 fL           Normal                    [facility]  
   
                                        Comment on above:   Performed By: #### 1  
8127420, [id number], 7434420 ####[facility] (DEFAULT)[address] [place] [address]   
   
                      Platelet   187 x10    Normal     138-427    [facility]  
   
                                        Comment on above:   Performed By: #### 1  
[id number], [id number], 8796905 ####[facility] (DEFAULT)[address] [place] [address]   
   
                                                    Platelet mean volume   
(Bld) [Entitic vol] 8.1 fL          Normal          6.3-10.2        [facility]  
   
                                        Comment on above:   Performed By: #### 1  
[id number], [id number], 9647283 ####[facility] (DEFAULT)[address] [place] [address]   
   
                      RBC        4.35 x10   Normal     3.70-5.30  [facility]  
   
                                        Comment on above:   Performed By: #### 1  
1413822, [id number], 4402429 ####[facility] (DEFAULT)[address] [place] [address]   
   
                      WBC        7.3 x10    Normal     3.5-10.5   [facility]  
   
                                        Comment on above:   Performed By: #### 1  
1405758, [id number], 1774956 ####[facility] (DEFAULT)[address] [place] [address]   
   
                                                    Nutrition Noteon 2024   
   
                                        Nutrition Note      note po intake has   
been good - 100%   
most meals; will   
continue to monitor   
po and assist with   
any changes prn. ts Normal                                  [facility]  
   
                                                    RPR, Rfx Qn RPR/Confirm TP L  
Con 2024   
   
                                RPR LC          Non-Reactive    Invalid   
Interpretation Code       Non Reactive              [facility]  
   
                                        Comment on above:   Result Comment: Perf  
ormed At:  [facility] [id number] [place], [place] [id number] Vincent PhD Ph:[phone]   
   
                                                            Performed By: #### 1  
[id number], [id number], 2927048, 7287080,   
[id number] ####[facility] (DEFAULT)[address] [place] [address]   
   
                                                    .Auto Diff 2024   
   
                      Auto Mono % 9 %        Normal            [facility]  
   
                                        Comment on above:   Performed By: #### 1  
0530392, [id number], 7701603, 3014777,   
[id number] ####[facility] (DEFAULT)[address] [place] [address]   
   
                      Baso Abs#  0.0 x10    Normal     0.0-0.2    [facility]  
   
                                        Comment on above:   Performed By: #### 1  
3663466, [id number], 2964000, 6668039,   
[id number] ####[facility] (DEFAULT)[address] [place] [address]   
   
                                                    Basophils/100 WBC   
(Bld)           0.4 %           Normal          0.2-2.0         [facility]  
   
                                        Comment on above:   Performed By: #### 1  
8365689, [id number], 6350534, 3826389,   
[id number] ####[facility] (DEFAULT)[address] [place] [address]   
   
                      Eos Abs#   0.1 x10    Normal     0.0-0.4    [facility]  
   
                                        Comment on above:   Performed By: #### 1  
[id number], [id number], 1164531, 7602762,   
[id number] ####[facility] (DEFAULT)[address] [place] [address]   
   
                                                    Eosinophils/100 WBC   
(Bld)           1.6 %           Normal          0.9-4.0         [facility]  
   
                                        Comment on above:   Performed By: #### 1  
[id number], [id number], 5730550, 2015625,   
[id number] ####[facility] (DEFAULT)[address] [place] [address]   
   
                      Lymph Abs# 0.7 x10    Low        1.3-2.9    [facility]  
   
                                        Comment on above:   Performed By: #### 1  
8966993, 5363984444, 3699548, 2074425,   
[id number] ####[facility] (DEFAULT)[address] [place] [address]   
   
                                                    Lymphocytes/100 WBC   
(Bld)           12 %            Low             14-48           [facility]  
   
                                        Comment on above:   Performed By: #### 1  
6927638, [id number], 1686381, 6795332,   
[id number] ####[facility] (DEFAULT)[address] [place] [address]   
   
                      Mono Abs#  0.5 x10    Normal     0.0-0.8    [facility]  
   
                                        Comment on above:   Performed By: #### 1  
6850403, 0962419381, 5427159, 3701156,   
[id number] ####[facility] (DEFAULT)[address] [place] [address]   
   
                      Neut Abs#  4.7 x10    Normal     1.5-9.2    [facility]  
   
                                        Comment on above:   Performed By: #### 1  
5331604, [id number], 0711256, 5577446,   
[id number] ####[facility] (DEFAULT)[address] [place] [address]   
   
                                                    Neutrophils/100 WBC   
(Bld)           77 %            Normal          44-88           [facility]  
   
                                        Comment on above:   Performed By: #### 1  
6766601, [id number], 9443525, 3240381,   
[id number] ####[facility] (DEFAULT)[address] [place] [address]   
   
                                                    BMP Standardon 2024   
   
                                eGFR Non AA     >60             Invalid   
Interpretation Code                                 [facility]  
   
                                        Comment on above:   Performed By: #### 1  
5488202, [id number], 6832119, 5220041,   
[id number] ####[facility] (DEFAULT)[address] [place] [address]   
   
                                eGFR AA         >60             Invalid   
Interpretation Code                                 [facility]  
   
                                        Comment on above:   Performed By: #### 1  
0103475, [id number], 4622371, 4864480,   
[id number] ####[facility] (DEFAULT)[address] [place] [address]   
   
                                                    Anion gap   
[Moles/Vol]     10.9 mmol/L     Normal          5.0-19.0        [facility]  
   
                                        Comment on above:   Performed By: #### 1  
7959130, [id number], 4116295, 5037527,   
[id number] ####[facility] (DEFAULT)[address] [place] [address]   
   
                      Calcium [Mass/Vol] 8.8 mg/dL  Low        8.9-10.3   [facility]  
   
                                        Comment on above:   Performed By: #### 1  
9119554, 9901085806, 2898346, 5665292,   
[id number] ####[facility] (DEFAULT)[address] [place] [address]   
   
                      Chloride [Moles/Vol] 108 mmol/L Normal     101-111    [facility]  
   
                                        Comment on above:   Performed By: #### 1  
9841791, 2151385727, 5745721, 6470928,   
[id number] ####[facility] (DEFAULT)[address] [place] [address]   
   
                      CO2 [Moles/Vol] 25 mmol/L  Normal     21-32      [facility]  
   
                                        Comment on above:   Performed By: #### 1  
4739138, 9387060832, 7203354, 4836676,   
[id number] ####[facility] (DEFAULT)[address] [place] [address]   
   
                                                    Creatinine   
[Mass/Vol]      1.11 mg/dL      Normal          0.90-1.30       [facility]  
   
                                        Comment on above:   Performed By: #### 1  
2035638, [id number], 8127896, 8028123,   
[id number] ####[facility] (DEFAULT)[address] [place] [address]   
   
                      Glucose [Mass/Vol] 123.0 mg/dL High       74.0-118.0 [facility]  
   
                                        Comment on above:   Performed By: #### 1  
1764627, [id number], 3370550, 9390945,   
[id number] ####[facility] (DEFAULT)[address] [place] [address]   
   
                                Osmolality      286 mOsm/L      Invalid   
Interpretation Code                                 [facility]  
   
                                        Comment on above:   Performed By: #### 1  
8435950, [id number], 5651994, 1443730,   
[id number] ####[facility] (DEFAULT)[address] [place] [address]   
   
                                                    Potassium   
[Moles/Vol]     3.9 mmol/L      Normal          3.6-5.1         [facility]  
   
                                        Comment on above:   Performed By: #### 1  
0647530, [id number], 4090173, 8283414,   
[id number] ####[facility] (DEFAULT)[address] [place] [address]   
   
                      Sodium [Moles/Vol] 140.0 mmol/L Normal     136.0-144.0 [facility]  
   
                                        Comment on above:   Performed By: #### 1  
5702408, [id number], 3639056, 1566091,   
[id number] ####[facility] (DEFAULT)[address] [place] [address]   
   
                                                    Urea nitrogen   
[Mass/Vol]      27 mg/dL        High            8-26            [facility]  
   
                                        Comment on above:   Performed By: #### 1  
6101844, [id number], 0043878, 5320655,   
[id number] ####[facility] (DEFAULT)[address] [place] [address]   
   
                                                    Urea   
nitrogen/Creatinine   
[Mass ratio]    24.3 mg/mg      High            4.6-16.2        [facility]  
   
                                        Comment on above:   Performed By: #### 1  
[id number], [id number], 6110970, 5605446,   
[id number] ####[facility] (DEFAULT)[address] [place] [address]   
   
                                                    CBC w/ Auto Diffon   
4   
   
                                                    Erythrocyte   
distribution width   
(RBC) [Ratio]   14.9 %          Normal          11.5-15.0       [facility]  
   
                                        Comment on above:   Performed By: #### 1  
[id number], [id number], 1267365, 1043240,   
[id number] ####[facility] (DEFAULT)[address] [place] [address]   
   
                                                    Hematocrit (Bld)   
[Volume fraction] 39.2 %          Normal          34.8-51.9       [facility]  
   
                                        Comment on above:   Performed By: #### 1  
[id number], [id number], 7762521, 7127365,   
[id number] ####[facility] (DEFAULT)[address] [place] [address]   
   
                                                    Hemoglobin (Bld)   
[Mass/Vol]      13.2 g/dL       Normal          11.8-17.7       [facility]  
   
                                        Comment on above:   Performed By: #### 1  
[id number], [id number], 0894748, 9184981,   
[id number] ####[facility] (DEFAULT)[address] [place] [address]   
   
                                Man Diff?       Auto            Invalid   
Interpretation Code                                 [facility]  
   
                                        Comment on above:   Performed By: #### 1  
[id number], [id number], 1224734, 6818674,   
[id number] ####[facility] (DEFAULT)[address] [place] [address]   
   
                                                    MCH (RBC) [Entitic   
mass]           30 pg           Normal          24-34           [facility]  
   
                                        Comment on above:   Performed By: #### 1  
[id number], [id number], 1035747, 3267530,   
[id number] ####[facility] (DEFAULT)[address] [place] [address]   
   
                                                    MCHC (RBC)   
[Mass/Vol]      34 g/dL         Normal          26-37           [facility]  
   
                                        Comment on above:   Performed By: #### 1  
[id number], [id number], 1589600, 0661210,   
[id number] ####[facility] (DEFAULT)[address] [place] [address]   
   
                                                    MCV (RBC) [Entitic   
vol]            88 fL           Normal                    [facility]  
   
                                        Comment on above:   Performed By: #### 1  
[id number], 1281372928, 2758065, 5699112,   
[id number] ####[facility] (DEFAULT)[address] [place] [address]   
   
                      Platelet   181 x10    Normal     138-427    [facility]  
   
                                        Comment on above:   Performed By: #### 1  
7818928, 0540474315, 2771939, 9493509,   
[id number] ####[facility] (DEFAULT)[address] [place] [address]   
   
                                                    Platelet mean volume   
(Bld) [Entitic vol] 8.1 fL          Normal          6.3-10.2        [facility]  
   
                                        Comment on above:   Performed By: #### 1  
[id number], [id number], 0967735, 3323501,   
[id number] ####[facility] (DEFAULT)[address] [place] [address]   
   
                      RBC        4.46 x10   Normal     3.70-5.30  [facility]  
   
                                        Comment on above:   Performed By: #### 1  
3951460, 4042903003, 7616340, 8143059,   
[id number] ####[facility] (DEFAULT)[address] [place] [address]   
   
                      WBC        6.0 x10    Normal     3.5-10.5   [facility]  
   
                                        Comment on above:   Performed By: #### 1  
2478415, 5012701586, 9153204, 9840870,   
[id number] ####[facility] (DEFAULT)[address] [place] [address]   
   
                                                    CRPon 2024   
   
                      CRP [Mass/Vol] mg/L       Normal     <=0.5      [facility]  
   
                                        Comment on above:   Performed By: #### 1  
[id number], 1968611534, 0615578, 3144462,   
[id number] ####[facility] (DEFAULT)[address] [place] [address]   
   
                                                    .Auto Diff 1on 2024   
   
                      Auto Mono % 10 %       Normal     1-12       [facility]  
   
                                        Comment on above:   Performed By: #### 1  
[id number], 95964050, 4774176 ####[facility] (DEFAULT)[address] [place] [address]   
   
                      Baso Abs#  0.0 x10    Normal     0.0-0.2    [facility]  
   
                                        Comment on above:   Performed By: #### 1  
[id number], 34335952, 1169556 ####[facility] (DEFAULT)[address] [place] [address]   
   
                                                    Basophils/100 WBC   
(Bld)           0.8 %           Normal          0.2-2.0         [facility]  
   
                                        Comment on above:   Performed By: #### 1  
[id number], 60602910, 3139739 ####[facility] (DEFAULT)[address] [place] [address]   
   
                      Eos Abs#   0.1 x10    Normal     0.0-0.4    [facility]  
   
                                        Comment on above:   Performed By: #### 1  
[id number], 16473385, 4677736 ####[facility] (DEFAULT)[address] [place] [address]   
   
                                                    Eosinophils/100 WBC   
(Bld)           2.4 %           Normal          0.9-4.0         [facility]  
   
                                        Comment on above:   Performed By: #### 1  
[id number], 06284291, 8746044 ####[facility] (DEFAULT)[address] [place] [address]   
   
                      Lymph Abs# 0.9 x10    Low        1.3-2.9    [facility]  
   
                                        Comment on above:   Performed By: #### 1  
[id number], 89908864, 0186428 ####[facility] (DEFAULT)[address] [place] [address]   
   
                                                    Lymphocytes/100 WBC   
(Bld)           17 %            Normal          14-48           [facility]  
   
                                        Comment on above:   Performed By: #### 1  
[id number], 47845877, 7706779 ####[facility] (DEFAULT)[address] [place] [address]   
   
                      Mono Abs#  0.5 x10    Normal     0.0-0.8    [facility]  
   
                                        Comment on above:   Performed By: #### 1  
[id number], 13014955, 7058017 ####[facility] (DEFAULT)[address] [place] [address]   
   
                      Neut Abs#  3.7 x10    Normal     1.5-9.2    [facility]  
   
                                        Comment on above:   Performed By: #### 1  
[id number], 00159097, 2112327 ####[facility] (DEFAULT)[address] [place] [address]   
   
                                                    Neutrophils/100 WBC   
(Bld)           70 %            Normal          44-88           [facility]  
   
                                        Comment on above:   Performed By: #### 1  
[id number], 47545238, 0119435 ####[facility] (DEFAULT)[address] [place] [address]   
   
                                                    BMP Standardon 2024   
   
                                eGFR Non AA     57 mL/min/1.73m2 Invalid   
Interpretation Code                                 [facility]  
   
                                        Comment on above:   Performed By: #### 1  
[id number], 16194128, 9869504 ####[facility] (DEFAULT)[address] [place] [address]   
   
                                eGFR AA         >60             Invalid   
Interpretation Code                                 [facility]  
   
                                        Comment on above:   Performed By: #### 1  
[id number], 61263864, 2971241 ####[facility] (DEFAULT)[address] [place] [address]   
   
                                                    Anion gap   
[Moles/Vol]     9.0 mmol/L      Normal          5.0-19.0        [facility]  
   
                                        Comment on above:   Performed By: #### 1  
[id number], 04019407, 0672583 ####[facility] (DEFAULT)[address] [place] [address]   
   
                      Calcium [Mass/Vol] 8.6 mg/dL  Low        8.9-10.3   [facility]  
   
                                        Comment on above:   Performed By: #### 1  
[id number], 44894124, 5669118 ####[facility] (DEFAULT)[address] [place] [address]   
   
                      Chloride [Moles/Vol] 110 mmol/L Normal     101-111    [facility]  
   
                                        Comment on above:   Performed By: #### 1  
[id number], 79198271, 3892396 ####[facility] (DEFAULT)[address] [place] [address]   
   
                      CO2 [Moles/Vol] 25 mmol/L  Normal     21-32      [facility]  
   
                                        Comment on above:   Performed By: #### 1  
[id number], 38427201, 1262717 ####[facility] (DEFAULT)[address] [place] [address]   
   
                                                    Creatinine   
[Mass/Vol]      1.20 mg/dL      Normal          0.90-1.30       [facility]  
   
                                        Comment on above:   Performed By: #### 1  
507266352, 15257973, 6061903 ####[facility] (DEFAULT)[address] [place] [address]   
   
                      Glucose [Mass/Vol] 116.0 mg/dL Normal     74.0-118.0 [facility]  
   
                                        Comment on above:   Performed By: #### 1  
[id number], 88818264, 4481088 ####[facility] (DEFAULT)[address] [place] [address]   
   
                                Osmolality      285 mOsm/L      Invalid   
Interpretation Code                                 [facility]  
   
                                        Comment on above:   Performed By: #### 1  
[id number], 76473946, 4004627 ####[facility] (DEFAULT)[address] [place] [address]   
   
                                                    Potassium   
[Moles/Vol]     4.0 mmol/L      Normal          3.6-5.1         [facility]  
   
                                        Comment on above:   Performed By: #### 1  
[id number], 87699094, 8345654 ####[facility] (DEFAULT)[address] [place] [address]   
   
                      Sodium [Moles/Vol] 140.0 mmol/L Normal     136.0-144.0 [facility]  
   
                                        Comment on above:   Performed By: #### 1  
[id number], 63757997, 7608124 ####[facility] (DEFAULT)[address] [place] [address]   
   
                                                    Urea nitrogen   
[Mass/Vol]      27 mg/dL        High            -            [facility]  
   
                                        Comment on above:   Performed By: #### 1  
[id number], 24380159, 4909912 ####[facility] (DEFAULT)[address] [place] [address]   
   
                                                    Urea   
nitrogen/Creatinine   
[Mass ratio]    22.5 mg/mg      High            4.6-16.2        [facility]  
   
                                        Comment on above:   Performed By: #### 1  
[id number], 88231418, 8835131 ####[facility] (DEFAULT)[address] [place] [address]   
   
                                                    CBC w/ Auto Diffon   
4   
   
                                                    Erythrocyte   
distribution width   
(RBC) [Ratio]   14.8 %          Normal          11.5-15.0       [facility]  
   
                                        Comment on above:   Performed By: #### 1  
[id number], 67144584, 6301046 ####[facility] (DEFAULT)[address] [place] [address]   
   
                                                    Hematocrit (Bld)   
[Volume fraction] 39.1 %          Normal          34.8-51.9       [facility]  
   
                                        Comment on above:   Performed By: #### 1  
[id number], 72421026, 2733842 ####[facility] (DEFAULT)[address] [place] [address]   
   
                                                    Hemoglobin (Bld)   
[Mass/Vol]      12.9 g/dL       Normal          11.8-17.7       [facility]  
   
                                        Comment on above:   Performed By: #### 1  
[id number], 22355987, 2247473 ####[facility] (DEFAULT)[address] [place] [address]   
   
                                Man Diff?       Auto            Invalid   
Interpretation Code                                 [facility]  
   
                                        Comment on above:   Performed By: #### 1  
[id number], 24198793, 1886282 ####[facility] (DEFAULT)[address] [place] [address]   
   
                                                    MCH (RBC) [Entitic   
mass]           30 pg           Normal          24-34           [facility]  
   
                                        Comment on above:   Performed By: #### 1  
[id number], 76761999, 4299555 ####[facility] (DEFAULT)[address] [place] [address]   
   
                                                    MCHC (RBC)   
[Mass/Vol]      33 g/dL         Normal          26-37           [facility]  
   
                                        Comment on above:   Performed By: #### 1  
[id number], 61119570, 0985895 ####[facility] (DEFAULT)[address] [place] [address]   
   
                                                    MCV (RBC) [Entitic   
vol]            89 fL           Normal                    [facility]  
   
                                        Comment on above:   Performed By: #### 1  
[id number], 62862710, 9241284 ####[facility] (DEFAULT)[address] [place] [address]   
   
                      Platelet   177 x10    Normal     138-427    [facility]  
   
                                        Comment on above:   Performed By: #### 1  
[id number], 86628545, 5429856 ####[facility] (DEFAULT)[address] [place] [address]   
   
                                                    Platelet mean volume   
(Bld) [Entitic vol] 8.2 fL          Normal          6.3-10.2        [facility]  
   
                                        Comment on above:   Performed By: #### 1  
[id number], 21534236, 4934093 ####[facility] (DEFAULT)[address] [place] [address]   
   
                      RBC        4.38 x10   Normal     3.70-5.30  [facility]  
   
                                        Comment on above:   Performed By: #### 1  
125125639, 88693051, 8024807 ####[facility] (DEFAULT)[address] [place] [address]   
   
                      WBC        5.4 x10    Normal     3.5-10.5   [facility]  
   
                                        Comment on above:   Performed By: #### 1  
[id number], 55363296, 9354595 ####[facility] (DEFAULT)[address] [place] [address]   
   
                                                    .Auto Diff 1on -   
   
                      Auto Mono % 7 %        Normal     1-12       [facility]  
   
                                        Comment on above:   Performed By: #### 5  
[id number], 50683109, 3119550527,   
4282077659,   
2258780891, 2014440, 2693290970, 6338226598, 6855998, 6239516   
####[facility] (DEFAULT)[address] [place] [address]   
   
                      Baso Abs#  0.0 x10    Normal     0.0-0.2    [facility]  
   
                                        Comment on above:   Performed By: #### 5  
[id number], 14218766, 3178378344,   
1314488657,   
0864102232, 7666572, 8706117685, 2285748197, 7850377, 1100353   
####[facility] (DEFAULT)[address] [place] [address]   
[address]   
   
                                                    Basophils/100 WBC   
(Bld)           0.6 %           Normal          0.2-2.0         [facility]  
   
                                        Comment on above:   Performed By: #### 5  
[id number], 08650601, 5018874660,   
2123863744,   
1046763160, 8319819, 0133384221, 2024728864, 6360673, 9549860   
####[facility] (DEFAULT)[address] [place] [address]   
[address]   
   
                      Eos Abs#   0.1 x10    Normal     0.0-0.4    [facility]  
   
                                        Comment on above:   Performed By: #### 5  
670487825, 78460113, 1683454308,   
3611989556,   
5815554473, 1425376, , 5267548793, 2652299, 3868163   
####[facility] (DEFAULT)[address] [place] [address]   
[address]   
   
                                                    Eosinophils/100 WBC   
(Bld)           2.9 %           Normal          0.9-4.0         [facility]  
   
                                        Comment on above:   Performed By: #### 5  
[id number], 28616738, 7811104571,   
0675685722,   
7356663449, , , 9224261763, 6272709, 8487959   
####[facility] (DEFAULT)[address] [place] [address]   
[address]   
   
                      Lymph Abs# 0.8 x10    Low        1.3-2.9    [facility]  
   
                                        Comment on above:   Performed By: #### 5  
119032286, 10144431, 3433973151,   
7805104923,   
5073397385, , , 2213037441, 5951170, 7086975   
####[facility] (DEFAULT)[address] [place] [address]   
[address]   
   
                                                    Lymphocytes/100 WBC   
(Bld)           17 %            Normal          14-48           [facility]  
   
                                        Comment on above:   Performed By: #### 5  
[id number], 75788867, 8988417158,   
3236291899,   
1180206458, , 8633241022, 0330396395, 6729120, 2304041   
####[facility] (DEFAULT)[address] [place] [address]   
[address]   
   
                      Mono Abs#  0.3 x10    Normal     0.0-0.8    [facility]  
   
                                        Comment on above:   Performed By: #### 5  
[id number], 71844565, 0084441640,   
7527303143,   
0215681594, 3819717, 5170071784, 5713110555, 3882931, 8368033   
####[facility] (DEFAULT)[address] [place]   
[address]   
   
                      Neut Abs#  3.4 x10    Normal     1.5-9.2    [facility]  
   
                                        Comment on above:   Performed By: #### 5  
[id number], 12591063, 3253068116,   
0567306814,   
0729224617, 5504320, 8467800987, 9633202252, 9187691, 2865532   
####[facility] (DEFAULT)[address] [place], [place] [address]   
   
                                                    Neutrophils/100 WBC   
(Bld)           73 %            Normal          44-88           [facility]  
   
                                        Comment on above:   Performed By: #### 5  
[id number], 88505664, 0444406354,   
5899639415,   
1134611176, 1463457, 6506985242, 7639403357, 3903237, 2212045   
####[facility] (DEFAULT)[address] [place] [address]   
[address]   
   
                                                    Ammoniaon 2024   
   
                                                    Ammonia (P)   
[Moles/Vol]     15.0 umol/L     Normal          10.0-35.0       [facility]  
   
                                        Comment on above:   Performed By: #### 6  
[id number], 7508434, 6449034, 7514311,   
5685379   
####[facility] (DEFAULT)[address] [place] [address]   
[address]   
   
                                                    BNP.on 2024   
   
                                                    Natriuretic peptide   
B (Bld) [Mass/Vol] 69.0 pg/mL      Normal          0.0-100.0       [facility]  
   
                                        Comment on above:   Result Comment: BNP   
results greater than 100 pg/mL are   
considered   
abnormal and suggestive of patients with CHF. Higher BNP   
concentrations measured in the first 72 hours after an acute   
coronary syndorme are associated with an increased risk of death,   
myocardial infarction, and CHF.   
   
                                                            Performed By: #### 5  
[id number], 36929614, 3835333841, 3702532423,   
3369578099, 8325953, 3154000020, 2073189621, 6799025, 1001364   
####[facility] (DEFAULT)[address] [place] [address]   
[address]   
   
                                                    CBC w/ Auto Diffon    
   
                                                    Erythrocyte   
distribution width   
(RBC) [Ratio]   15.3 %          High            11.5-15.0       [facility]  
   
                                        Comment on above:   Performed By: #### 5  
[id number], 52815861, 9719494499,   
3006630992,   
3654501207, 2318943, 0441177052, 5065116159, 5863500, 7473686   
####[facility] (DEFAULT)[address] [place] [address]   
[address]   
   
                                                    Hematocrit (Bld)   
[Volume fraction] 41.7 %          Normal          34.8-51.9       [facility]  
   
                                        Comment on above:   Performed By: #### 5  
[id number], 21080729, 3913280218,   
3112838875,   
2997268085, 3666606, 4614281435, 7415398539, 7487730, 0879199   
####[facility] (DEFAULT)[address] [place] [address]   
[address]   
   
                                                    Hemoglobin (Bld)   
[Mass/Vol]      13.8 g/dL       Normal          11.8-17.7       [facility]  
   
                                        Comment on above:   Performed By: #### 5  
[id number], 13876107, 6904641664,   
7554925823,   
8025640194, 0931173, 8610614922, 3403378398, 5712232, 0909609   
####[facility] (DEFAULT)[address] [place] [address]   
[address]   
   
                                Man Diff?       Auto            Invalid   
Interpretation Code                                 [facility]  
   
                                        Comment on above:   Performed By: #### 5  
[id number], 87728167, 1872607651,   
6679205907,   
0739648895, 8818200, 4517420055, 9434921093, 2004198, 4121567   
####[facility] (DEFAULT)[address] [place] [address]   
[address]   
   
                                                    MCH (RBC) [Entitic   
mass]           30 pg           Normal          24-34           [facility]  
   
                                        Comment on above:   Performed By: #### 5  
[id number], 47320794, 6750113350,   
3660477002,   
8634176558, 5787811, 9234802491, 6734563171, 8931180, 6817354   
####[facility] (DEFAULT)[address] [place] [address]   
[address]   
   
                                                    MCHC (RBC)   
[Mass/Vol]      33 g/dL         Normal          26-37           [facility]  
   
                                        Comment on above:   Performed By: #### 5  
[id number], 34838364, 8626896019,   
0348311309,   
0274217249, 8624271, 4607455710, 9603501968, 6707146, 8393461   
####[facility] (DEFAULT)[address] [place] [address]   
   
                                                    MCV (RBC) [Entitic   
vol]            90 fL           Normal                    [facility]  
   
                                        Comment on above:   Performed By: #### 5  
[id number], 77148607, 4478777749,   
6836857260,   
0655661477, 1266678, 5112983892, 4288821837, 1350250, 0141123   
####[facility] (DEFAULT)[address] [place] [address]   
   
                      Platelet   190 x10    Normal     138-427    [facility]  
   
                                        Comment on above:   Performed By: #### 5  
[id number], 06977814, 6661694921,   
8897621230,   
3799789719, 8162928, 1761182443, 9590872316, 7591066, 2423768   
####[facility] (DEFAULT)[address] [place] [address]   
[address]   
   
                                                    Platelet mean volume   
(Bld) [Entitic vol] 8.0 fL          Normal          6.3-10.2        [facility]  
   
                                        Comment on above:   Performed By: #### 5  
615594220, 61215127, 6990158003,   
9887556455,   
0618993455, 3232534, 5322326542, 5953185410, 2173468, 4218360   
####[facility] (DEFAULT)[address] [place] [address]   
   
                      RBC        4.63 x10   Normal     3.70-5.30  [facility]  
   
                                        Comment on above:   Performed By: #### 5  
[id number], 42141973, 8500270220,   
0478472223,   
4781848099, 1054361, 5361922602, 4511773271, 7856729, 1755148   
####[facility] (DEFAULT)[address] [place] [address]   
[address]   
   
                      WBC        4.7 x10    Normal     3.5-10.5   [facility]  
   
                                        Comment on above:   Performed By: #### 5  
509184686, 46161361, 6766564741,   
9863761573,   
5728122432, 4949103, 7092834443, 6728102863, 1153774, 2695861   
####[facility] (DEFAULT)[address] [place] [address]   
[address]   
   
                                                    [facility] Standardon 2024   
   
                                eGFR Non AA     59 mL/min/1.73m2 Invalid   
Interpretation Code                                 [facility]  
   
                                        Comment on above:   Performed By: #### 5  
[id number], 76207123, 1694383373,   
4474432721,   
3399254876, 1430317, 2415324923, 7110633491, 8551516, 5946450   
####[facility] (DEFAULT)[address] [place] [address]   
[address]   
   
                                eGFR AA         >60             Invalid   
Interpretation Code                                 [facility]  
   
                                        Comment on above:   Performed By: #### 5  
[id number], 67430567, 6567745066,   
0556800300,   
7213517410, 1679116, 0658071057, 0651702565, 4228475, 6085163   
####[facility] (DEFAULT)[address] [place] [address]   
[address]   
   
                      Albumin [Mass/Vol] 3.8 g/dL   Normal     3.5-5.0    [facility]  
   
                                        Comment on above:   Performed By: #### 5  
[id number], 73924750, 6903298493,   
4408140690,   
1273802260, 4058542, 1393294916, 3108543147, 2445378, 0557031   
####[facility] (DEFAULT)[address] [place] [address]   
[address]   
   
                                                    Albumin/Globulin   
[Mass ratio]    1.1 {ratio}     Low             1.4-2.6         [facility]  
   
                                        Comment on above:   Performed By: #### 5  
364183502, 26976090, 2883281070,   
8033386569,   
4444704556, 6345811, 9850728930, 2641069959, 7586443, 7586526   
####[facility] (DEFAULT)[address] [place] [address]   
[address]   
   
                      Alk Phos   67 IU/L    Normal     32-91      [facility]  
   
                                        Comment on above:   Performed By: #### 5  
445191911, 92173412, 5608083536,   
8854084874,   
5334355636, 8520380, 0104998372, 8685725158, 2694233, 1991879   
####[facility] (DEFAULT)[address] [place] [address]   
   
                                                    ALT [Catalytic   
activity/Vol]   15.0 U/L        Low             17.0-63.0       [facility]  
   
                                        Comment on above:   Performed By: #### 5  
206207059, 53435975, 1681842760,   
7389675801,   
1751972708, 2982228, 0684868090, 0782105618, 6135838, 7977089   
####[facility] (DEFAULT)[address] [place] [address]   
[address]   
   
                                                    Anion gap   
[Moles/Vol]     9.0 mmol/L      Normal          5.0-19.0        [facility]  
   
                                        Comment on above:   Performed By: #### 5  
956471272, 75879418, 4331678237,   
5004726624,   
1373774208, 9402461, 0069745071, 4311529924, 3561427, 9178524   
####[facility] (DEFAULT)[address] [place] [address]   
[address]   
   
                                                    AST [Catalytic   
activity/Vol]   16 U/L          Normal          15-41           [facility]  
   
                                        Comment on above:   Performed By: #### 5  
424126586, 46628960, 5528919373,   
5562870380,   
8617806797, 4394944, 6558520947, 3852875160, 6578540, 2379491   
####[facility] (DEFAULT)[address] [place] [address]   
[address]   
   
                      Bili Total 0.7 mg/dL  Normal     0.3-1.2    [facility]  
   
                                        Comment on above:   Performed By: #### 5  
229664857, 80347964, 4536809931,   
5666935588,   
9368037056, 0898551, 7359142663, 2046020840, 8523432, 7112315   
####[facility] (DEFAULT)[address] [place] [address]   
[address]   
   
                      Calcium [Mass/Vol] 8.9 mg/dL  Normal     8.9-10.3   [facility]  
   
                                        Comment on above:   Performed By: #### 5  
326391333, 10409951, 7420384146,   
6365022079,   
9643114656, 5543718, 0176830613, 8110975386, 7615948, 7116687   
####[facility] (DEFAULT)[address] [place] [address]   
[address]   
   
                      Chloride [Moles/Vol] 112 mmol/L High       101-111    [facility]  
   
                                        Comment on above:   Performed By: #### 5  
804508818, 77641158, 3363945691,   
5621132024,   
8880651410, 3644340, 3597365954, 9368161821, 8074346, 0148004   
####[facility] (DEFAULT)[address] [place] [address]   
[address]   
   
                      CO2 [Moles/Vol] 24 mmol/L  Normal     21-32      [facility]  
   
                                        Comment on above:   Performed By: #### 5  
330929902, 21453560, 7934981731,   
1791491248,   
5031985114, 7249936, 5258947473, 5980353005, 5425798, 0432581   
####[facility] (DEFAULT)[address] [place] [address]   
[address]   
   
                                                    Creatinine   
[Mass/Vol]      1.16 mg/dL      Normal          0.90-1.30       [facility]  
   
                                        Comment on above:   Performed By: #### 5  
231768867, 87438244, 3668298426,   
1475641163,   
4734482522, 0887554, 7821299329, 9778726729, 6140357, 7854741   
####[facility] (DEFAULT)[address] [place] [address]   
[address]   
   
                                                    Globulin (S)   
[Mass/Vol]      3.3 g/dL        Normal          1.5-4.3         [facility]  
   
                                        Comment on above:   Performed By: #### 5  
897582116, 64527527, 3332354353,   
9332929701,   
9284127502, 2448449, 2052271143, 9584532775, 9315321, 8327790   
####[facility] (DEFAULT)[address] [place] [address]   
[address]   
   
                      Glucose [Mass/Vol] 137.0 mg/dL High       74.0-118.0 [facility]  
   
                                        Comment on above:   Performed By: #### 5  
474345846, 61349463, 6010550285,   
0982586535,   
9506954242, 9992211, 3751262794, 7581490895, 7090423, 6441361   
####[facility] (DEFAULT)[address] [place] [address]   
[address]   
   
                                Osmolality      289 mOsm/L      Invalid   
Interpretation Code                                 [facility]  
   
                                        Comment on above:   Performed By: #### 5  
633585510, 22725734, 3650769491,   
4684839145,   
6890974318, 8265963, 4471836114, 3306342729, 5774612, 6187500   
####[facility] (DEFAULT)[address] [place] [address]   
[address]   
   
                                                    Potassium   
[Moles/Vol]     4.0 mmol/L      Normal          3.6-5.1         [facility]  
   
                                        Comment on above:   Performed By: #### 5  
094912025, 64273203, 3688760238,   
0314780810,   
5775089384, 1386674, , 2518036806, 3094176, 1570882   
####[facility] (DEFAULT)[address] [place] [address]   
[address]   
   
                      Protein [Mass/Vol] 7.1 g/dL   Normal     6.5-8.1    [facility]  
   
                                        Comment on above:   Performed By: #### 5  
209858975, 54496171, 8295850289,   
0631675905,   
1282205379, 3928606, 8698779879, 6949048650, 6677537, 0487176   
####[facility] (DEFAULT)[address] [place] [address]   
[address]   
   
                      Sodium [Moles/Vol] 141.0 mmol/L Normal     136.0-144.0 [facility]  
   
                                        Comment on above:   Performed By: #### 5  
667980789, 18412964, 9247930089,   
7366753282,   
7108375272, 9412544, 1501805954, 2848484164, 5705564, 4877100   
####[facility] (DEFAULT)[address] [place] [address]   
   
                                                    Urea nitrogen   
[Mass/Vol]      28 mg/dL        High            8-26            [facility]  
   
                                        Comment on above:   Performed By: #### 5  
781206616, 77003487, 4657607644,   
8013430802,   
3134616029, 0556354, 6582337793, 0936215892, 2126746, 3883703   
####[facility] (DEFAULT)[address] [place] [address]   
[address]   
   
                                                    Urea   
nitrogen/Creatinine   
[Mass ratio]    24.1 mg/mg      High            4.6-16.2        [facility]  
   
                                        Comment on above:   Performed By: #### 5  
591274827, 97579833, 4966063722,   
7621292899,   
6677134824, 4772344, 5691518868, 8568416412, 1834976, 2680428   
####[facility] (DEFAULT)[address] [place] [address]   
[address]   
   
                      Breakpoint Chem *******    [facility]  
   
                                        Comment on above:   Performed By: #### 5  
542301062, 90584883, 8757004792,   
3941872186,   
2039300950, 0726376, 3130957966, 5517699308, 8864755, 7145958   
####[facility] (DEFAULT)[address] [place] [address]   
[address]   
   
                                                    CT Head or Brain w/o Todd whitten 2024   
   
                                                    CT Head or Brain w/o   
Contrast                        Normal                          [facility]  
   
                                                    ED Clinical Summaryon 2024   
   
                      ED Clinical Summary            Normal                [facility]  
   
                                                    ED Note-Nursingon 2024  
   
   
                                        ED Note-Nursing     Patient is currently  
   
admitted to [facility]   
and pending labs   
will be reviewed by   
admitting provider.  
Electronically   
Signed by: Delaney Frazier on 2024   
12:18 EDT           [facility]  
   
                                                    ED Patient Education Noteon   
2024   
   
                                                    ED Patient Education   
Note            [facility]  
   
                                                    ED Patient Summaryon   
024   
   
                      ED Patient Summary            Normal                [facility]  
   
                                                    Extra Redon 2024   
   
                                Tube Collected  Yes             Invalid   
Interpretation Code                                 [facility]  
   
                                        Comment on above:   Performed By: #### 5  
[id number], 25515886, 2254521749,   
2303603172,   
8042574793, 6550647, 0227349803, 4721458701, 7685120, 2947870   
####[facility] (DEFAULT)[address] [place] [address]   
[address]   
   
                                                    Folateon 2024   
   
                      Folic Acid Level 8.87 ng/mL Normal     5.90-24.80 [facility]  
   
                                        Comment on above:   Performed By: #### 6  
77671467, 8257762, 2161132, 9717499,   
2328605   
####[facility] (DEFAULT)[address] [place] [address]   
   
                                                    HgbA1c Standardon 2024  
   
   
                                .Hb             14.7            Invalid   
Interpretation Code                                 [facility]  
   
                                        Comment on above:   Performed By: #### 1  
[id number] ####[facility]   
(DEFAULT)[address] [place] [address]   
   
                                .Hgb A1c        0.59 g/dL       Invalid   
Interpretation Code                                 [facility]  
   
                                        Comment on above:   Performed By: #### 1  
[id number] ####[facility]   
(DEFAULT)[address] [place] [address]   
   
                                Glucose [Mass/Vol] 120 mg/dL       Invalid   
Interpretation Code                                 [facility]  
   
                                        Comment on above:   Performed By: #### 1  
[id number] ####[facility]   
(DEFAULT)[address] [place] [address]   
   
                                                    HbA1c (Bld) [Mass   
fraction]       5.8 %           Normal          4.6-6.2         [facility]  
   
                                        Comment on above:   Performed By: #### 1  
[id number] ####[facility]   
(DEFAULT)[address] [place] [address]   
   
                                                    CARLISLE Formon 2024   
   
                                        CARLISLE Form           [id number]  
[id number]  
#[id number]        Normal                                  [facility]  
   
                                                    Magnesiumon 2024   
   
                      Magnesium [Mass/Vol] 2.08 mg/dL Normal     1.80-2.50  [facility]  
   
                                        Comment on above:   Performed By: #### 5  
[id number], 04775962, 8880607619,   
7846567149,   
2731966488, 3813497, 3591454160, 7269584648, 3805941, 9780418   
####[facility] (DEFAULT)[address] [place] [address]   
[address]   
   
                                                    Nutrition Noteon 2024   
   
                      Nutrition Note            Normal                [facility]  
   
                                                    PTon 2024   
   
                                                    INR Coag (PPP)   
[Relative time] 1.04 {INR}      Normal          0.91-1.11       [facility]  
   
                                        Comment on above:   Performed By: #### 5  
[id number], 82159870, 5653282286,   
2907457087,   
9610139170, 3389927, 3304239364, 7886839301, 0579758, 1276917   
####[facility] (DEFAULT)[address] [place] [address]   
[address]   
   
                      PT         10.8 second(s) Normal     9.7-11.8   [facility]  
   
                                        Comment on above:   Performed By: #### 5  
[id number], 97924210, 2367123811,   
9354223393,   
7603952862, 6504608, 6138980032, 8833436257, 1727302, 0616030   
####[facility] (DEFAULT)[address] [place] [address]   
[address]   
   
                                                    Pharmacy Noteon 2024   
   
                      Pharmacy Note            [facility]  
   
                                                    Progress Note - Nurseon    
   
                                                    Progress Note -   
Nurse                           [facility]  
   
                                                    TSH w/ Reflex to FT4on    
   
                      TSH Qn     2.00 m[IU]/L Normal     0.45-5.33  [facility]  
   
                                        Comment on above:   Performed By: #### 6  
[id number], 2986840, 7662531, 6495805,   
9431637   
####[facility] (DEFAULT)[address] [place] [address]   
   
                                                    TnI HSon 2024   
   
                                                    Troponin I High   
Sensitivity     15.6 pg/mL      Normal          <=20.0          [facility]  
   
                                        Comment on above:   Performed By: #### 5  
[id number], 44555923, 5066060444,   
5394652720,   
4604875805, 1625581, 0860125891, 0440408987, 2756987, 4158848   
####[facility] (DEFAULT)[address] [place] [address]   
   
                                                    UA w Culture if Ind Standard  
on 2024   
   
                      Breakpoint UA ********   [facility]  
   
                                        Comment on above:   Performed By: #### 1  
[id number] ####[facility]   
(DEFAULT)[address] [place] [address]   
   
                      Color (U)  Yellow     [facility]  
   
                                        Comment on above:   Performed By: #### 1  
[id number] ####[facility]   
(DEFAULT)[address] [place] [address]   
   
                                Culture?        Not Indicated   Invalid   
Interpretation Code                                 [facility]  
   
                                        Comment on above:   Result Comment: Resu  
lt created by rule GL_MAGR_ADD_UA_CULT1   
   
                                                            Performed By: #### 1  
[id number] ####[facility] (DEFAULT)[address] [place] [address]   
   
                                                    Glucose (U)   
[Mass/Vol]      Negative        [facility]  
   
                                        Comment on above:   Performed By: #### 1  
[id number] ####[facility]   
(DEFAULT)[address] [place] [address]   
   
                      Ketones Ql (U) Negative   [facility]  
   
                                        Comment on above:   Performed By: #### 1  
[id number] ####[facility]   
(DEFAULT)[address] [place] [address]   
   
                                Micro?          Not Indicated   Invalid   
Interpretation Code                                 [facility]  
   
                                        Comment on above:   Result Comment: Resu  
lt created by rule GL_MAGR_ADD_UA_MICRO   
   
                                                            Performed By: #### 1  
[id number] ####[facility] (DEFAULT)[address] [place] [address]   
   
                      UA Bilirubin Negative   Normal                [facility]  
   
                                        Comment on above:   Performed By: #### 1  
[id number] ####[facility]   
(DEFAULT)[address] [place] [address]   
   
                      UA Blood   Negative   Normal     NEGATIVE   [facility]  
   
                                        Comment on above:   Performed By: #### 1  
[id number] ####[facility]   
(DEFAULT)[address] [place] [address]   
   
                      UA Clarity CLEAR      Normal     CLEAR      [facility]  
   
                                        Comment on above:   Performed By: #### 1  
[id number] ####[facility]   
(DEFAULT)[address] [place] [address]   
   
                      UA Leuk Est Negative   Normal     NEGATIVE   [facility]  
   
                                        Comment on above:   Performed By: #### 1  
[id number] ####[facility]   
(DEFAULT)[address] [place] [address]   
   
                      UA Nitrite Negative   Normal     NEGATIVE   [facility]  
   
                                        Comment on above:   Performed By: #### 1  
[id number] ####[facility]   
(DEFAULT)[address] [place] [address]   
   
                      UA pH      6.0        Normal     5-8        [facility]  
   
                                        Comment on above:   Performed By: #### 1  
[id number] ####[facility]   
(DEFAULT)[address] [place] [address]   
   
                      UA Protein Negative   Normal     NEGATIVE   [facility]  
   
                                        Comment on above:   Performed By: #### 1  
[id number] ####[facility]   
(DEFAULT)[address] [place] [address]   
   
                      UA Spec Grav >=1.030    Normal     1.001-1.035 [facility]  
   
                                        Comment on above:   Performed By: #### 1  
[id number] ####[facility]   
(DEFAULT)[address] [place] [address]   
   
                      UA Urobilinogen 0.2 mg/dL  Normal     0.2-1.0    [facility]  
   
                                        Comment on above:   Performed By: #### 1  
[id number] ####[facility]   
(DEFAULT)[address] [place] [address]   
   
                      Urine Source [facility]  
   
                                        Comment on above:   Performed By: #### 1  
[id number] ####[facility]   
(DEFAULT)[address] [place] [address]   
   
                                                    Vit B12 Lvlon 2024   
   
                      Vit B12    275        Normal     180-914    [facility]  
   
                                        Comment on above:   Performed By: #### 6  
[id number], [id number], 1918743, 7294175,   
[id number]   
####[facility] (DEFAULT)[address] [place] [address]   
[address]   
   
                                                    Vit D25 OHon 2024   
   
                      Vitamin D 25 OH 16 ng/mL   Low             [facility]  
   
                                        Comment on above:   Performed By: #### 6  
[id number], 6340505, 1669415, 7445814,   
[id number]   
####[facility] (DEFAULT)[address] [place] [address]   
[address]   
   
                                                    XR Chest 1 View Frontalon    
   
                                                    XR Chest 1 View   
Frontal                         [facility]  
   
                                                    Coding Summaryon 2024   
   
                      Coding Summary            [facility]  
   
                                                    C Bloodon 2024   
   
                                        C Blood             Right AC @ 10:30 am   
MW  
No growth at [facility] Days [facility]  
   
                                        Comment on above:   Performed By: #### 6  
[id number] ####[facility] (DEFAULT)[address] [place] [address]   
   
                                                    ED Note - Physicianon 07-10-  
2024   
   
                      ED Note - Physician            [facility]  
   
                                                    C Urineon 2024   
   
                      C Urine               Normal                [facility]  
   
                                        Comment on above:   Performed By: #### 1  
[id number], [id number], [id number] ####[facility] (DEFAULT)[address] [place] [address]   
   
                                                    Outside Recordson 2024  
   
   
                                        Outside Records     [id number]  
[id number]  
#1.00OTGTIFF        [facility]  
   
                                                    .Auto Diff 1on 2024   
   
                      Auto Mono % 8 %        Normal     -12       [facility]  
   
                                        Comment on above:   Performed By: #### 1  
[id number], [id number], 60236380, 0509739,   
7410350150, [id number] ####[facility] (DEFAULT)[address] [place] [address]   
   
                      Baso Abs#  0.0 x10    Normal     0.0-0.2    [facility]  
   
                                        Comment on above:   Performed By: #### 1  
[id number], 4152510663, 48926227, 6721182,   
4038982574, 7562038704 ####[facility] (DEFAULT)[address] [place] [address]   
   
                                                    Basophils/100 WBC   
(Bld)           0.4 %           Normal          0.2-2.0         [facility]  
   
                                        Comment on above:   Performed By: #### 1  
[id number], 4627422901, 71820298, 5438544,   
8652192826, [id number] ####[facility] (DEFAULT)[address] [place] [address]   
   
                      Eos Abs#   0.1 x10    Normal     0.0-0.4    [facility]  
   
                                        Comment on above:   Performed By: #### 1  
[id number], 8029659710, 61275035, 1515456,   
6011376069, 4951294198 ####[facility] (DEFAULT)[address] [place] [address]   
   
                                                    Eosinophils/100 WBC   
(Bld)           1.4 %           Normal          0.9-4.0         [facility]  
   
                                        Comment on above:   Performed By: #### 1  
[id number], 5647313607, 34305723, 4399653,   
4245874058, 4443590991 ####[facility] (DEFAULT)[address] [place] [address]   
   
                      Lymph Abs# 1.0 x10    Low        1.3-2.9    [facility]  
   
                                        Comment on above:   Performed By: #### 1  
[id number], 4640906498, 87211141, 6963568,   
8156231808, [id number] ####[facility] (DEFAULT)[address] [place] [address]   
   
                                                    Lymphocytes/100 WBC   
(Bld)           16 %            Normal          14-48           [facility]  
   
                                        Comment on above:   Performed By: #### 1  
[id number], 9549869251, 24032238, 8048683,   
7375419484, [id number] ####[facility] (DEFAULT)[address] [place] [address]   
   
                      Mono Abs#  0.5 x10    Normal     0.0-0.8    [facility]  
   
                                        Comment on above:   Performed By: #### 1  
[id number], 7102500474, 01534880, 0193133,   
8893104234, 3439210902 ####[facility] (DEFAULT)[address] [place] [address]   
   
                      Neut Abs#  4.9 x10    Normal     1.5-9.2    [facility]  
   
                                        Comment on above:   Performed By: #### 1  
[id number], 1438926756, 28286135, 3426294,   
3845811545, [id number] ####[facility] (DEFAULT)[address] [place] [address]   
   
                                                    Neutrophils/100 WBC   
(Bld)           74 %            Normal          44-88           [facility]  
   
                                        Comment on above:   Performed By: #### 1  
[id number], 5179612639, 79806890, 5434478,   
8474562530, 0175252973 ####[facility] (DEFAULT)[address] [place] [address]   
   
                                                    CBC w/ Auto Diffon   
4   
   
                                                    Erythrocyte   
distribution width   
(RBC) [Ratio]   14.8 %          Normal          11.5-15.0       [facility]  
   
                                        Comment on above:   Performed By: #### 1  
[id number], 4532033539, 11966538, 1018623,   
8244327287, 1642567347 ####[facility] (DEFAULT)[address] [place] [address]   
   
                                                    Hematocrit (Bld)   
[Volume fraction] 42.1 %          Normal          34.8-51.9       [facility]  
   
                                        Comment on above:   Performed By: #### 1  
[id number], 0021446797, 11455867, 3207755,   
2657832998, 2307638429 ####[facility] (DEFAULT)[address] [place] [address]   
   
                                                    Hemoglobin (Bld)   
[Mass/Vol]      13.8 g/dL       Normal          11.8-17.7       [facility]  
   
                                        Comment on above:   Performed By: #### 1  
[id number], 6045276227, 88936558, 9005714,   
4660074078, 4009614609 ####[facility] (DEFAULT)[address] [place] [address]   
   
                                Man Diff?       Auto            Invalid   
Interpretation Code                                 [facility]  
   
                                        Comment on above:   Performed By: #### 1  
[id number], 9025480797, 00151632, 9996540,   
4961696208, 2856621073 ####[facility] (DEFAULT)[address] [place] [address]   
   
                                                    MCH (RBC) [Entitic   
mass]           29 pg           Normal          24-34           [facility]  
   
                                        Comment on above:   Performed By: #### 1  
[id number], 1681604943, 68602228, 5394535,   
9911574149, 4871786859 ####[facility] (DEFAULT)[address] [place] [address]   
   
                                                    MCHC (RBC)   
[Mass/Vol]      33 g/dL         Normal          26-37           [facility]  
   
                                        Comment on above:   Performed By: #### 1  
[id number], 1287185314, 91560282, 7289594,   
6980740863, 9956501027 ####[facility] (DEFAULT)[address] [place] [address]   
   
                                                    MCV (RBC) [Entitic   
vol]            90 fL           Normal                    [facility]  
   
                                        Comment on above:   Performed By: #### 1  
[id number], 9831136704, 51130039, 6726326,   
9280136196, 4397407540 ####[facility] (DEFAULT)[address] [place] [address]   
   
                      Platelet   191 x10    Normal     138-427    [facility]  
   
                                        Comment on above:   Performed By: #### 1  
[id number], 9567150341, 84670173, 6838756,   
2982449440, 8551126445 ####[facility] (DEFAULT)[address] [place] [address]   
   
                                                    Platelet mean volume   
(Bld) [Entitic vol] 8.1 fL          Normal          6.3-10.2        [facility]  
   
                                        Comment on above:   Performed By: #### 1  
[id number], 1368962752, 01868174, 1810095,   
8999217596, 7076145615 ####[facility] (DEFAULT)[address] [place] [address]   
   
                      RBC        4.68 x10   Normal     3.70-5.30  [facility]  
   
                                        Comment on above:   Performed By: #### 1  
[id number], 7743116441, 42149930, 5239207,   
1949870211, 3355828885 ####[facility] (DEFAULT)[address] [place] [address]   
   
                      WBC        6.6 x10    Normal     3.5-10.5   [facility]  
   
                                        Comment on above:   Performed By: #### 1  
[id number], 8226297961, 84672491, 7345840,   
1593267824, 6905847675 ####[facility] (DEFAULT)[address] [place] [address]   
   
                                                    [facility] Standardon 2024   
   
                                eGFR Non AA     57 mL/min/1.73m2 Invalid   
Interpretation Code                                 [facility]  
   
                                        Comment on above:   Performed By: #### 1  
[id number], 3607104410, 20760495, 6883406,   
7874088795, 4906796074 ####[facility] (DEFAULT)[address] [place] [address]   
   
                                eGFR AA         >60             Invalid   
Interpretation Code                                 [facility]  
   
                                        Comment on above:   Performed By: #### 1  
[id number], 0006256633, 72987241, 6585992,   
7639079509, 2069390046 ####[facility] (DEFAULT)[address] [place] [address]   
   
                      Albumin [Mass/Vol] 3.9 g/dL   Normal     3.5-5.0    [facility]  
   
                                        Comment on above:   Performed By: #### 1  
[id number], 6233866319, 66263763, 1143164,   
8125442316, 2569207398 ####[facility] (DEFAULT)[address] [place] [address]   
   
                                                    Albumin/Globulin   
[Mass ratio]    1.1 {ratio}     Low             1.4-2.6         [facility]  
   
                                        Comment on above:   Performed By: #### 1  
[id number], 3802973255, 29935706, 3551182,   
6949395448, 1076227473 ####[facility] (DEFAULT)[address] [place] [address]   
   
                      Alk Phos   75 IU/L    Normal     32-91      [facility]  
   
                                        Comment on above:   Performed By: #### 1  
[id number], 5891782589, 14881903, 7643446,   
6705472063, 9673308234 ####[facility] (DEFAULT)[address] [place] [address]   
   
                                                    ALT [Catalytic   
activity/Vol]   18.0 U/L        Normal          17.0-63.0       [facility]  
   
                                        Comment on above:   Performed By: #### 1  
[id number], 2445786690, 20882510, 3352853,   
9052957863, 5169967759 ####[facility] (DEFAULT)[address] [place] [address]   
   
                                                    Anion gap   
[Moles/Vol]     9.4 mmol/L      Normal          5.0-19.0        [facility]  
   
                                        Comment on above:   Performed By: #### 1  
[id number], 2467265400, 57513261, 3519631,   
1147742035, 9586132932 ####[facility] (DEFAULT)[address] [place] [address]   
   
                                                    AST [Catalytic   
activity/Vol]   24 U/L          Normal          15-41           [facility]  
   
                                        Comment on above:   Performed By: #### 1  
[id number], 9907054179, 48937440, 0726890,   
5113876129, 7471695731 ####[facility] (DEFAULT)[address] [place] [address]   
   
                      Bili Total 0.7 mg/dL  Normal     0.3-1.2    [facility]  
   
                                        Comment on above:   Performed By: #### 1  
812194675, 7063786005, 32565711, 4566424,   
8521650653, 1196831273 ####[facility] (DEFAULT)[address] [place] [address]   
   
                      Calcium [Mass/Vol] 8.8 mg/dL  Low        8.9-10.3   [facility]  
   
                                        Comment on above:   Performed By: #### 1  
[id number], 2961285519, 57699587, 5036083,   
2287442289, 5780310238 ####[facility] (DEFAULT)[address] [place] [address]   
   
                      Chloride [Moles/Vol] 110 mmol/L Normal     101-111    [facility]  
   
                                        Comment on above:   Performed By: #### 1  
[id number], 0267236162, 01020793, 8875869,   
2017141706, 9698166242 ####[facility] (DEFAULT)[address] [place] [address]   
   
                      CO2 [Moles/Vol] 23 mmol/L  Normal     21-32      [facility]  
   
                                        Comment on above:   Performed By: #### 1  
[id number], 4445214537, 95741782, 7009486,   
2843855406, 2974282676 ####[facility] (DEFAULT)[address] [place] [address]   
   
                                                    Creatinine   
[Mass/Vol]      1.20 mg/dL      Normal          0.90-1.30       [facility]  
   
                                        Comment on above:   Performed By: #### 1  
[id number], 1657990274, 50549716, 3595198,   
7738656336, 9491691349 ####[facility] (DEFAULT)[address] [place] [address]   
   
                                                    Globulin (S)   
[Mass/Vol]      3.3 g/dL        Normal          1.5-4.3         [facility]  
   
                                        Comment on above:   Performed By: #### 1  
[id number], 3344837376, 12743361, 8356909,   
1001741958, 8985303573 ####[facility] (DEFAULT)[address] [place] [address]   
   
                      Glucose [Mass/Vol] 114.0 mg/dL Normal     74.0-118.0 [facility]  
   
                                        Comment on above:   Performed By: #### 1  
[id number], 4241326777, 04400890, 2866709,   
1750543273, 6056931547 ####[facility] (DEFAULT)[address] [place] [address]   
   
                                Osmolality      282 mOsm/L      Invalid   
Interpretation Code                                 [facility]  
   
                                        Comment on above:   Performed By: #### 1  
[id number], 4343972290, 00008289, 0424749,   
1497624485, 2404837292 ####[facility] (DEFAULT)[address] [place] [address]   
   
                                                    Potassium   
[Moles/Vol]     4.4 mmol/L      Normal          3.6-5.1         [facility]  
   
                                        Comment on above:   Performed By: #### 1  
[id number], 1374594327, 71402107, 7584055,   
2174064117, 5304993348 ####[facility] (DEFAULT)[address] [place] [address]   
   
                      Protein [Mass/Vol] 7.2 g/dL   Normal     6.5-8.1    [facility]  
   
                                        Comment on above:   Performed By: #### 1  
[id number], 6700537811, 23217853, 2726584,   
8689286775, 9515200022 ####[facility] (DEFAULT)[address] [place] [address]   
   
                      Sodium [Moles/Vol] 138.0 mmol/L Normal     136.0-144.0 [facility]  
   
                                        Comment on above:   Performed By: #### 1  
[id number], 0911275759, 91365700, 5845205,   
2198039578, 6750753181 ####[facility] (DEFAULT)[address] [place] [address]   
   
                                                    Urea nitrogen   
[Mass/Vol]      29 mg/dL        High            8-26            [facility]  
   
                                        Comment on above:   Performed By: #### 1  
[id number], 1403968510, 06667044, 5116759,   
0023970634, 9755566819 ####[facility] (DEFAULT)[address] [place] [address]   
   
                                                    Urea   
nitrogen/Creatinine   
[Mass ratio]    24.1 mg/mg      High            4.6-16.2        [facility]  
   
                                        Comment on above:   Performed By: #### 1  
[id number], 3819823829, 79465032, 7965032,   
9767141933, 6763822807 ####[facility] (DEFAULT)[address] [place] [address]   
   
                                                    ED Clinical Summaryon 2024   
   
                      ED Clinical Summary            Normal                [facility]  
   
                                                    ED Note - Physicianon 2024   
   
                      ED Note - Physician            [facility]  
   
                                                    ED Patient Education Noteon   
2024   
   
                                                    ED Patient Education   
Note                            Normal                          [facility]  
   
                                                    ED Patient Summaryon   
024   
   
                      ED Patient Summary            Normal                [facility]  
   
                                                    Extra Blueon 2024   
   
                                Tube Collected  Yes             Invalid   
Interpretation Code                                 [facility]  
   
                                        Comment on above:   Performed By: #### 1  
[id number], 1305960088, 66709236, 0741169,   
1237673998, 5990617665 ####[facility] (DEFAULT)[address] [place] [address]   
   
                                                    Lactic Acidon 2024   
   
                      Lactic Acid 14.1 mg/dL Normal     4.5-19.8   [facility]  
   
                                        Comment on above:   Performed By: #### 2  
[id number] ####[facility] (DEFAULT)[address] [place] [address]   
   
                                                    Progress Note - Nurseon    
   
                                                    Progress Note -   
Nurse                           [facility]  
   
                                                    SARS-CoV-2 (COVID-19) PCRon   
2024   
   
                                                    Employed in   
healthcare?               Unknown                   Invalid   
Interpretation Code                                 [facility]  
   
                                        Comment on above:   Performed By: #### 6  
[id number] ####[facility]   
(DEFAULT)[address] [place] [address]   
   
                                Group care resident? Unknown         Invalid   
Interpretation Code                                 [facility]  
   
                                        Comment on above:   Performed By: #### 6  
[id number] ####[facility]   
(DEFAULT)[address] [place] [address]   
   
                                In ICU?         Unknown         Invalid   
Interpretation Code                                 [facility]  
   
                                        Comment on above:   Performed By: #### 6  
[id number] ####[facility]   
(DEFAULT)[address] [place] [address]   
   
                      Internal Control Pass       [facility]  
   
                                        Comment on above:   Performed By: #### 6  
[id number] ####[facility]   
(DEFAULT)[address] [place] [address]   
   
                                Pregnancy status? Unknown         Invalid   
Interpretation Code                                 [facility]  
   
                                        Comment on above:   Performed By: #### 6  
[id number] ####[facility]   
(DEFAULT)[address] [place] [address]   
   
                                                    SARS-CoV-2   
(COVID-19) RNA   
MIRIAM+probe Ql (Unsp   
spec)           Not detected    Normal          Not Detected    [facility]  
   
                                        Comment on above:   Result Comment: Perf  
ormed by PCR methodology.   
   
                                                            Performed By: #### 6  
[id number] ####[facility] (DEFAULT)[address] [place] [address]   
   
                                                    SARS-CoV-2   
(COVID-19) RNA   
MIRIAM+probe Ql (Unsp   
spec)                     Unknown                   Invalid   
Interpretation Code                                 [facility]  
   
                                        Comment on above:   Performed By: #### 6  
[id number] ####[facility]   
(DEFAULT)[address] [place] [address]   
   
                                                    Symptomatic as   
defined by CDC?           Unknown                   Invalid   
Interpretation Code                                 [facility]  
   
                                        Comment on above:   Performed By: #### 6  
[id number] ####[facility]   
(DEFAULT)[address] [place] [address]   
   
                                                    UA Qxcqt6at 2024   
   
                      UA Bacteria 3+         [facility]  
   
                                        Comment on above:   Order Comment: Urina  
lysis Microscopic order added on by   
Discern   
Expert Rules system.   
   
                                                            Performed By: #### 1  
[id number], [id number], [id number] ####[facility] (DEFAULT)[address] [place] [address]   
   
                      UA RBC     15-20      [facility]  
   
                                        Comment on above:   Order Comment: Urina  
lysis Microscopic order added on by   
SAVO   
Expert Rules system.   
   
                                                            Performed By: #### 1  
[id number], [id number], [id number] ####[facility] (DEFAULT)[address] [place] [address]   
   
                      UA Squam Epi Rare       [facility]  
   
                                        Comment on above:   Order Comment: Urina  
lysis Microscopic order added on by   
Discern   
Expert Rules system.   
   
                                                            Performed By: #### 1  
[id number], [id number], [id number] ####[facility] (DEFAULT)[address] [place] [address]   
   
                      UA WBC     60-70      [facility]  
   
                                        Comment on above:   Order Comment: Urina  
lysis Microscopic order added on by   
Discern   
Expert Rules system.   
   
                                                            Performed By: #### 1  
[id number], [id number], [id number] ####[facility] (DEFAULT)[address] [place] [address]   
   
                                                    UA w Culture if Ind Standard  
on 2024   
   
                      Breakpoint UA ********   [facility]  
   
                                        Comment on above:   Performed By: #### 1  
[id number], 08741520, 7481498 ####[facility] (DEFAULT)[address] [place] [address]   
   
                      Color (U)  Yellow     [facility]  
   
                                        Comment on above:   Performed By: #### 1  
[id number], 37768792, 6128036 ####[facility] (DEFAULT)[address] [place] [address]   
   
                      Culture?   Yes        Normal                [facility]  
   
                                        Comment on above:   Result Comment: Resu  
lt created by rule GL_MAGR_ADD_UA_CULT   
Result   
created by rule GL_MAGR_ADD_UA_CULT1   
   
                                                            Performed By: #### 1  
[id number], [id number], [id number] ####[facility] (DEFAULT)[address] [place] [address]   
   
                                                    Glucose (U)   
[Mass/Vol]      Negative        [facility]  
   
                                        Comment on above:   Performed By: #### 1  
[id number], 04569275, 3417413 ####[facility] (DEFAULT)[address] [place] [address]   
   
                      Ketones Ql (U) Negative   [facility]  
   
                                        Comment on above:   Performed By: #### 1  
[id number], 32166105, 8604815 ####[facility] (DEFAULT)[address] [place] [address]   
   
                                Micro?          Indicated       Invalid   
Interpretation Code                                 [facility]  
   
                                        Comment on above:   Result Comment: Resu  
lt created by rule GL_MAGR_ADD_UA_MICRO   
   
                                                            Performed By: #### 1  
[id number], [id number], [id number] ####[facility] (DEFAULT)[address] [place] [address]   
   
                      UA Bilirubin Negative   Normal                [facility]  
   
                                        Comment on above:   Performed By: #### 1  
[id number], 02325924, 9155758 ####[facility] (DEFAULT)[address] [place] [address]   
   
                      UA Blood   MODERATE   Abnormal   NEGATIVE   [facility]  
   
                                        Comment on above:   Performed By: #### 1  
[id number], 63593276, [id number] ####[facility] (DEFAULT)[address] [place] [address]   
   
                      UA Clarity SL CLOUDY  Abnormal   CLEAR      [facility]  
   
                                        Comment on above:   Performed By: #### 1  
[id number], 15972845, [id number] ####[facility] (DEFAULT)[address] [place] [address]   
   
                      UA Leuk Est MODERATE   Abnormal   NEGATIVE   [facility]  
   
                                        Comment on above:   Performed By: #### 1  
[id number], 09002972, 4521084 ####[facility] (DEFAULT)[address] [place] [address]   
   
                      UA Nitrite Positive   Abnormal   NEGATIVE   [facility]  
   
                                        Comment on above:   Performed By: #### 1  
[id number], 15019240, 2358695 ####[facility] (DEFAULT)[address] [place] [address]   
   
                      UA pH      6.0        Normal     5-8        [facility]  
   
                                        Comment on above:   Performed By: #### 1  
[id number], 81111809, 1021289 ####[facility] (DEFAULT)[address] [place] [address]   
   
                      UA Protein TRACE      Abnormal   NEGATIVE   [facility]  
   
                                        Comment on above:   Performed By: #### 1  
[id number], 73157470, 3136061 ####[facility] (DEFAULT)[address] [place] [address]   
   
                      UA Spec Grav >=1.030    Normal     1.001-1.035 [facility]  
   
                                        Comment on above:   Performed By: #### 1  
[id number], 03192289, 2943379 ####[facility] (DEFAULT)[address] [place] [address]   
   
                      UA Urobilinogen 0.2 mg/dL  Normal     0.2-1.0    [facility]  
   
                                        Comment on above:   Performed By: #### 1  
[id number], 37707552, 3704282 ####[facility] (DEFAULT)[address] [place] [address]   
   
                      Urine Source Clean Catch [facility]  
   
                                        Comment on above:   Performed By: #### 1  
[id number], 66691967, 1083601 ####[facility] (DEFAULT)[address] [place] [address]   
   
                                                    XR Chest 2 Viewson   
4   
   
                      XR Chest 2 Views            [facility]  
   
                                                    Outside Recordson 2024  
   
   
                                        Outside Records     [id number]  
[id number]  
2#1.00OTGTIFF       [facility]  
   
                                        Outside Records     [id number]  
[id number]  
8#1.00OTGTIFF       [facility]  
   
                                                    Outside Recordson 2024  
   
   
                                        Outside Records     [id number]  
[id number]  
#1.00OTGTIFF        [facility]  
   
                                                    Coding Summaryon 2024   
   
                      Coding Summary            Normal                [facility]  
   
                                                    Outside Recordson 2024  
   
   
                                        Outside Records     [id number]  
[id number]  
#1.00OTGTIFF        [facility]  
   
                                                    C Urineon 2024   
   
                      C Urine               Normal                [facility]  
   
                                        Comment on above:   Performed By: #### 6  
[id number] ####[facility] (DEFAULT)[address]   
[place] [address]   
   
                                                    Provider Orderson 2024  
   
   
                                        Provider Orders     [id number]  
[id number]  
7#1.00OTGTIFF       [facility]  
   
                                                    Outside Recordson 2024  
   
   
                                        Outside Records     [id number]  
[id number]  
#1.00OTGTIFF        [facility]  
   
                                                    Office/Clinic Noteon   
024   
   
                      Office/Clinic Note            Normal                [facility]  
   
                                                    Outside Recordson 2024  
   
   
                                        Outside Records     [id number]  
[id number]  
1.00OTGTIFF         [facility]  
   
                                        Outside Records     [id number]  
[id number]  
1.00OTGTIFF         [facility]  
   
                                                    Outside Recordson 04-  
   
   
                                        Outside Records     [id number]  
[id number]  
#1.00OTGTIFF        [facility]  
   
                                                    Office/Clinic Noteon   
024   
   
                      Office/Clinic Note            [facility]  
   
                                                    Coding Summaryon 2024   
   
                      Coding Summary            [facility]  
   
                                                    C Bloodon 03-   
   
                                        C Blood             2nd site 15 minutes   
after first Blood   
Culture is drawn  
No growth at 5 Days [facility]  
   
                                        Comment on above:   Performed By: #### 6  
[id number] ####[facility] (DEFAULT)[address] [place] [address]   
   
                      C Blood    No growth at 5 Days [facility]  
   
                                        Comment on above:   Performed By: #### 6  
[id number] ####[facility] (DEFAULT)[address] [place] [address]   
   
                                                    Coding Summaryon 2024   
   
                      Coding Summary            [facility]  
   
                                                    C Urineon 2024   
   
                                        C Urine             Urine Culture   
ordered as a result   
of parameters set on   
specific urine dip   
and urine microsopic   
results.  
No growth at 2 days. [facility]  
   
                                        Comment on above:   Performed By: #### 6  
[id number], [id number], [id number] ####[facility] (DEFAULT)[address] [place] [address]   
   
                                                    Consent Formson 2024   
   
                                        Consent Forms       [id number]  
[id number]  
.00OTGTIFF          [facility]  
   
                                        Consent Forms       [id number]  
[id number]#  
1.00OTGTIFF         [facility]  
   
                                                    Outside Recordson 2024  
   
   
                                        Outside Records     [id number]  
[id number]#  
1.00OTGTIFF         [facility]  
   
                                        Outside Records     [id number]  
[id number]#  
[facility]  
   
                                                    Provider Orderson 2024  
   
   
                                        Provider Orders     [id number]  
[id number]  
[facility]  
   
                                                    Transfer Noteon 2024   
   
                                        Transfer Note       [id number]  
[id number]  
[facility]00[facility]  
   
                                                    .Auto Diff 1on 2024   
   
                      Auto Mono % 10 %       Normal            [facility]  
   
                                        Comment on above:   Performed By: #### 1  
[id number], 9908387, 3240168, 5050342,   
5072455,   
[id number] ####[facility] (DEFAULT)[address] [place] [address]   
   
                      Baso Abs#  0.0 x10    Normal     0.0-0.2    [facility]  
   
                                        Comment on above:   Performed By: #### 1  
[id number], 1345999, 4604784, 7061291,   
0600786,   
[id number] ####[facility] (DEFAULT)[address] [place] [address]   
   
                                                    Basophils/100 WBC   
(Bld)           0.2 %           Normal          0.2-2.0         [facility]  
   
                                        Comment on above:   Performed By: #### 1  
[id number], 4092519, 7932113, 8574583,   
7229691,   
[id number] ####[facility] (DEFAULT)[address] [place] [address]   
   
                      Eos Abs#   0.1 x10    Normal     0.0-0.4    [facility]  
   
                                        Comment on above:   Performed By: #### 1  
0096960, 7702414, 0711419, 5386953,   
8137866,   
[id number] ####[facility] (DEFAULT)[address] [place] [address]   
   
                                                    Eosinophils/100 WBC   
(Bld)           1.3 %           Normal          0.9-4.0         [facility]  
   
                                        Comment on above:   Performed By: #### 1  
5283977, 6520067, 2309792, 7567601,   
4653865,   
[id number] ####[facility] (DEFAULT)[address] [place] [address]   
   
                      Lymph Abs# 0.7 x10    Low        1.3-2.9    [facility]  
   
                                        Comment on above:   Performed By: #### 1  
6923198, 5934799, 7037328, 3159578,   
2800708,   
[id number] ####[facility] (DEFAULT)[address] [place] [address]   
   
                                                    Lymphocytes/100 WBC   
(Bld)           13 %            Low             14-48           [facility]  
   
                                        Comment on above:   Performed By: #### 1  
[id number], 7314649, 9849237, 5902009,   
1923440,   
[id number] ####[facility] (DEFAULT)[address] [place] [address]   
   
                      Mono Abs#  0.5 x10    Normal     0.0-0.8    [facility]  
   
                                        Comment on above:   Performed By: #### 1  
6079015, 8033994, 8332271, 7824396,   
0925538,   
[id number] ####[facility] (DEFAULT)[address] [place] [address]   
   
                      Neut Abs#  3.8 x10    Normal     1.5-9.2    [facility]  
   
                                        Comment on above:   Performed By: #### 1  
1799359, 3194467, 2814750, 7957584,   
5823743,   
[id number] ####[facility] (DEFAULT)[address] [place] [address]   
   
                                                    Neutrophils/100 WBC   
(Bld)           75 %            Normal          44-88           [facility]  
   
                                        Comment on above:   Performed By: #### 1  
5473306, 0686657, 5606190, 0736498,   
3985916,   
[id number] ####[facility] (DEFAULT)[address] [place] [address]   
   
                                                    CBC w/ Auto Diffon   
4   
   
                                                    Erythrocyte   
distribution width   
(RBC) [Ratio]   15.1 %          High            11.5-15.0       [facility]  
   
                                        Comment on above:   Performed By: #### 1  
0986469, 5220702, 2942501, 7364086,   
4610858,   
[id number] ####[facility] (DEFAULT)[address] [place] [address]   
   
                                                    Hematocrit (Bld)   
[Volume fraction] 38.1 %          Normal          34.8-51.9       [facility]  
   
                                        Comment on above:   Performed By: #### 1  
5595015, 8428599, 2429962, 5461479,   
9627590,   
[id number] ####[facility] (DEFAULT)[address] [place] [address]   
   
                                                    Hemoglobin (Bld)   
[Mass/Vol]      13.0 g/dL       Normal          11.8-17.7       [facility]  
   
                                        Comment on above:   Performed By: #### 1  
2040188, 8810348, 6903670, 0728794,   
0820950,   
[id number] ####[facility] (DEFAULT)[address] [place] [address]   
   
                                Man Diff?       Auto            Invalid   
Interpretation Code                                 [facility]  
   
                                        Comment on above:   Performed By: #### 1  
[id number], 0558281, 2008474, 3547278,   
4937867,   
[id number] ####[facility] (DEFAULT)[address] [place] [address]   
   
                                                    MCH (RBC) [Entitic   
mass]           30 pg           Normal          24-34           [facility]  
   
                                        Comment on above:   Performed By: #### 1  
3779619, 9390551, 8740004, 0331425,   
8804458,   
[id number] ####[facility] (DEFAULT)[address] [place] [address]   
   
                                                    MCHC (RBC)   
[Mass/Vol]      34 g/dL         Normal          26-37           [facility]  
   
                                        Comment on above:   Performed By: #### 1  
1720443, 5860383, 5456033, 3574703,   
2707115,   
[id number] ####[facility] (DEFAULT)[address] [place] [address]   
   
                                                    MCV (RBC) [Entitic   
vol]            88 fL           Normal                    [facility]  
   
                                        Comment on above:   Performed By: #### 1  
2260166, 5748156, 5453026, 3603316,   
0931136,   
[id number] ####[facility] (DEFAULT)[address] [place] [address]   
   
                      Platelet   164 x10    Normal     138-427    [facility]  
   
                                        Comment on above:   Performed By: #### 1  
2488936, 0191773, 4630013, 1289436,   
9385029,   
[id number] ####[facility] (DEFAULT)[address] [place] [address]   
   
                                                    Platelet mean volume   
(Bld) [Entitic vol] 8.4 fL          Normal          6.3-10.2        [facility]  
   
                                        Comment on above:   Performed By: #### 1  
3032327, 9291459, 8828354, 0077932,   
9703531,   
[id number] ####[facility] (DEFAULT)[address] [place] [address]   
   
                      RBC        4.34 x10   Normal     3.70-5.30  [facility]  
   
                                        Comment on above:   Performed By: #### 1  
6127323, 3045393, 7635512, 5159290,   
4487212,   
[id number] ####[facility] (DEFAULT)[address] [place] [address]   
   
                      WBC        5.1 x10    Normal     3.5-10.5   [facility]  
   
                                        Comment on above:   Performed By: #### 1  
4414673, 3207197, 3006496, 8657780,   
9106798,   
[id number] ####[facility] (DEFAULT)[address] [place] [address]   
   
                                                    CKon 2024   
   
                                                    CK [Catalytic   
activity/Vol]   364 U/L         Normal                    [facility]  
   
                                        Comment on above:   Performed By: #### 1  
[id number], 6339084, 3487252, 3969018,   
8646724,   
[id number] ####[facility] (DEFAULT)[address] [place] [address]   
   
                                                    CMP Standardon 2024   
   
                                eGFR Non AA     59 mL/min/1.73m2 Invalid   
Interpretation Code                                 [facility]  
   
                                        Comment on above:   Performed By: #### 1  
4130000, 2357977, 9279436, 3360567,   
1040888,   
[id number] ####[facility] (DEFAULT)[address] [place] [address]   
   
                                eGFR AA         >60             Invalid   
Interpretation Code                                 [facility]  
   
                                        Comment on above:   Performed By: #### 1  
7784820, 3350995, 8942629, 8424452,   
2270749,   
[id number] ####[facility] (DEFAULT)[address] [place] [address]   
   
                      Albumin [Mass/Vol] 3.1 g/dL   Low        3.5-5.0    [facility]  
   
                                        Comment on above:   Performed By: #### 1  
0257846, 4273731, 4977819, 2927454,   
7187165,   
[id number] ####[facility] (DEFAULT)[address] [place] [address]   
   
                      Alk Phos   54 IU/L    Normal     32-91      [facility]  
   
                                        Comment on above:   Performed By: #### 1  
6674846, 5277428, 9694202, 2560489,   
5653004,   
[id number] ####[facility] (DEFAULT)[address] [place] [address]   
   
                                                    ALT [Catalytic   
activity/Vol]   9.0 U/L         Low             17.0-63.0       [facility]  
   
                                        Comment on above:   Performed By: #### 1  
0842343, 4091847, 5462895, 8653163,   
3499179,   
[id number] ####[facility] (DEFAULT)[address] [place] [address]   
   
                                                    AST [Catalytic   
activity/Vol]   82 U/L          High            15-41           [facility]  
   
                                        Comment on above:   Performed By: #### 1  
9662178, 6579670, 3685752, 0290487,   
5553992,   
[id number] ####[facility] (DEFAULT)[address] [place] [address]   
   
                      Bili Total 1.0 mg/dL  Normal     0.3-1.2    [facility]  
   
                                        Comment on above:   Performed By: #### 1  
6387755, 4302534, 3818615, 4521758,   
6736462,   
[id number] ####[facility] (DEFAULT)[address] [place] [address]   
   
                      Calcium [Mass/Vol] 8.4 mg/dL  Low        8.9-10.3   [facility]  
   
                                        Comment on above:   Performed By: #### 1  
4673961, 4591736, 5043052, 7568819,   
8033404,   
[id number] ####[facility] (DEFAULT)[address] [place] [address]   
   
                      Chloride [Moles/Vol] 104 mmol/L Normal     101-111    [facility]  
   
                                        Comment on above:   Performed By: #### 1  
9121521, 2856955, 6995082, 2084904,   
3994663,   
[id number] ####[facility] (DEFAULT)[address] [place] [address]   
   
                      CO2 [Moles/Vol] 26 mmol/L  Normal     21-32      [facility]  
   
                                        Comment on above:   Performed By: #### 1  
2077270, 2135228, 5387171, 3097058,   
0115777,   
[id number] ####[facility] (DEFAULT)[address] [place] [address]   
   
                                                    Creatinine   
[Mass/Vol]      1.16 mg/dL      Normal          0.90-1.30       [facility]  
   
                                        Comment on above:   Performed By: #### 1  
6933202, 1368605, 3126342, 5567013,   
6244502,   
[id number] ####[facility] (DEFAULT)[address] [place] [address]   
   
                      Glucose [Mass/Vol] 124.0 mg/dL High       74.0-118.0 [facility]  
   
                                        Comment on above:   Performed By: #### 1  
8578772, 8740062, 5210346, 7150779,   
7971258,   
[id number] ####[facility] (DEFAULT)[address] [place] [address]   
   
                                                    Potassium   
[Moles/Vol]     3.8 mmol/L      Normal          3.6-5.1         [facility]  
   
                                        Comment on above:   Performed By: #### 1  
5954342, 6750535, 7225041, 0660309,   
8990297,   
[id number] ####[facility] (DEFAULT)[address] [place] [address]   
   
                      Protein [Mass/Vol] 6.0 g/dL   Low        6.5-8.1    [facility]  
   
                                        Comment on above:   Performed By: #### 1  
7178390, 1174198, 6966524, 7401413,   
8850140,   
[id number] ####[facility] (DEFAULT)[address] [place] [address]   
   
                      Sodium [Moles/Vol] 140.0 mmol/L Normal     136.0-144.0 [facility]  
   
                                        Comment on above:   Performed By: #### 1  
1327541, 5969796, 0518967, 4471900,   
7558677,   
[id number] ####[facility] (DEFAULT)[address] [place] [address]   
   
                                                    Urea nitrogen   
[Mass/Vol]      22 mg/dL        Normal          8-26            [facility]  
   
                                        Comment on above:   Performed By: #### 1  
0236243, 4216564, 0360584, 1783056,   
3965924,   
[id number] ####[facility] (DEFAULT)[address] [place] [address]   
   
                                                    Albumin/Globulin   
[Mass ratio]    1.0 {ratio}     Low             1.4-2.6         [facility]  
   
                                        Comment on above:   Performed By: #### 1  
4866973, 1371497, 0259206, 5868104,   
3704368,   
[id number] ####[facility] (DEFAULT)[address] [place] [address]   
   
                                                    Anion gap   
[Moles/Vol]     13.8 mmol/L     Normal          5.0-19.0        [facility]  
   
                                        Comment on above:   Performed By: #### 1  
6661606, 9593963, 0638017, 6511363,   
5910506,   
[id number] ####[facility] (DEFAULT)[address] [place] [address]   
   
                                                    Globulin (S)   
[Mass/Vol]      2.9 g/dL        Normal          1.5-4.3         [facility]  
   
                                        Comment on above:   Performed By: #### 1  
6313176, 7937073, 1693372, 8619504,   
9341063,   
[id number] ####[facility] (DEFAULT)[address] [place] [address]   
   
                                Osmolality      284 mOsm/L      Invalid   
Interpretation Code                                 [facility]  
   
                                        Comment on above:   Performed By: #### 1  
1702044, 0726815, 6678154, 7895160,   
4352800,   
[id number] ####[facility] (DEFAULT)[address] [place] [address]   
   
                                                    Urea   
nitrogen/Creatinine   
[Mass ratio]    18.9 mg/mg      High            4.6-16.2        [facility]  
   
                                        Comment on above:   Performed By: #### 1  
5673037, 2354472, 9096908, 9297130,   
3835898,   
[id number] ####[facility] (DEFAULT)[address] [place] [address]   
   
                                                    CRPon 2024   
   
                      CRP        8.3 mg/dL  High       <=0.5      [facility]  
   
                                        Comment on above:   Performed By: #### 1  
4637797, 9776038, 4036504, 9795645,   
4423615,   
[id number] ####[facility] (DEFAULT)[address] [place] [address]   
   
                                                    Education Noteon 2024   
   
                      Education Note            Normal                [facility]  
   
                                                    Inpatient Clinical Summaryon  
 2024   
   
                                                    Inpatient Clinical   
Summary                         Normal                          [facility]  
   
                                                    Inpatient Patient Summaryon   
2024   
   
                                                    Inpatient Patient   
Summary                         Normal                          [facility]  
   
                                                    Myoglobinon 2024   
   
                      Myoglobin [Mass/Vol] 228.5 ng/mL High       17.4-105.7 [facility]  
   
                                        Comment on above:   Performed By: #### 1  
3740930, 4399049, 6267694, 1754062,   
2231649,   
[id number] ####[facility] (DEFAULT)[address] [place] [address]   
   
                                                    Pharmacy Noteon 2024   
   
                      Pharmacy Note            [facility]  
   
                                                    Progress Note - Nurseon    
   
                                                    Progress Note -   
Nurse                           [facility]  
   
                                                    Telemetry Stripson    
   
                                        Telemetry Strips    [id number]  
[id number]  
.[id number]          [facility]  
   
                                                    .Auto Diff 1on 03-   
   
                      Auto Mono % 11 %       Normal            [facility]  
   
                                        Comment on above:   Performed By: #### 2  
464979, 7010167, 1309723, 5251871714,   
7778307135, 76003935, 0895269 ####[facility] (DEFAULT)[address] [place] [address]   
   
                      Baso Abs#  0.0 x10    Normal     0.0-0.2    [facility]  
   
                                        Comment on above:   Performed By: #### 2  
081443, 9941987, 3050171, 4424492613,   
6434048682, 37649827, 0982610 ####[facility] (DEFAULT)[address] [place] [address]   
   
                                                    Basophils/100 WBC   
(Bld)           0.2 %           Normal          0.2-2.0         [facility]  
   
                                        Comment on above:   Performed By: #### 2  
467551, 0771969, 7898004, 1395050067,   
6350590855, 91485335, 3791048 ####[facility] (DEFAULT)[address] [place] [address]   
   
                      Eos Abs#   0.0 x10    Normal     0.0-0.4    [facility]  
   
                                        Comment on above:   Performed By: #### 2  
500633, 0999111, 8029463, 1040102843,   
6227598285, 93552087, 4885975 ####[facility] (DEFAULT)[address] [place] [address]   
   
                                                    Eosinophils/100 WBC   
(Bld)           0.7 %           Low             0.9-4.0         [facility]  
   
                                        Comment on above:   Performed By: #### 2  
094850, 5845810, 8454657, 3048498032,   
5900578818, 79680334, 6047360 ####[facility] (DEFAULT)[address] [place] [address]   
   
                      Lymph Abs# 0.5 x10    Low        1.3-2.9    [facility]  
   
                                        Comment on above:   Performed By: #### 2  
838240, 2465363, 6649684, 3204275770,   
2490573120, 13892928, 7020949 ####[facility] (DEFAULT)[address] [place] [address]   
   
                                                    Lymphocytes/100 WBC   
(Bld)           12 %            Low             14-48           [facility]  
   
                                        Comment on above:   Performed By: #### 2  
867127, 6868082, 3352082, 6848705445,   
2784063894, 73122135, 0907386 ####[facility] (DEFAULT)[address] [place] [address]   
   
                      Mono Abs#  0.5 x10    Normal     0.0-0.8    [facility]  
   
                                        Comment on above:   Performed By: #### 2  
464644, 8540999, 5647309, 1599584070,   
2050680877, 36572511, 9946077 ####[facility] (DEFAULT)[address] [place] [address]   
   
                      Neut Abs#  3.4 x10    Normal     1.5-9.2    [facility]  
   
                                        Comment on above:   Performed By: #### 2  
292607, 1074866, 3734137, 3692356194,   
0524566054, 10454988, 0207886 ####[facility] (DEFAULT)[address] [place] [address]   
   
                                                    Neutrophils/100 WBC   
(Bld)           76 %            Normal          44-88           [facility]  
   
                                        Comment on above:   Performed By: #### 2  
111781, 7533732, 2112531, 2237014848,   
3611824842, 00462488, 9896870 ####[facility] (DEFAULT)[address] [place] [address]   
   
                                                    BMP Standardon 03-   
   
                                eGFR Non AA     60 mL/min/1.73m2 Invalid   
Interpretation Code                                 [facility]  
   
                                        Comment on above:   Performed By: #### 2  
083622, 4334054, 1907322, 0022392969,   
3276725342, 02691350, 8681982 ####[facility] (DEFAULT)[address] [place] [address]   
   
                                eGFR AA         >60             Invalid   
Interpretation Code                                 [facility]  
   
                                        Comment on above:   Performed By: #### 2  
268146, 8413486, 0604184, 2769277446,   
6341187732, 45404373, 0440043 ####[facility] (DEFAULT)[address] [place] [address]   
   
                                                    Anion gap   
[Moles/Vol]     11.7 mmol/L     Normal          5.0-19.0        [facility]  
   
                                        Comment on above:   Performed By: #### 2  
491856, 9199421, 5137991, 1565963683,   
7637625021, 74290797, 0474956 ####[facility] (DEFAULT)[address] [place] [address]   
   
                      Calcium [Mass/Vol] 8.2 mg/dL  Low        8.9-10.3   [facility]  
   
                                        Comment on above:   Performed By: #### 2  
488388, 1443327, 1569780, 5831903536,   
6675735761, 46481291, 6337718 ####[facility] (DEFAULT)[address] [place] [address]   
   
                      Chloride [Moles/Vol] 107 mmol/L Normal     101-111    [facility]  
   
                                        Comment on above:   Performed By: #### 2  
971939, 6785459, 3215112, 5500518258,   
0968418853, 03711407, 6533627 ####[facility] (DEFAULT)[address] [place] [address]   
   
                      CO2 [Moles/Vol] 26 mmol/L  Normal     21-32      [facility]  
   
                                        Comment on above:   Performed By: #### 2  
888285, 0116740, 6597879, 9935588109,   
7072463820, 14134818, 9089576 ####[facility] (DEFAULT)[address] [place] [address]   
   
                                                    Creatinine   
[Mass/Vol]      1.15 mg/dL      Normal          0.90-1.30       [facility]  
   
                                        Comment on above:   Performed By: #### 2  
668895, 2764164, 5019185, 3468573852,   
6285654688, 25636591, 6442865 ####[facility] (DEFAULT)[address] [place] [address]   
   
                      Glucose [Mass/Vol] 112.0 mg/dL Normal     74.0-118.0 [facility]  
   
                                        Comment on above:   Performed By: #### 2  
721225, 4947298, 4146201, 9258264117,   
4137403169, 32030256, 4208034 ####[facility] (DEFAULT)[address] [place] [address]   
   
                                Osmolality      284 mOsm/L      Invalid   
Interpretation Code                                 [facility]  
   
                                        Comment on above:   Performed By: #### 2  
726094, 5789941, 3031650, 1464404340,   
5119332186, 77932820, 9854302 ####[facility] (DEFAULT)[address] [place] [address]   
   
                                                    Potassium   
[Moles/Vol]     3.7 mmol/L      Normal          3.6-5.1         [facility]  
   
                                        Comment on above:   Performed By: #### 2  
176913, 7189201, 2610827, 4407531404,   
8232006978, 71931555, 1894755 ####[facility] (DEFAULT)[address] [place] [address]   
   
                      Sodium [Moles/Vol] 141.0 mmol/L Normal     136.0-144.0 [facility]  
   
                                        Comment on above:   Performed By: #### 2  
335864, 0179724, 5969196, 7544761496,   
5197411444, 93303541, 6651017 ####[facility] (DEFAULT)[address] [place] [address]   
   
                                                    Urea nitrogen   
[Mass/Vol]      19 mg/dL        Normal          8-26            [facility]  
   
                                        Comment on above:   Performed By: #### 2  
738495, 3402100, 9674917, 3572813176,   
0045698654, 09326509, 8699298 ####[facility] (DEFAULT)[address] [place] [address]   
   
                                                    Urea   
nitrogen/Creatinine   
[Mass ratio]    16.5 mg/mg      High            4.6-16.2        [facility]  
   
                                        Comment on above:   Performed By: #### 2  
398147, 0508306, 0052698, 5676501387,   
6172601386, 37527214, 4313019 ####[facility] (DEFAULT)[address] [place] [address]   
   
                                                    CBC w/ Auto Diffon 03-  
4   
   
                                                    Erythrocyte   
distribution width   
(RBC) [Ratio]   15.0 %          Normal          11.5-15.0       [facility]  
   
                                        Comment on above:   Performed By: #### 2  
151853, 9351299, 8636061, 6284710821,   
4488681509, 16528189, 3806565 ####[facility] (DEFAULT)[address] [place], [place] [address]   
   
                                                    Hematocrit (Bld)   
[Volume fraction] 37.4 %          Normal          34.8-51.9       [facility]  
   
                                        Comment on above:   Performed By: #### 2  
410416, 2770935, 6877347, 7535819232,   
1811964970, 34198488, 2833990 ####[facility] (DEFAULT)[address] [place] [address]   
   
                                                    Hemoglobin (Bld)   
[Mass/Vol]      12.5 g/dL       Normal          11.8-17.7       [facility]  
   
                                        Comment on above:   Performed By: #### 2  
723626, 0822009, 0003064, 8376414439,   
6643231724, 23668416, 7923579 ####[facility] (DEFAULT)[address] [place] [address]   
   
                                Man Diff?       Auto            Invalid   
Interpretation Code                                 [facility]  
   
                                        Comment on above:   Performed By: #### 2  
443335, 0029560, 6259573, 8193878028,   
1261461933, 73490930, 7827737 ####[facility] (DEFAULT)[address] [place] [address]   
   
                                                    MCH (RBC) [Entitic   
mass]           30 pg           Normal          24-34           [facility]  
   
                                        Comment on above:   Performed By: #### 2  
470037, 5279343, 6266790, 0554799825,   
4754698798, 56493128, 8062396 ####[facility] (DEFAULT)[address] [place] [address]   
   
                                                    MCHC (RBC)   
[Mass/Vol]      33 g/dL         Normal          26-37           [facility]  
   
                                        Comment on above:   Performed By: #### 2  
214893, 6156552, 1712155, 8993000004,   
0970302088, 92477313, 2122220 ####[facility] (DEFAULT)[address] [place] [address]   
   
                                                    MCV (RBC) [Entitic   
vol]            89 fL           Normal                    [facility]  
   
                                        Comment on above:   Performed By: #### 2  
073960, 5287084, 2240981, 8747063348,   
5919712377, 03883362, 6929293 ####[facility] (DEFAULT)[address] [place] [address]   
   
                      Platelet   134 x10    Low        138-427    [facility]  
   
                                        Comment on above:   Performed By: #### 2  
803027, 8772073, 7701470, 3465939583,   
6382918297, 07302772, 2126728 ####[facility] (DEFAULT)[address] [place] [address]   
   
                                                    Platelet mean volume   
(Bld) [Entitic vol] 8.4 fL          Normal          6.3-10.2        [facility]  
   
                                        Comment on above:   Performed By: #### 2  
386698, 9452343, 2488896, 9572005517,   
2690768269, 82203857, 5525210 ####[facility] (DEFAULT)[address] [place] [address]   
   
                      RBC        4.20 x10   Normal     3.70-5.30  [facility]  
   
                                        Comment on above:   Performed By: #### 2  
670875, 2986377, 5974888, [id number],   
7580064373, 51495638, 9921175 ####[facility] (DEFAULT)[address] [place] [address]   
   
                      WBC        4.5 x10    Normal     3.5-10.5   [facility]  
   
                                        Comment on above:   Performed By: #### 2  
755629, 5170640, 9724670, 2545320332,   
[id number], 03334291, 0814306 ####[facility] (DEFAULT)[address] [place] [address]   
   
                                                    CKon 03-   
   
                                                    CK [Catalytic   
activity/Vol]   470 U/L         High                      [facility]  
   
                                        Comment on above:   Performed By: #### 2  
840520, 6883760, 0238252, 4655404141,   
2254786751, 45916818, 8302455 ####[facility] (DEFAULT)[address] [place] [address]   
   
                                                    CMP Standardon 03-   
   
                      Albumin [Mass/Vol] 2.9 g/dL   Low        3.5-5.0    [facility]  
   
                                        Comment on above:   Performed By: #### 2  
731664, 9212500, 6739682, 3335161170,   
7526051691, 59113955, 5814600 ####[facility] (DEFAULT)[address] [place] [address]   
   
                                                    Albumin/Globulin   
[Mass ratio]    1.1 {ratio}     Low             1.4-2.6         [facility]  
   
                                        Comment on above:   Performed By: #### 2  
378407, 3234145, 2950162, 0265488367,   
1603816275, 55773851, 8972234 ####[facility] (DEFAULT)[address] [place] [address]   
   
                      Alk Phos   50 IU/L    Normal     32-91      [facility]  
   
                                        Comment on above:   Performed By: #### 2  
965606, 7779565, 4539764, 0114822281,   
7299764326, 54474816, 0005833 ####[facility] (DEFAULT)[address] [place] [address]   
   
                                                    ALT [Catalytic   
activity/Vol]   7.0 U/L         Low             17.0-63.0       [facility]  
   
                                        Comment on above:   Performed By: #### 2  
790920, 8962409, 6303790, 7802027896,   
3526223924, 29484302, 6303311 ####[facility] (DEFAULT)[address] [place] [address]   
   
                                                    AST [Catalytic   
activity/Vol]   82 U/L          High            15-41           [facility]  
   
                                        Comment on above:   Performed By: #### 2  
786759, 4949772, 0942513, 6729003621,   
8823728962, 55874607, 8058086 ####[facility] (DEFAULT)[address] [place] [address]   
   
                      Bili Total 0.9 mg/dL  Normal     0.3-1.2    [facility]  
   
                                        Comment on above:   Performed By: #### 2  
138696, 2060177, 3231141, 8500150262,   
3236655140, 21793704, 0206273 ####[facility] (DEFAULT)[address] [place] [address]   
   
                                                    Globulin (S)   
[Mass/Vol]      2.6 g/dL        Normal          1.5-4.3         [facility]  
   
                                        Comment on above:   Performed By: #### 2  
653426, 5086776, 5547554, 6736943863,   
6116010733, 82315110, 9304515 ####[facility] (DEFAULT)[address] [place] [address]   
   
                      Protein [Mass/Vol] 5.5 g/dL   Low        6.5-8.1    [facility]  
   
                                        Comment on above:   Performed By: #### 2  
239853, 5556545, 9995237, 6439355412,   
6482319978, 27438000, 4360445 ####[facility] (DEFAULT)[address] [place] [address]   
   
                                                    CRPon 03-   
   
                      CRP        6.2 mg/dL  High       <=0.5      [facility]  
   
                                        Comment on above:   Performed By: #### 2  
222073, 9869410, 4443916, 8787383702,   
0191046756, 21980429, 0238664 ####[facility] (DEFAULT)[address] [place] [address]   
   
                                                    Myoglobinon 03-   
   
                      Myoglobin [Mass/Vol] 259.9 ng/mL High       17.4-105.7 [facility]  
   
                                        Comment on above:   Performed By: #### 2  
348920, 7588494, 2630176, 9262874203,   
2554053086, 71261514, 2672771 ####[facility] (DEFAULT)[address] [place] [address]   
   
                                                    UA Glxjm5lz 03-   
   
                      UA Amorph. Rare       [facility]  
   
                                        Comment on above:   Order Comment: Urina  
lysis Microscopic order added on by   
SAVO   
Expert Rules system.   
   
                                                            Performed By: #### 6  
548830, 28555512, [id number] ####[facility] (DEFAULT)[address] [place] [address]   
   
                      UA Bacteria Trace      Normal                [facility]  
   
                                        Comment on above:   Order Comment: Urina  
lysis Microscopic order added on by   
SAVO   
[employer]pert Rules system.   
   
                                                            Performed By: #### 6  
129078, 11331382, [id number] ####[facility] (DEFAULT)[address] [place] [address]   
   
                      UA Gran Cast 0-5        [facility]  
   
                                        Comment on above:   Order Comment: Urina  
lysis Microscopic order added on by   
Discern   
Expert Rules system.   
   
                                                            Performed By: #### 6  
979800, 67802230, [id number] ####[facility] (DEFAULT)[address] [place] [address]   
   
                      UA Hyal Cast 0-5        [facility]  
   
                                        Comment on above:   Order Comment: Urina  
lysis Microscopic order added on by   
Discern   
Expert Rules system.   
   
                                                            Performed By: #### 6  
922356, 97092361, [id number] ####[facility] (DEFAULT)[address] [place] [address]   
   
                      UA RBC     20-30      [facility]  
   
                                        Comment on above:   Order Comment: Urina  
lysis Microscopic order added on by   
Discern   
Expert Rules system.   
   
                                                            Performed By: #### 6  
003062, 01867660, [id number] ####[facility] (DEFAULT)[address] [place] [address]   
   
                      UA Squam Epi Rare       [facility]  
   
                                        Comment on above:   Order Comment: Urina  
lysis Microscopic order added on by   
Discern   
Expert Rules system.   
   
                                                            Performed By: #### 6  
061430, 46981176, [id number] ####[facility] (DEFAULT)[address] [place] [address]   
   
                      UA WBC     3-5        [facility]  
   
                                        Comment on above:   Order Comment: Urina  
lysis Microscopic order added on by   
Discern   
Expert Rules system.   
   
                                                            Performed By: #### 6  
114753, 18051877, [id number] ####[facility] (DEFAULT)[address] [place] [address]   
   
                                                    UA w Culture if Ind Standard  
on 03-   
   
                      Breakpoint UA ********   [facility]  
   
                                        Comment on above:   Performed By: #### 6  
638607, 07737065, [id number] ####[facility] (DEFAULT)[address] [place] [address]   
   
                      Color (U)  Yellow     [facility]  
   
                                        Comment on above:   Performed By: #### 6  
856000, 63317556, 9668601509 ####[facility] (DEFAULT)[address] [place] [address]   
   
                                Culture?        Indicated       Invalid   
Interpretation Code                                 [facility]  
   
                                        Comment on above:   Result Comment: Resu  
lt created by rule GL_MAGR_ADD_UA_CULT   
Result   
created by rule GL_MAGR_ADD_UA_CULT Result created by rule   
GL_MAGR_ADD_UA_CULT1 Result created by rule GL_MAGR_ADD_UA_CULT   
   
                                                            Performed By: #### 6  
248040, 58905467, [id number] ####[facility] (DEFAULT)[address] [place] [address]   
   
                                                    Glucose (U)   
[Mass/Vol]      Negative        Normal                          [facility]  
   
                                        Comment on above:   Performed By: #### 6  
562466, 88867373, [id number] ####[facility] (DEFAULT)[address] [place] [address]   
   
                      Ketones Ql (U) Negative   Normal                [facility]  
   
                                        Comment on above:   Performed By: #### 6  
874654, 78139062, [id number] ####[facility] (DEFAULT)[address] [place] [address]   
   
                                Micro?          Indicated       Invalid   
Interpretation Code                                 [facility]  
   
                                        Comment on above:   Result Comment: Resu  
lt created by rule GL_MAGR_ADD_UA_MICRO   
   
                                                            Performed By: #### 6  
701002, 17087898, [id number] ####[facility] (DEFAULT)[address] [place] [address]   
   
                      UA Bilirubin Negative   Normal                [facility]  
   
                                        Comment on above:   Performed By: #### 6  
093060, 73670703, 0581374063 ####[facility] (DEFAULT)[address] [place] [address]   
   
                      UA Blood   MODERATE   Abnormal   NEGATIVE   [facility]  
   
                                        Comment on above:   Performed By: #### 6  
812350, 08364465, [id number] ####[facility] (DEFAULT)[address] [place] [address]   
   
                      UA Clarity CLEAR      Normal     CLEAR      [facility]  
   
                                        Comment on above:   Performed By: #### 6  
406497, 04438408, [id number] ####[facility] (DEFAULT)[address] [place] [address]   
   
                      UA Leuk Est Negative   Normal     NEGATIVE   [facility]  
   
                                        Comment on above:   Performed By: #### 6  
856245, 75780768, [id number] ####[facility] (DEFAULT)[address] [place] [address]   
   
                      UA Nitrite Negative   Normal     NEGATIVE   [facility]  
   
                                        Comment on above:   Performed By: #### 6  
642453, 48354459, 1257172478 ####[facility] (DEFAULT)[address] [place] [address]   
   
                      UA pH      7.5        Normal     5-8        [facility]  
   
                                        Comment on above:   Performed By: #### 6  
844540, 82897460, [id number] ####[facility] (DEFAULT)[address] [place] [address]   
   
                      UA Protein Negative   Normal     NEGATIVE   [facility]  
   
                                        Comment on above:   Performed By: #### 6  
449695, 15637945, [id number] ####[facility] (DEFAULT)[address] [place] [address]   
   
                      UA Spec Grav 1.015      Normal     1.001-1.035 [facility]  
   
                                        Comment on above:   Performed By: #### 6  
697411, 51650866, [id number] ####[facility] (DEFAULT)[address] [place] [address]   
   
                      UA Urobilinogen 0.2 mg/dL  Normal     0.2-1.0    [facility]  
   
                                        Comment on above:   Performed By: #### 6  
967187, 23243877, 1711393801 ####[facility] (DEFAULT)[address] [place] [address]   
   
                      Urine Source Clean Catch Normal                [facility]  
   
                                        Comment on above:   Performed By: #### 6  
999148, 38091404, 8762124480 ####[facility] (DEFAULT)[address] [place] [address]   
   
                                                    XR Humerus Lefton 03-  
   
   
                      XR Humerus Left            Normal                [facility]  
   
                                                    .Auto Diff 1on 2024   
   
                      Auto Mono % 12 %       Normal     -12       [facility]  
   
                                        Comment on above:   Performed By: #### 1  
[id number], 1741828, 3343031, 47743250,   
6475685, 6058143 ####[facility] (DEFAULT)[address] [place] [address]   
   
                      Baso Abs#  0.0 x10    Normal     0.0-0.2    [facility]  
   
                                        Comment on above:   Performed By: #### 1  
[id number], 1860110, 9173633, 78194082,   
4542070, 4427441 ####[facility] (DEFAULT)[address] [place] [address]   
   
                                                    Basophils/100 WBC   
(Bld)           0.2 %           Normal          0.2-2.0         [facility]  
   
                                        Comment on above:   Performed By: #### 1  
[id number], 8960548, 7895021, 41792860,   
3606229, 0289402 ####[facility] (DEFAULT)[address] [place] [address]   
   
                      Eos Abs#   0.0 x10    Normal     0.0-0.4    [facility]  
   
                                        Comment on above:   Performed By: #### 1  
[id number], 9755083, 1325687, 98628411,   
5094499, 6257736 ####[facility] (DEFAULT)[address] [place] [address]   
   
                                                    Eosinophils/100 WBC   
(Bld)           1.0 %           Normal          0.9-4.0         [facility]  
   
                                        Comment on above:   Performed By: #### 1  
[id number], 9454728, 7639048, 13607257,   
6017109, 2904652 ####[facility] (DEFAULT)[address] [place] [address]   
   
                      Lymph Abs# 0.5 x10    Low        1.3-2.9    [facility]  
   
                                        Comment on above:   Performed By: #### 1  
[id number], 9025700, 5361543, 67114935,   
9795459, 1862875 ####[facility] (DEFAULT)[address] [place] [address]   
   
                                                    Lymphocytes/100 WBC   
(Bld)           16 %            Normal          14-48           [facility]  
   
                                        Comment on above:   Performed By: #### 1  
861169062, 3907267, 3365023, 20938927,   
6379561, 7406225 ####[facility] (DEFAULT)[address] [place] [address]   
   
                      Mono Abs#  0.4 x10    Normal     0.0-0.8    [facility]  
   
                                        Comment on above:   Performed By: #### 1  
[id number], 7376625, 9103955, 92649463,   
3286949, 2660040 ####[facility] (DEFAULT)[address] [place] [address]   
   
                      Neut Abs#  2.3 x10    Normal     1.5-9.2    [facility]  
   
                                        Comment on above:   Performed By: #### 1  
182686249, 5353617, 0677672, 47227941,   
6914865, 5929207 ####[facility] (DEFAULT)[address] [place] [address]   
   
                                                    Neutrophils/100 WBC   
(Bld)           71 %            Normal          44-88           [facility]  
   
                                        Comment on above:   Performed By: #### 1  
[id number], 9937317, 9066665, 97535218,   
8284543, 3784919 ####[facility] (DEFAULT)[address] [place] [address]   
   
                                                    CBC w/ Auto Diffon   
4   
   
                                                    Erythrocyte   
distribution width   
(RBC) [Ratio]   15.2 %          High            11.5-15.0       [facility]  
   
                                        Comment on above:   Performed By: #### 1  
[id number], 8466887, 9559880, 03049904,   
4964987, 0746853 ####[facility] (DEFAULT)[address] [place] [address]   
   
                                                    Hematocrit (Bld)   
[Volume fraction] 33.9 %          Low             34.8-51.9       [facility]  
   
                                        Comment on above:   Performed By: #### 1  
[id number], 4164817, 3561668, 24925002,   
7950831, 5985014 ####[facility] (DEFAULT)[address] [place] [address]   
   
                                                    Hemoglobin (Bld)   
[Mass/Vol]      11.5 g/dL       Low             11.8-17.7       [facility]  
   
                                        Comment on above:   Performed By: #### 1  
079660582, 0045367, 8833892, 95234740,   
1275949, 5330856 ####[facility] (DEFAULT)[address] [place] [address]   
   
                                Man Diff?       Auto            Invalid   
Interpretation Code                                 [facility]  
   
                                        Comment on above:   Performed By: #### 1  
[id number], 8877765, 4215282, 39470683,   
7793651, 4759066 ####[facility] (DEFAULT)[address] [place] [address]   
   
                                                    MCH (RBC) [Entitic   
mass]           30 pg           Normal          24-34           [facility]  
   
                                        Comment on above:   Performed By: #### 1  
237412773, 9287613, 6694573, 05426434,   
0536576, 5307384 ####[facility] (DEFAULT)[address] [place] [address]   
   
                                                    MCHC (RBC)   
[Mass/Vol]      34 g/dL         Normal          26-37           [facility]  
   
                                        Comment on above:   Performed By: #### 1  
110567689, 3273146, 5146826, 90752597,   
5494027, 0418277 ####[facility] (DEFAULT)[address] [place] [address]   
   
                                                    MCV (RBC) [Entitic   
vol]            88 fL           Normal                    [facility]  
   
                                        Comment on above:   Performed By: #### 1  
[id number], 8497896, 8755926, 59591409,   
5991851, 1601731 ####[facility] (DEFAULT)[address] [place] [address]   
   
                      Platelet   125 x10    Low        138-427    [facility]  
   
                                        Comment on above:   Performed By: #### 1  
696145381, 7155566, 9939265, 36211044,   
7532666, 4210412 ####[facility] (DEFAULT)[address] [place] [address]   
   
                                                    Platelet mean volume   
(Bld) [Entitic vol] 8.6 fL          Normal          6.3-10.2        [facility]  
   
                                        Comment on above:   Performed By: #### 1  
[id number], 6457562, 8616577, 93671689,   
7710228, 9595215 ####[facility] (DEFAULT)[address] [place] [address]   
   
                      RBC        3.85 x10   Normal     3.70-5.30  [facility]  
   
                                        Comment on above:   Performed By: #### 1  
856055272, 0001333, 3787214, 20633868,   
9268198, 1924176 ####[facility] (DEFAULT)[address] [place] [address]   
   
                      WBC        3.2 x10    Low        3.5-10.5   [facility]  
   
                                        Comment on above:   Performed By: #### 1  
601272917, 2110823, 8973983, 36770376,   
0533042, 0584061 ####[facility] (DEFAULT)[address] [place] [address]   
   
                                                    CKon 2024   
   
                                                    CK [Catalytic   
activity/Vol]   675 U/L         High                      [facility]  
   
                                        Comment on above:   Performed By: #### 1  
[id number], 3046723, 0849144, 97193159,   
7471826, 0472605 ####[facility] (DEFAULT)[address] [place] [address]   
   
                                                    CMP Standardon 2024   
   
                                eGFR Non AA     >60             Invalid   
Interpretation Code                                 [facility]  
   
                                        Comment on above:   Performed By: #### 1  
[id number], 5637094, 6707909, 79169927,   
7254651, 0908215 ####[facility] (DEFAULT)[address] [place] [address]   
   
                                eGFR AA         >60             Invalid   
Interpretation Code                                 [facility]  
   
                                        Comment on above:   Performed By: #### 1  
[id number], 0431410, 5251441, 66763061,   
5855981, 3801620 ####[facility] (DEFAULT)[address] [place] [address]   
   
                      Albumin [Mass/Vol] 2.7 g/dL   Low        3.5-5.0    [facility]  
   
                                        Comment on above:   Performed By: #### 1  
[id number], 3916143, 4452060, 25652208,   
5493873, 4507319 ####[facility] (DEFAULT)[address] [place] [address]   
   
                                                    Albumin/Globulin   
[Mass ratio]    1.1 {ratio}     Low             1.4-2.6         [facility]  
   
                                        Comment on above:   Performed By: #### 1  
[id number], 2011251, 2796078, 40678623,   
0605118, 6247800 ####[facility] (DEFAULT)[address] [place] [address]   
   
                      Alk Phos   47 IU/L    Normal     32-91      [facility]  
   
                                        Comment on above:   Performed By: #### 1  
[id number], 6478378, 3174925, 19101061,   
4650078, 9320405 ####[facility] (DEFAULT)[address] [place] [address]   
   
                                                    ALT [Catalytic   
activity/Vol]   8.0 U/L         Low             17.0-63.0       [facility]  
   
                                        Comment on above:   Performed By: #### 1  
[id number], 6364762, 3311605, 78105661,   
2630701, 0935566 ####[facility] (DEFAULT)[address] [place] [address]   
   
                                                    Anion gap   
[Moles/Vol]     7.8 mmol/L      Normal          5.0-19.0        [facility]  
   
                                        Comment on above:   Performed By: #### 1  
[id number], 8066785, 0630193, 26420372,   
5726576, 1476858 ####[facility] (DEFAULT)[address] [place] [address]   
   
                                                    AST [Catalytic   
activity/Vol]   90 U/L          High            15-41           [facility]  
   
                                        Comment on above:   Performed By: #### 1  
[id number], 5409162, 6388224, 35913794,   
0678009, 4104949 ####[facility] (DEFAULT)[address] [place] [address]   
   
                      Bili Total 0.7 mg/dL  Normal     0.3-1.2    [facility]  
   
                                        Comment on above:   Performed By: #### 1  
[id number], 6516862, 8991197, 70445111,   
0843530, 4178638 ####[facility] (DEFAULT)[address] [place] [address]   
   
                      Calcium [Mass/Vol] 7.9 mg/dL  Low        8.9-10.3   [facility]  
   
                                        Comment on above:   Performed By: #### 1  
[id number], 6605649, 6808431, 22565009,   
7542325, 0521049 ####[facility] (DEFAULT)[address] [place] [address]   
   
                      Chloride [Moles/Vol] 110 mmol/L Normal     101-111    [facility]  
   
                                        Comment on above:   Performed By: #### 1  
[id number], 0131287, 4659843, 23160270,   
6318496, 4071961 ####[facility] (DEFAULT)[address] [place] [address]   
   
                      CO2 [Moles/Vol] 27 mmol/L  Normal     21-32      [facility]  
   
                                        Comment on above:   Performed By: #### 1  
[id number], 6349616, 4033939, 40310760,   
4361337, 2823701 ####[facility] (DEFAULT)[address] [place] [address]   
   
                                                    Creatinine   
[Mass/Vol]      1.13 mg/dL      Normal          0.90-1.30       [facility]  
   
                                        Comment on above:   Performed By: #### 1  
[id number], 7912388, 3082208, 43583033,   
1556708, 9038752 ####[facility] (DEFAULT)[address] [place] [address]   
   
                                                    Globulin (S)   
[Mass/Vol]      2.4 g/dL        Normal          1.5-4.3         [facility]  
   
                                        Comment on above:   Performed By: #### 1  
[id number], 5116868, 9094297, 70543566,   
6280175, 6794836 ####[facility] (DEFAULT)[address] [place] [address]   
   
                      Glucose [Mass/Vol] 101.0 mg/dL Normal     74.0-118.0 [facility]  
   
                                        Comment on above:   Performed By: #### 1  
[id number], 8476756, 8730906, 95086791,   
9363029, 2229366 ####[facility] (DEFAULT)[address] [place] [address]   
   
                                Osmolality      284 mOsm/L      Invalid   
Interpretation Code                                 [facility]  
   
                                        Comment on above:   Performed By: #### 1  
[id number], 8965389, 8671484, 00954184,   
8222034, 8794834 ####[facility] (DEFAULT)[address] [place] [address]   
   
                                                    Potassium   
[Moles/Vol]     3.8 mmol/L      Normal          3.6-5.1         [facility]  
   
                                        Comment on above:   Performed By: #### 1  
[id number], 0399464, 4511888, 15996805,   
3524184, 1945250 ####[facility] (DEFAULT)[address] [place] [address]   
   
                      Protein [Mass/Vol] 5.1 g/dL   Low        6.5-8.1    [facility]  
   
                                        Comment on above:   Performed By: #### 1  
[id number], 1283918, 3843621, 97696606,   
1167149, 1260471 ####[facility] (DEFAULT)[address] [place] [address]   
   
                      Sodium [Moles/Vol] 141.0 mmol/L Normal     136.0-144.0 [facility]  
   
                                        Comment on above:   Performed By: #### 1  
[id number], 6842904, 4453146, 85132359,   
8204630, 1726496 ####[facility] (DEFAULT)[address] [place] [address]   
   
                                                    Urea nitrogen   
[Mass/Vol]      19 mg/dL        Normal          8-26            [facility]  
   
                                        Comment on above:   Performed By: #### 1  
[id number], 5553457, 1538206, 06965793,   
6863723, 2469087 ####[facility] (DEFAULT)[address] [place] [address]   
   
                                                    Urea   
nitrogen/Creatinine   
[Mass ratio]    16.8 mg/mg      High            4.6-16.2        [facility]  
   
                                        Comment on above:   Performed By: #### 1  
[id number], 3312881, 6653652, 61504940,   
6171555, 8709753 ####[facility] (DEFAULT)[address] [place] [address]   
   
                                                    CRPon 2024   
   
                      CRP        5.5 mg/dL  High       <=0.5      [facility]  
   
                                        Comment on above:   Performed By: #### 1  
[id number], 5533556, 8475281, 85861030,   
1877958, 4536713 ####[facility] (DEFAULT)[address] [place] [address]   
   
                                                    Myoglobinon 2024   
   
                      Myoglobin [Mass/Vol] 335.9 ng/mL High       17.4-105.7 [facility]  
   
                                        Comment on above:   Performed By: #### 1  
[id number], 9814765, 9372527, 13816382,   
7370896, 6388904 ####[facility] (DEFAULT)[address] [place] [address]   
   
                                                    .Auto Diff 1on 2024   
   
                      Auto Mono % 13 %       High       -12       [facility]  
   
                                        Comment on above:   Performed By: #### 7  
145413, 5799767, 6259795, 8287976548,   
9652247, 58137249 ####[facility] (DEFAULT)[address] [place] [address]   
   
                      Baso Abs#  0.0 x10    Normal     0.0-0.2    [facility]  
   
                                        Comment on above:   Performed By: #### 7  
660716, 0091349, 2330587, 7149382412,   
0466098, 16526117 ####[facility] (DEFAULT)[address] [place] [address]   
   
                                                    Basophils/100 WBC   
(Bld)           0.3 %           Normal          0.2-2.0         [facility]  
   
                                        Comment on above:   Performed By: #### 7  
515837, 6587380, 3806080, [id number],   
2956279, 86382367 ####[facility] (DEFAULT)[address] [place] [address]   
   
                      Eos Abs#   0.0 x10    Normal     0.0-0.4    [facility]  
   
                                        Comment on above:   Performed By: #### 7  
610291, 0079871, 4965148, 0674718715,   
6947783, 50582493 ####[facility] (DEFAULT)[address] [place] [address]   
   
                                                    Eosinophils/100 WBC   
(Bld)           0.3 %           Low             0.9-4.0         [facility]  
   
                                        Comment on above:   Performed By: #### 7  
289768, 7283573, 1236064, 9198489034,   
5017457, 30060814 ####[facility] (DEFAULT)[address] [place] [address]   
   
                      Lymph Abs# 0.5 x10    Low        1.3-2.9    [facility]  
   
                                        Comment on above:   Performed By: #### 7  
002704, 5411562, 4433026, 9687613794,   
8506035, 12538395 ####[facility] (DEFAULT)[address] [place] [address]   
   
                                                    Lymphocytes/100 WBC   
(Bld)           15 %            Normal          14-48           [facility]  
   
                                        Comment on above:   Performed By: #### 7  
615486, 9400013, 9090545, 9107358794,   
3740492, 04301517 ####[facility] (DEFAULT)[address] [place] [address]   
   
                      Mono Abs#  0.5 x10    Normal     0.0-0.8    [facility]  
   
                                        Comment on above:   Performed By: #### 7  
793552, 1155968, 8180855, 3304096068,   
5160903, 45199321 ####[facility] (DEFAULT)[address] [place] [address]   
   
                      Neut Abs#  2.4 x10    Normal     1.5-9.2    [facility]  
   
                                        Comment on above:   Performed By: #### 7  
074638, 3170512, 6094518, 2426160837,   
2406951, 75115943 ####[facility] (DEFAULT)[address] [place] [address]   
   
                                                    Neutrophils/100 WBC   
(Bld)           71 %            Normal          44-88           [facility]  
   
                                        Comment on above:   Performed By: #### 7  
186414, 8738924, 0918759, 3106649237,   
1517792, 97462339 ####[facility] (DEFAULT)[address] [place] [address]   
   
                                                    CBC w/ Auto Diffon   
4   
   
                                                    Erythrocyte   
distribution width   
(RBC) [Ratio]   15.6 %          High            11.5-15.0       [facility]  
   
                                        Comment on above:   Performed By: #### 7  
035648, 3730215, 3909676, 4058802498,   
5802349, 62979798 ####[facility] (DEFAULT)[address] [place] [address]   
   
                                                    Hematocrit (Bld)   
[Volume fraction] 32.7 %          Low             34.8-51.9       [facility]  
   
                                        Comment on above:   Performed By: #### 7  
321274, 1391561, 6425098, 6430081521,   
2361326, 50438431 ####[facility] (DEFAULT)[address] [place] [address]   
   
                                                    Hemoglobin (Bld)   
[Mass/Vol]      11.0 g/dL       Low             11.8-17.7       [facility]  
   
                                        Comment on above:   Performed By: #### 7  
976059, 1625509, 4150092, 8603176705,   
7047282, 30219885 ####[facility] (DEFAULT)[address] [place] [address]   
   
                                Man Diff?       Auto            Invalid   
Interpretation Code                                 [facility]  
   
                                        Comment on above:   Performed By: #### 7  
479238, 2974581, 0422128, 9879645941,   
8486971, 34696586 ####[facility] (DEFAULT)[address] [place] [address]   
   
                                                    MCH (RBC) [Entitic   
mass]           30 pg           Normal          24-34           [facility]  
   
                                        Comment on above:   Performed By: #### 7  
577894, 5362205, 5255347, 6708987144,   
4144140, 81337214 ####[facility] (DEFAULT)[address] [place] [address]   
   
                                                    MCHC (RBC)   
[Mass/Vol]      34 g/dL         Normal          26-37           [facility]  
   
                                        Comment on above:   Performed By: #### 7  
263677, 0800495, 5369860, 1594950815,   
7639410, 20081611 ####[facility] (DEFAULT)[address] [place] [address]   
   
                                                    MCV (RBC) [Entitic   
vol]            88 fL           Normal                    [facility]  
   
                                        Comment on above:   Performed By: #### 7  
705015, 9748962, 5369066, 8770560233,   
2954416, 29669640 ####[facility] (DEFAULT)[address] [place] [address]   
   
                      Platelet   112 x10    Low        138-427    [facility]  
   
                                        Comment on above:   Performed By: #### 7  
532380, 3388250, 7451271, 6011827032,   
5630237, 51718275 ####[facility] (DEFAULT)[address] [place] [address]   
   
                                                    Platelet mean volume   
(Bld) [Entitic vol] 8.8 fL          Normal          6.3-10.2        [facility]  
   
                                        Comment on above:   Performed By: #### 7  
958691, 2017468, 8856394, 1036211967,   
8438180, 46425826 ####[facility] (DEFAULT)[address] [place] [address]   
   
                      RBC        3.71 x10   Normal     3.70-5.30  [facility]  
   
                                        Comment on above:   Performed By: #### 7  
600831, 4553492, 6050342, 0934548857,   
0308949, 53628755 ####[facility] (DEFAULT)[address] [place] [address]   
   
                      WBC        3.4 x10    Low        3.5-10.5   [facility]  
   
                                        Comment on above:   Performed By: #### 7  
789255, 4841866, 3332043, 1548110350,   
4066561, 15046652 ####[facility] (DEFAULT)[address] [place] [address]   
   
                                                    CKon 2024   
   
                                                    CK [Catalytic   
activity/Vol]   1412 U/L        High                      [facility]  
   
                                        Comment on above:   Performed By: #### 7  
736929, 7425227, 4511154, 3312101987,   
4939812, 51702731 ####[facility] (DEFAULT)[address] [place] [address]   
   
                                                    CMP Standardon 2024   
   
                                eGFR Non AA     55 mL/min/1.73m2 Invalid   
Interpretation Code                                 [facility]  
   
                                        Comment on above:   Performed By: #### 7  
914426, 0537125, 4355717, 3889132166,   
5793115, 51343622 ####[facility] (DEFAULT)[address] [place] [address]   
   
                                eGFR AA         >60             Invalid   
Interpretation Code                                 [facility]  
   
                                        Comment on above:   Performed By: #### 7  
994871, 4004038, 6746199, 2080050489,   
8036867, 14907077 ####[facility] (DEFAULT)[address] [place] [address]   
   
                      Albumin [Mass/Vol] 2.6 g/dL   Low        3.5-5.0    [facility]  
   
                                        Comment on above:   Performed By: #### 7  
120891, 4581855, 3354626, 4008045537,   
7118558, 34268140 ####[facility] (DEFAULT)[address] [place] [address]   
   
                      Alk Phos   46 IU/L    Normal     32-91      [facility]  
   
                                        Comment on above:   Performed By: #### 7  
119331, 5204907, 5122062, 4779423092,   
0919511, 25372775 ####[facility] (DEFAULT)[address] [place] [address]   
   
                                                    ALT [Catalytic   
activity/Vol]   7.0 U/L         Low             17.0-63.0       [facility]  
   
                                        Comment on above:   Performed By: #### 7  
834179, 8552333, 1084974, 4251748730,   
3645714, 10166626 ####[facility] (DEFAULT)[address] [place] [address]   
   
                                                    AST [Catalytic   
activity/Vol]   109 U/L         High            15-41           [facility]  
   
                                        Comment on above:   Performed By: #### 7  
192674, 0805071, 1662324, 8359643429,   
6019385, 19161214 ####[facility] (DEFAULT)[address] [place] [address]   
   
                      Bili Total 0.6 mg/dL  Normal     0.3-1.2    [facility]  
   
                                        Comment on above:   Performed By: #### 7  
113798, 2772257, 6853757, 0579288874,   
8721282, 20169016 ####[facility] (DEFAULT)[address] [place] [address]   
   
                      Calcium [Mass/Vol] 7.7 mg/dL  Low        8.9-10.3   [facility]  
   
                                        Comment on above:   Performed By: #### 7  
624059, 8811728, 6657750, 8248164701,   
8003107, 93545178 ####[facility] (DEFAULT)[address] [place] [address]   
   
                      Chloride [Moles/Vol] 111 mmol/L Normal     101-111    [facility]  
   
                                        Comment on above:   Performed By: #### 7  
326515, 2877455, 1330038, 9663226792,   
0209743, 16259023 ####[facility] (DEFAULT)[address] [place] [address]   
   
                      CO2 [Moles/Vol] 24 mmol/L  Normal     21-32      [facility]  
   
                                        Comment on above:   Performed By: #### 7  
936713, 0663703, 5667962, 7804061127,   
2108041, 81442114 ####[facility] (DEFAULT)[address] [place] [address]   
   
                                                    Creatinine   
[Mass/Vol]      1.24 mg/dL      Normal          0.90-1.30       [facility]  
   
                                        Comment on above:   Performed By: #### 7  
932816, 9397678, 6720086, 3951702774,   
7676639, 04215396 ####[facility] (DEFAULT)[address] [place] [address]   
   
                      Glucose [Mass/Vol] 103.0 mg/dL Normal     74.0-118.0 [facility]  
   
                                        Comment on above:   Performed By: #### 7  
469726, 7773451, 2966977, 9091868862,   
0114244, 48331662 ####[facility] (DEFAULT)[address] [place] [address]   
   
                                                    Potassium   
[Moles/Vol]     3.7 mmol/L      Normal          3.6-5.1         [facility]  
   
                                        Comment on above:   Performed By: #### 7  
377330, 8574367, 5811113, 1050667042,   
3475761, 77415865 ####[facility] (DEFAULT)[address] [place] [address]   
   
                      Protein [Mass/Vol] 5.0 g/dL   Low        6.5-8.1    [facility]  
   
                                        Comment on above:   Performed By: #### 7  
051842, 7691783, 2267549, 9437937920,   
8043348, 14079872 ####[facility] (DEFAULT)[address] [place] [address]   
   
                      Sodium [Moles/Vol] 140.0 mmol/L Normal     136.0-144.0 [facility]  
   
                                        Comment on above:   Performed By: #### 7  
278716, 1070775, 3914475, 3638821269,   
2962612, 32941882 ####[facility] (DEFAULT)[address] [place] [address]   
   
                                                    Urea nitrogen   
[Mass/Vol]      28 mg/dL        High            8-            [facility]  
   
                                        Comment on above:   Performed By: #### 7  
834215, 5685600, 9470186, 7856740950,   
4610728, 97373875 ####[facility] (DEFAULT)[address] [place] [address]   
   
                                                    Albumin/Globulin   
[Mass ratio]    1.0 {ratio}     Low             1.4-2.6         [facility]  
   
                                        Comment on above:   Performed By: #### 7  
101091, 2660635, 8758223, 9115576923,   
4790952, 39135694 ####[facility] (DEFAULT)[address] [place] [address]   
   
                                                    Anion gap   
[Moles/Vol]     8.7 mmol/L      Normal          5.0-19.0        [facility]  
   
                                        Comment on above:   Performed By: #### 7  
887381, 8214587, 5142025, 1570296664,   
8899773, 70173743 ####[facility] (DEFAULT)[address] [place] [address]   
   
                                                    Globulin (S)   
[Mass/Vol]      2.4 g/dL        Normal          1.5-4.3         [facility]  
   
                                        Comment on above:   Performed By: #### 7  
868827, 3965556, 3846240, [id number],   
3634851, 80989386 ####[facility] (DEFAULT)[address] [place] [address]   
   
                                Osmolality      285 mOsm/L      Invalid   
Interpretation Code                                 [facility]  
   
                                        Comment on above:   Performed By: #### 7  
809249, 6525916, 9104189, [id number],   
7273308, 93581786 ####[facility] (DEFAULT)[address] [place] [address]   
   
                                                    Urea   
nitrogen/Creatinine   
[Mass ratio]    22.5 mg/mg      High            4.6-16.2        [facility]  
   
                                        Comment on above:   Performed By: #### 7  
663471, 3926900, 1737773, [id number],   
3711730, 70234132 ####[facility] (DEFAULT)[address] [place] [address]   
   
                                                    CRPon 2024   
   
                      CRP        6.7 mg/dL  High       <=0.5      [facility]  
   
                                        Comment on above:   Performed By: #### 7  
238719, 3901825, 4142241, [id number],   
7740162, 83509626 ####[facility] (DEFAULT)[address] [place] [address]   
   
                                                    Myoglobinon 2024   
   
                      Myoglobin [Mass/Vol] 632.8 ng/mL High       17.4-105.7 [facility]  
   
                                        Comment on above:   Performed By: #### 7  
192416, 0682217, 2672257, 9092108925,   
4052636, 78907226 ####[facility] (DEFAULT)[address] [place] [address]   
   
                                                    Nutrition Noteon 2024   
   
                      Nutrition Note            Normal                [facility]  
   
                                                    POCT Glucose Levelon   
024   
   
                      Glucose [Mass/Vol] 97 mg/dL   Normal          [facility]  
   
                                        Comment on above:   Performed By: #### 4  
[id number] ####[facility]   
(DEFAULT)[address] [place] [address]   
   
                                                    Progress Note - Nurseon    
   
                                                    Progress Note -   
Nurse                           [facility]  
   
                                                    Telemetry Stripson    
   
                                        Telemetry Strips    [id number]  
[id number]  
.00OTGTIFF          [facility]  
   
                                                    .Auto Diff 1on 2024   
   
                      Auto Mono % 13 %       High       -12       [facility]  
   
                                        Comment on above:   Performed By: #### 2  
375959, 9243797, 454057766, 78956260,   
9040649, 0507434, 2207473826 ####[facility] (DEFAULT)[address] [place] [address]   
   
                      Baso Abs#  0.0 x10    Normal     0.0-0.2    [facility]  
   
                                        Comment on above:   Performed By: #### 2  
660607, 7926680, 177704730, 62727975,   
3567725, 0720803, [id number] ####[facility] (DEFAULT)[address] [place] [address]   
   
                                                    Basophils/100 WBC   
(Bld)           0.2 %           Normal          0.2-2.0         [facility]  
   
                                        Comment on above:   Performed By: #### 2  
592344, 0756187, 943365395, 25957210,   
0003001, 3346839, [id number] ####[facility] (DEFAULT)[address] [place] [address]   
   
                      Eos Abs#   0.0 x10    Normal     0.0-0.4    [facility]  
   
                                        Comment on above:   Performed By: #### 2  
877820, 0217645, 009237942, 54339181,   
3639500, 6718153, [id number] ####[facility] (DEFAULT)[address] [place] [address]   
   
                                                    Eosinophils/100 WBC   
(Bld)           0.0 %           Low             0.9-4.0         [facility]  
   
                                        Comment on above:   Performed By: #### 2  
173902, 6139525, 791551543, 10504360,   
6683492, 1246732, [id number] ####[facility] (DEFAULT)[address] [place] [address]   
   
                      Lymph Abs# 0.5 x10    Low        1.3-2.9    [facility]  
   
                                        Comment on above:   Performed By: #### 2  
075781, 4533559, 061453222, 45598949,   
6016226, 7595856, 1937526487 ####[facility] (DEFAULT)[address] [place] [address]   
   
                                                    Lymphocytes/100 WBC   
(Bld)           10 %            Low             14-48           [facility]  
   
                                        Comment on above:   Performed By: #### 2  
431287, 0057565, 820637142, 53471188,   
1094862, 5930995, [id number] ####[facility] (DEFAULT)[address] [place] [address]   
   
                      Mono Abs#  0.6 x10    Normal     0.0-0.8    [facility]  
   
                                        Comment on above:   Performed By: #### 2  
983975, 9695677, 812557052, 88060404,   
8679818, 3040277, [id number] ####[facility] (DEFAULT)[address] [place] [address]   
   
                      Neut Abs#  3.6 x10    Normal     1.5-9.2    [facility]  
   
                                        Comment on above:   Performed By: #### 2  
976773, 3806475, 176738205, 62753469,   
6838462, 7856006, [id number] ####[facility] (DEFAULT)[address] [place] [address]   
   
                                                    Neutrophils/100 WBC   
(Bld)           77 %            Normal          44-88           [facility]  
   
                                        Comment on above:   Performed By: #### 2  
940879, 1036982, 778250884, 98737717,   
4532736, 7885744, [id number] ####[facility] (DEFAULT)[address] [place] [address]   
   
                                                    C Urineon 2024   
   
                      C Urine               Normal                [facility]  
   
                                        Comment on above:   Performed By: #### 1  
[id number], 0764655, 74763581 ####[facility] (DEFAULT)[address] [place] [address]   
   
                                                    CBC w/ Auto Diffon    
   
                                                    Erythrocyte   
distribution width   
(RBC) [Ratio]   15.4 %          High            11.5-15.0       [facility]  
   
                                        Comment on above:   Performed By: #### 2  
077266, 4480153, 002368759, 31581769,   
8216205, 9013968, [id number] ####[facility] (DEFAULT)[address] [place] [address]   
   
                                                    Hematocrit (Bld)   
[Volume fraction] 33.2 %          Low             34.8-51.9       [facility]  
   
                                        Comment on above:   Performed By: #### 2  
661395, 0856407, 804259042, 51580100,   
7755330, 8967728, 0603651460 ####[facility] (DEFAULT)[address] [place] [address]   
   
                                                    Hemoglobin (Bld)   
[Mass/Vol]      11.3 g/dL       Low             11.8-17.7       [facility]  
   
                                        Comment on above:   Performed By: #### 2  
348698, 2343221, 256477840, 92603913,   
2294626, 4728733, 6879357530 ####[facility] (DEFAULT)[address] [place] [address]   
   
                                Man Diff?       Auto            Invalid   
Interpretation Code                                 [facility]  
   
                                        Comment on above:   Performed By: #### 2  
398391, 1155401, 966192174, 60651490,   
9389061, 6648797, [id number] ####[facility] (DEFAULT)[address] [place] [address]   
   
                                                    MCH (RBC) [Entitic   
mass]           30 pg           Normal          24-34           [facility]  
   
                                        Comment on above:   Performed By: #### 2  
535008, 4885500, 206163734, 94188531,   
9798211, 9072947, 1049453336 ####[facility] (DEFAULT)[address] [place] [address]   
   
                                                    MCHC (RBC)   
[Mass/Vol]      34 g/dL         Normal          26-37           [facility]  
   
                                        Comment on above:   Performed By: #### 2  
045013, 8862798, 953284628, 35316444,   
3014145, 6144632, [id number] ####[facility] (DEFAULT)[address] [place] [address]   
   
                                                    MCV (RBC) [Entitic   
vol]            88 fL           Normal                    [facility]  
   
                                        Comment on above:   Performed By: #### 2  
197746, 1963662, 595325857, 54637535,   
6342208, 5453141, 4004484168 ####[facility] (DEFAULT)[address] [place] [address]   
   
                      Platelet   112 x10    Low        138-427    [facility]  
   
                                        Comment on above:   Performed By: #### 2  
123721, 8355472, 449053251, 95175342,   
5221220, 0494862, 6708958529 ####[facility] (DEFAULT)[address] [place] [address]   
   
                                                    Platelet mean volume   
(Bld) [Entitic vol] 8.6 fL          Normal          6.3-10.2        [facility]  
   
                                        Comment on above:   Performed By: #### 2  
899218, 3946962, 225408877, 76127241,   
6712937, 2584490, [id number] ####[facility] (DEFAULT)[address] [place] [address]   
   
                      RBC        3.77 x10   Normal     3.70-5.30  [facility]  
   
                                        Comment on above:   Performed By: #### 2  
618905, 8097241, 526509822, 51827584,   
0892047, 0564371, 1860763971 ####[facility] (DEFAULT)[address] [place] [address]   
   
                      WBC        4.7 x10    Normal     3.5-10.5   [facility]  
   
                                        Comment on above:   Performed By: #### 2  
134849, 2849161, 569952296, 68968628,   
2928048, 9336996, 2750817184 ####[facility] (DEFAULT)[address] [place] [address]   
   
                                                    CKon 2024   
   
                                                    CK [Catalytic   
activity/Vol]   2005 U/L        High                      [facility]  
   
                                        Comment on above:   Performed By: #### 2  
616367, 4549396, 280829943, 00281942,   
1790525, 2914855, [id number] ####[facility] (DEFAULT)[address] [place] [address]   
   
                                                    CMP Standardon 2024   
   
                                                    Anion gap   
[Moles/Vol]     8.6 mmol/L      Normal          5.0-19.0        [facility]  
   
                                        Comment on above:   Performed By: #### 2  
812978, 6231529, 840827469, 86359564,   
0484535, 6679664, 3470507553 ####[facility] (DEFAULT)[address] [place] [address]   
   
                      Calcium [Mass/Vol] 7.6 mg/dL  Low        8.9-10.3   [facility]  
   
                                        Comment on above:   Performed By: #### 2  
942193, 8988062, 091542365, 95185492,   
0609751, 3219303, 3547063450 ####[facility] (DEFAULT)[address] [place] [address]   
   
                      Chloride [Moles/Vol] 111 mmol/L Normal     101-111    [facility]  
   
                                        Comment on above:   Performed By: #### 2  
589256, 3443976, 741273851, 12387882,   
4401539, 5693098, 2228028391 ####[facility] (DEFAULT)[address] [place] [address]   
   
                      CO2 [Moles/Vol] 23 mmol/L  Normal     21-32      [facility]  
   
                                        Comment on above:   Performed By: #### 2  
736373, 3699338, 148968274, 80220391,   
5595807, 4357433, 7271918194 ####[facility] (DEFAULT)[address] [place] [address]   
   
                      Glucose [Mass/Vol] 106.0 mg/dL Normal     74.0-118.0 [facility]  
   
                                        Comment on above:   Performed By: #### 2  
803926, 7860797, 544039382, 48358217,   
7033191, 8223966, 9738651356 ####[facility] (DEFAULT)[address] [place] [address]   
   
                                Osmolality      284 mOsm/L      Invalid   
Interpretation Code                                 [facility]  
   
                                        Comment on above:   Performed By: #### 2  
801824, 2850160, 275169749, 84187849,   
8546448, 5455849, 8943998941 ####[facility] (DEFAULT)[address] [place] [address]   
   
                                                    Potassium   
[Moles/Vol]     3.6 mmol/L      Normal          3.6-5.1         [facility]  
   
                                        Comment on above:   Performed By: #### 2  
087945, 6959929, 238664887, 72504202,   
3245131, 5620600, 4680051693 ####[facility] (DEFAULT)[address] [place] [address]   
   
                      Sodium [Moles/Vol] 139.0 mmol/L Normal     136.0-144.0 [facility]  
   
                                        Comment on above:   Performed By: #### 2  
680921, 8132851, 350941112, 10775138,   
4948048, 2123404, 9341850875 ####[facility] (DEFAULT)[address] [place] [address]   
   
                                eGFR Non AA     44 mL/min/1.73m2 Invalid   
Interpretation Code                                 [facility]  
   
                                        Comment on above:   Performed By: #### 2  
811067, 5630678, 461113856, 55758576,   
7161210, 9672274, 1303882223 ####[facility] (DEFAULT)[address] [place] [address]   
   
                                eGFR AA         54 mL/min/1.73m2 Invalid   
Interpretation Code                                 [facility]  
   
                                        Comment on above:   Performed By: #### 2  
318702, 4613718, 171697868, 34263292,   
8592992, 7585546, [id number] ####[facility] (DEFAULT)[address] [place] [address]   
   
                      Albumin [Mass/Vol] 2.7 g/dL   Low        3.5-5.0    [facility]  
   
                                        Comment on above:   Performed By: #### 2  
361807, 1632812, 239585681, 89006742,   
5516918, 6414815, 1927214259 ####[facility] (DEFAULT)[address] [place] [address]   
   
                                                    Albumin/Globulin   
[Mass ratio]    1.2 {ratio}     Low             1.4-2.6         [facility]  
   
                                        Comment on above:   Performed By: #### 2  
401100, 2663556, 026914271, 22759578,   
2750901, 7591683, 1178368735 ####[facility] (DEFAULT)[address] [place] [address]   
   
                      Alk Phos   48 IU/L    Normal     32-91      [facility]  
   
                                        Comment on above:   Performed By: #### 2  
078025, 8939610, 766056725, 02511271,   
0969551, 6205121, [id number] ####[facility] (DEFAULT)[address] [place] [address]   
   
                                                    ALT [Catalytic   
activity/Vol]   7.0 U/L         Low             17.0-63.0       [facility]  
   
                                        Comment on above:   Performed By: #### 2  
995788, 6538846, 965429447, 66849396,   
8096487, 6684833, 3101029404 ####[facility] (DEFAULT)[address] [place] [address]   
   
                                                    AST [Catalytic   
activity/Vol]   128 U/L         High            15-41           [facility]  
   
                                        Comment on above:   Performed By: #### 2  
834984, 1952739, 422545282, 49738536,   
1821558, 6820972, 3522845456 ####[facility] (DEFAULT)[address] [place] [address]   
   
                      Bili Total 0.6 mg/dL  Normal     0.3-1.2    [facility]  
   
                                        Comment on above:   Performed By: #### 2  
962917, 3162901, 603377418, 76469527,   
9941683, 6843241, [id number] ####[facility] (DEFAULT)[address] [place] [address]   
   
                                                    Creatinine   
[Mass/Vol]      1.50 mg/dL      High            0.90-1.30       [facility]  
   
                                        Comment on above:   Performed By: #### 2  
544422, 6072749, 510584588, 01969270,   
7984227, 6921596, 8158793057 ####[facility] (DEFAULT)[address] [place] [address]   
   
                                                    Globulin (S)   
[Mass/Vol]      2.2 g/dL        Normal          1.5-4.3         [facility]  
   
                                        Comment on above:   Performed By: #### 2  
263911, 9917585, 952549577, 42490342,   
8040529, 9978656, 9478419991 ####[facility] (DEFAULT)[address] [place] [address]   
   
                      Protein [Mass/Vol] 4.9 g/dL   Low        6.5-8.1    [facility]  
   
                                        Comment on above:   Performed By: #### 2  
542787, 9865424, 930160034, 81253258,   
8518126, 1594939, 6723732653 ####[facility] (DEFAULT)[address] [place] [address]   
   
                                                    Urea nitrogen   
[Mass/Vol]      30 mg/dL        High            8-26            [facility]  
   
                                        Comment on above:   Performed By: #### 2  
143483, 8145373, 483055046, 10761940,   
7493112, 4137897, 8237293353 ####[facility] (DEFAULT)[address] [place] [address]   
   
                                                    Urea   
nitrogen/Creatinine   
[Mass ratio]    20.0 mg/mg      High            4.6-16.2        [facility]  
   
                                        Comment on above:   Performed By: #### 2  
923081, 4444278, 038829978, 62263610,   
6900939, 3351530, [id number] ####[facility] (DEFAULT)[address] [place] [address]   
   
                                                    CRPon 2024   
   
                      CRP        8.4 mg/dL  High       <=0.5      [facility]  
   
                                        Comment on above:   Performed By: #### 2  
708405, 3113042, 305932854, 35072505,   
5045049, 1736144, [id number] ####[facility] (DEFAULT)[address] [place] [address]   
   
                                                    Myoglobinon 2024   
   
                      Myoglobin [Mass/Vol] 1491.6 ng/mL High       17.4-105.7 [facility]  
   
                                        Comment on above:   Performed By: #### 2  
506243, 1071309, 945048515, 25956604,   
7318053, 3721275, [id number] ####[facility] (DEFAULT)[address] [place] [address]   
   
                                                    Procalcitoninon 2024   
   
                      Procalcitonin 1.226 ng/mL High       0.500-1.000 [facility]  
   
                                        Comment on above:   Performed By: #### 2  
253387, 9905541, 051392866, 52941964,   
9957635, 4364796, [id number] ####[facility] (DEFAULT)[address] [place] [address]   
   
                                                    Telemetry Stripson    
   
                                        Telemetry Strips    [id number]  
[id number]  
.00OTGTIFF          Normal                                  [facility]  
   
                                                    .Auto Diff 1on 2024   
   
                      Auto Mono % 10 %       Normal     1-12       [facility]  
   
                                        Comment on above:   Performed By: #### 5  
[id number], 9133516, 7106201, 1525488,   
5193617991, 0370989, 23888026 ####[facility] (DEFAULT)[address] [place] [address]   
   
                      Baso Abs#  0.0 x10    Normal     0.0-0.2    [facility]  
   
                                        Comment on above:   Performed By: #### 5  
914643948, 7116940, 3220310, 9640169,   
9279074612, 7279375, 46615541 ####[facility] (DEFAULT)[address] [place] [address]   
   
                                                    Basophils/100 WBC   
(Bld)           0.5 %           Normal          0.2-2.0         [facility]  
   
                                        Comment on above:   Performed By: #### 5  
637313020, 9023681, 5030730, 0450133,   
5100954208, 0771364, 29858597 ####[facility] (DEFAULT)[address] [place] [address]   
   
                      Eos Abs#   0.0 x10    Normal     0.0-0.4    [facility]  
   
                                        Comment on above:   Performed By: #### 5  
374235043, 6817585, 7840709, 8779918,   
5092224759, 3769016, 29345889 ####[facility] (DEFAULT)[address] [place] [address]   
   
                                                    Eosinophils/100 WBC   
(Bld)           0.0 %           Low             0.9-4.0         [facility]  
   
                                        Comment on above:   Performed By: #### 5  
138317485, 9846747, 3275579, 9490238,   
8907310613, 6510568, 43797286 ####[facility] (DEFAULT)[address] [place] [address]   
   
                      Lymph Abs# 0.5 x10    Low        1.3-2.9    [facility]  
   
                                        Comment on above:   Performed By: #### 5  
562295075, 1451586, 8578944, 6851760,   
3393715189, 1453410, 18256753 ####[facility] [facility] (DEFAULT)[address] [place] [address]   
   
                                                    Lymphocytes/100 WBC   
(Bld)           7 %             Low             14-48           [facility]  
   
                                        Comment on above:   Performed By: #### 5  
[id number], 6734619, 4076497, 0953587,   
8074264720, 8503885, 27293911 ####[facility] (DEFAULT)[address] [place] [address]   
   
                      Mono Abs#  0.8 x10    Normal     0.0-0.8    [facility]  
   
                                        Comment on above:   Performed By: #### 5  
[id number], 8451397, 4577474, 0746552,   
9358640308, 1031485, 15533511 ####[facility] (DEFAULT)[address] [place] [address]   
   
                      Neut Abs#  6.8 x10    Normal     1.5-9.2    [facility]  
   
                                        Comment on above:   Performed By: #### 5  
[id number], 9568324, 6234987, 2358566,   
4534242271, 4758802, 75413531 ####[facility] (DEFAULT)[address] [place] [address]   
   
                                                    Neutrophils/100 WBC   
(Bld)           83 %            Normal          44-88           [facility]  
   
                                        Comment on above:   Performed By: #### 5  
[id number], 6568243, 3724033, 1366714,   
1724292801, 4274624, 69083007 ####[facility] (DEFAULT)[address] [place] [address]   
   
                                                    CBC w/ Auto Diffon   
4   
   
                                                    Erythrocyte   
distribution width   
(RBC) [Ratio]   15.6 %          High            11.5-15.0       [facility]  
   
                                        Comment on above:   Performed By: #### 5  
[id number], 0553606, 8391290, 9236045,   
7034813151, 9591158, 19739428 ####[facility] (DEFAULT)[address] [place] [address]   
   
                                                    Hematocrit (Bld)   
[Volume fraction] 38.2 %          Normal          34.8-51.9       [facility]  
   
                                        Comment on above:   Performed By: #### 5  
[id number], 6393036, 7756276, 7371940,   
7934405926, 2997712, 79372841 ####[facility] (DEFAULT)[address] [place] [address]   
   
                                                    Hemoglobin (Bld)   
[Mass/Vol]      12.8 g/dL       Normal          11.8-17.7       [facility]  
   
                                        Comment on above:   Performed By: #### 5  
[id number], 4323804, 6788931, 5276837,   
6162291975, 5298017, 90514619 ####[facility] (DEFAULT)[address] [place] [address]   
   
                                Man Diff?       Auto            Invalid   
Interpretation Code                                 [facility]  
   
                                        Comment on above:   Performed By: #### 5  
[id number], 0065456, 5385560, 3985409,   
0695490638, 8738368, 34055581 ####[facility] (DEFAULT)[address] [place] [address]   
   
                                                    MCH (RBC) [Entitic   
mass]           30 pg           Normal          24-34           [facility]  
   
                                        Comment on above:   Performed By: #### 5  
[id number], 0438513, 5825729, 8679290,   
4650250720, 2106746, 90751918 ####[facility] (DEFAULT)[address] [place] [address]   
   
                                                    MCHC (RBC)   
[Mass/Vol]      34 g/dL         Normal          26-37           [facility]  
   
                                        Comment on above:   Performed By: #### 5  
[id number], 3084803, 1604013, 8603100,   
1244441456, 3396272, 39397914 ####[facility] (DEFAULT)[address] [place] [address]   
   
                                                    MCV (RBC) [Entitic   
vol]            88 fL           Normal                    [facility]  
   
                                        Comment on above:   Performed By: #### 5  
[id number], 1416658, 5516200, 7588133,   
4403740776, 3657929, 16643928 ####[facility] (DEFAULT)[address] [place] [address]   
   
                      Platelet   132 x10    Low        138-427    [facility]  
   
                                        Comment on above:   Performed By: #### 5  
485096587, 0967710, 4301169, 6547830,   
4932684634, 1637898, 48422159 ####[facility] (DEFAULT)[address] [place] [address]   
   
                                                    Platelet mean volume   
(Bld) [Entitic vol] 8.7 fL          Normal          6.3-10.2        [facility]  
   
                                        Comment on above:   Performed By: #### 5  
[id number], 8284919, 6466885, 5207566,   
6337575818, 2799759, 26619494 ####[facility] (DEFAULT)[address] [place] [address]   
   
                      RBC        4.32 x10   Normal     3.70-5.30  [facility]  
   
                                        Comment on above:   Performed By: #### 5  
013853075, 5351124, 6006375, 1426663,   
8817331327, 3231734, 75636207 ####[facility] (DEFAULT)[address] [place] [address]   
   
                      WBC        8.2 x10    Normal     3.5-10.5   [facility]  
   
                                        Comment on above:   Performed By: #### 5  
[id number], 0492277, 4233779, 1762740,   
1756066334, 3251419, 33471726 ####[facility] (DEFAULT)[address] [place] [address]   
   
                                                    CKon 2024   
   
                                                    CK [Catalytic   
activity/Vol]   2963 U/L        High                      [facility]  
   
                                        Comment on above:   Performed By: #### 5  
[id number], 7043665, 9399307, 3091792,   
8790625368, 3223047, 68551996 ####[facility] (DEFAULT)[address] [place] [address]   
   
                                                    CMP Standardon 2024   
   
                                eGFR Non AA     44 mL/min/1.73m2 Invalid   
Interpretation Code                                 [facility]  
   
                                        Comment on above:   Performed By: #### 5  
[id number], 6659157, 4981687, 5848240,   
9510097607, 5371712, 53374740 ####[facility] (DEFAULT)[address] [place] [address]   
   
                                eGFR AA         54 mL/min/1.73m2 Invalid   
Interpretation Code                                 [facility]  
   
                                        Comment on above:   Performed By: #### 5  
[id number], 4590091, 9630958, 5677472,   
7455570072, 9597189, 88980074 ####[facility] (DEFAULT)[address] [place] [address]   
   
                      Albumin [Mass/Vol] 3.0 g/dL   Low        3.5-5.0    [facility]  
   
                                        Comment on above:   Performed By: #### 5  
197130205, 3815942, 3316375, 9023877,   
1324776070, 1102853, 69613827 ####[facility] (DEFAULT)[address] [place] [address]   
   
                                                    Albumin/Globulin   
[Mass ratio]    1.1 {ratio}     Low             1.4-2.6         [facility]  
   
                                        Comment on above:   Performed By: #### 5  
360910046, 3326615, 2689239, 3979882,   
4320584322, 7837792, 43831672 ####[facility] (DEFAULT)[address] [place] [address]   
   
                      Alk Phos   60 IU/L    Normal     32-91      [facility]  
   
                                        Comment on above:   Performed By: #### 5  
504278027, 8802655, 7178265, 2010624,   
6936834763, 2068138, 47323690 ####[facility] (DEFAULT)[address] [place] [address]   
   
                                                    ALT [Catalytic   
activity/Vol]   50.0 U/L        Normal          17.0-63.0       [facility]  
   
                                        Comment on above:   Performed By: #### 5  
465547298, 6101362, 0069138, 6278267,   
5964403722, 7544454, 70306985 ####[facility] (DEFAULT)[address] [place] [address]   
   
                                                    Anion gap   
[Moles/Vol]     7.6 mmol/L      Normal          5.0-19.0        [facility]  
   
                                        Comment on above:   Performed By: #### 5  
145159759, 5174608, 0990553, 0125070,   
1209056061, 5519479, 46946139 ####[facility] (DEFAULT)[address] [place] [address]   
   
                                                    AST [Catalytic   
activity/Vol]   158 U/L         High            15-41           [facility]  
   
                                        Comment on above:   Performed By: #### 5  
[id number], 5610170, 3401905, 6222478,   
6725667824, 5743070, 78128670 ####[facility] (DEFAULT)[address] [place] [address]   
   
                      Bili Total 0.9 mg/dL  Normal     0.3-1.2    [facility]  
   
                                        Comment on above:   Performed By: #### 5  
141204240, 2675377, 3571688, 4100015,   
1362319756, 4872605, 70058114 ####[facility] (DEFAULT)[address] [place] [address]   
   
                      Calcium [Mass/Vol] 7.8 mg/dL  Low        8.9-10.3   [facility]  
   
                                        Comment on above:   Performed By: #### 5  
311960534, 0014797, 2746816, 4623576,   
9234025065, 3996066, 32625123 ####[facility] (DEFAULT)[address] [place] [address]   
   
                      Chloride [Moles/Vol] 111 mmol/L Normal     101-111    [facility]  
   
                                        Comment on above:   Performed By: #### 5  
160016472, 4921160, 3293360, 5705032,   
3482667115, 0697115, 53397902 ####[facility] (DEFAULT)[address] [place] [address]   
   
                      CO2 [Moles/Vol] 22 mmol/L  Normal     21-32      [facility]  
   
                                        Comment on above:   Performed By: #### 5  
504163688, 2797857, 3143882, 1964336,   
3236850892, 0095065, 57861726 ####[facility] (DEFAULT)[address] [place] [address]   
   
                                                    Creatinine   
[Mass/Vol]      1.50 mg/dL      High            0.90-1.30       [facility]  
   
                                        Comment on above:   Performed By: #### 5  
657052770, 2331804, 5055044, 7963941,   
4564466248, 8235353, 76547881 ####[facility] (DEFAULT)[address] [place] [address]   
   
                                                    Globulin (S)   
[Mass/Vol]      2.6 g/dL        Normal          1.5-4.3         [facility]  
   
                                        Comment on above:   Performed By: #### 5  
589787763, 2934742, 2775765, 9738008,   
1749560384, 1981543, 91096391 ####[facility] (DEFAULT)[address] [place] [address]   
   
                      Glucose [Mass/Vol] 128.0 mg/dL High       74.0-118.0 [facility]  
   
                                        Comment on above:   Performed By: #### 5  
102036217, 2251517, 2289858, 5730666,   
5405291370, 1447743, 46952818 ####[facility] (DEFAULT)[address] [place] [address]   
   
                                Osmolality      283 mOsm/L      Invalid   
Interpretation Code                                 [facility]  
   
                                        Comment on above:   Performed By: #### 5  
[id number], 5126773, 5018230, 5007306,   
0712820585, 1204035, 98060140 ####[facility] (DEFAULT)[address] [place] [address]   
   
                                                    Potassium   
[Moles/Vol]     3.6 mmol/L      Normal          3.6-5.1         [facility]  
   
                                        Comment on above:   Performed By: #### 5  
432109871, 1944356, 8608448, 9411505,   
8891727968, 6953840, 12084274 ####[facility] (DEFAULT)[address] [place] [address]   
   
                      Protein [Mass/Vol] 5.6 g/dL   Low        6.5-8.1    [facility]  
   
                                        Comment on above:   Performed By: #### 5  
824225349, 8483167, 6728341, 6556295,   
0776882095, 8307793, 86672125 ####[facility] (DEFAULT)[address] [place] [address]   
   
                      Sodium [Moles/Vol] 137.0 mmol/L Normal     136.0-144.0 [facility]  
   
                                        Comment on above:   Performed By: #### 5  
809104655, 1944806, 7432001, 3197190,   
5358871631, 0993984, 92477139 ####[facility] (DEFAULT)[address] [place] [address]   
   
                                                    Urea nitrogen   
[Mass/Vol]      33 mg/dL        High            8-26            [facility]  
   
                                        Comment on above:   Performed By: #### 5  
064501615, 6459276, 5619203, 7572466,   
4123119243, 4894515, 49091701 ####[facility] (DEFAULT)[address] [place] [address]   
   
                                                    Urea   
nitrogen/Creatinine   
[Mass ratio]    22.0 mg/mg      High            4.6-16.2        [facility]  
   
                                        Comment on above:   Performed By: #### 5  
956922773, 3031360, 3043502, 5742428,   
8212824220, 1750046, 06250337 ####[facility] (DEFAULT)[address] [place] [address]   
   
                                                    Magnesiumon 2024   
   
                      Magnesium [Mass/Vol] 1.80 mg/dL Normal     1.80-2.50  [facility]  
   
                                        Comment on above:   Performed By: #### 5  
231171029, 3303598, 2774475, 4553907,   
1774685652, 4241136, 97889507 ####[facility] (DEFAULT)[address] [place] [address]   
   
                                                    Myoglobinon 2024   
   
                      Myoglobin [Mass/Vol] 3674.6 ng/mL High       17.4-105.7 [facility]  
   
                                        Comment on above:   Performed By: #### 5  
245498366, 8687188, 2436889, 2273928,   
9696495720, 2023714, 18373021 ####[facility] (DEFAULT)[address] [place] [address]   
   
                                                    Nutrition Noteon 2024   
   
                      Nutrition Note            Normal                [facility]  
   
                                                    Pharmacy Noteon 2024   
   
                      Pharmacy Note            Normal                [facility]  
   
                                                    Telemetry Stripson    
   
                                        Telemetry Strips    [id number]  
[id number]  
.[id number]          Normal                                  [facility]  
   
                                                    TnI HSon 2024   
   
                                                    Troponin I High   
Sensitivity     466.0 pg/mL     Critically abnormal <=20.0          [facility]  
   
                                        Comment on above:   Result Comment: Crit  
ical result TNIHS 466.0 pg/mL called to   
and   
read back by caprice colmenares at 06-Mar-2024 05:14 by roman.   
   
                                                            Performed By: #### 5  
[id number], 7070213, 3142838, 9821072,   
5940370620, 0296000, 10472682 ####[facility] (DEFAULT)[address] [place] [address]   
   
                                                    .Auto Diff 1on 2024   
   
                      Auto Mono % 9 %        Normal     -12       [facility]  
   
                                        Comment on above:   Performed By: #### 2  
006269, 96661047, 6152584476, 1715529,   
3001604, 4132560, 862517075, 9992641258, 7042444, 8893031859,   
[id number] ####[facility] (DEFAULT)[address] [place] [address]   
   
                      Baso Abs#  0.0 x10    Normal     0.0-0.2    [facility]  
   
                                        Comment on above:   Performed By: #### 2  
988283, 79058185, 1461457066, 3100053,   
8326483, 6971648, 892686324, 1043265926, 7187035, 9662565020,   
[id number] ####[facility] (DEFAULT)[address] [place], [place] [address]   
   
                                                    Basophils/100 WBC   
(Bld)           0.1 %           Low             0.2-2.0         [facility]  
   
                                        Comment on above:   Performed By: #### 2  
562287, 33472805, 3689709880, 9747745,   
6075280, 7128963, 684042885, 2593276510, 4026035, 1679258564,   
[id number] ####[facility] (DEFAULT)[address] [place] [address]   
   
                      Eos Abs#   0.0 x10    Normal     0.0-0.4    [facility]  
   
                                        Comment on above:   Performed By: #### 2  
624297, 89848293, 4606154644, 8351515,   
4181527, 0157278, 548925002, 1257157852, 5681011, 3922947853,   
[id number] ####[facility] (DEFAULT)[address] [place] [address]   
   
                                                    Eosinophils/100 WBC   
(Bld)           0.0 %           Low             0.9-4.0         [facility]  
   
                                        Comment on above:   Performed By: #### 2  
191763, 68857726, 6661983968, 7408503,   
2302443, 4830571, 221168557, 6631061412, 5039607, 4262747115,   
[id number] ####[facility] (DEFAULT)[address] [place] [address]   
   
                      Lymph Abs# 0.3 x10    Low        1.3-2.9    [facility]  
   
                                        Comment on above:   Performed By: #### 2  
582443, 85530914, 7732226477, 2847781,   
1270693, 8249794, 700474763, 4246194878, 5317376, 0464345766,   
[id number] ####[facility] (DEFAULT)[address] [place] [address]   
   
                                                    Lymphocytes/100 WBC   
(Bld)           3 %             Low             14-48           [facility]  
   
                                        Comment on above:   Performed By: #### 2  
793456, 92329053, 1980584912, 3597267,   
8278113, 2973113, 419747099, 3973101803, 1049816, 7189105784,   
[id number] ####[facility] (DEFAULT)[address] [place] [address]   
   
                      Mono Abs#  1.0 x10    High       0.0-0.8    [facility]  
   
                                        Comment on above:   Performed By: #### 2  
837383, 69759540, 9639072386, 2528501,   
3314619, 1895507, 899404171, 3326734796, 4130031, 0584830468,   
[id number] ####[facility] (DEFAULT)[address] [place] [address]   
   
                      Neut Abs#  9.5 x10    High       1.5-9.2    [facility]  
   
                                        Comment on above:   Performed By: #### 2  
315588, 28240396, 2844545639, 6649854,   
1885981, 2305252, 013900061, 2602238332, 5946535, 5421984505,   
[id number] ####[facility] (DEFAULT)[address] [place] [address]   
   
                                                    Neutrophils/100 WBC   
(Bld)           88 %            Normal          44-88           [facility]  
   
                                        Comment on above:   Performed By: #### 2  
839848, 28869489, 9016906412, 2277848,   
3463131, 3823292, 425326974, 1903004867, 9969055, 1477023348,   
[id number] ####[facility] (DEFAULT)[address] [place] [address]   
   
                                                    .QC SARS-CoV-2 (COVID-19)/Fl  
u/RSV ([employer])on 2024   
   
                      Internal Control Pass       Normal                [facility]  
   
                                        Comment on above:   Order Comment: Order  
ed by [employer].[[email or web address]]   
   
                                                            Performed By: #### 7  
[id number], [id number] ####[facility]   
(DEFAULT)[address] [place] [address]   
   
                                                    CBC w/ Auto Diffon    
   
                                                    Erythrocyte   
distribution width   
(RBC) [Ratio]   15.2 %          High            11.5-15.0       [facility]  
   
                                        Comment on above:   Performed By: #### 2  
113661, 32216481, 8904097530, 8302151,   
4638088, 9788702, 288843783, 4111033544, 6969668, 0015179354,   
[id number] ####[facility] (DEFAULT)[address] [place] [address]   
   
                                                    Hematocrit (Bld)   
[Volume fraction] 44.0 %          Normal          34.8-51.9       [facility]  
   
                                        Comment on above:   Performed By: #### 2  
462657, 50013645, 3373690493, 4850840,   
2408249, 0779108, 596931327, 0406280133, 4572867, 8038472439,   
[id number] ####[facility] (DEFAULT)[address] [place] [address]   
   
                                                    Hemoglobin (Bld)   
[Mass/Vol]      14.7 g/dL       Normal          11.8-17.7       [facility]  
   
                                        Comment on above:   Performed By: #### 2  
043230, 64540835, 5794756966, 4658306,   
9550484, 0831064, 334153233, 5803915192, 2307249, 6173647712,   
[id number] ####[facility] (DEFAULT)[address] [place] [address]   
   
                                Man Diff?       Auto            Invalid   
Interpretation Code                                 [facility]  
   
                                        Comment on above:   Performed By: #### 2  
570408, 52087360, [id number], 0658847,   
6208919, 8020757, 884824757, 0241464208, 6115197, 4279937495,   
[id number] ####[facility] (DEFAULT)[address] [place] [address]   
   
                                                    MCH (RBC) [Entitic   
mass]           30 pg           Normal          24-34           [facility]  
   
                                        Comment on above:   Performed By: #### 2  
568542, 43195769, 1648411785, 6398481,   
9837221, 2936380, 627016471, 6941263883, 0512849, 5155221181,   
[id number] ####[facility] (DEFAULT)[address] [place] [address]   
   
                                                    MCHC (RBC)   
[Mass/Vol]      33 g/dL         Normal          26-37           [facility]  
   
                                        Comment on above:   Performed By: #### 2  
918583, 80810195, 5782044171, 2468722,   
1334019, 8634721, 764010164, 3758191819, 8308935, 4099956739,   
[id number] ####[facility] (DEFAULT)[address] [place] [address]   
   
                                                    MCV (RBC) [Entitic   
vol]            89 fL           Normal                    [facility]  
   
                                        Comment on above:   Performed By: #### 2  
358989, 42383290, 3041237295, 4450049,   
6547199, 9493356, 802075287, 8338955789, 5561891, 6566166869,   
[id number] ####[facility] (DEFAULT)[address] [place] [address]   
   
                      Platelet   173 x10    Normal     138-427    [facility]  
   
                                        Comment on above:   Performed By: #### 2  
062167, 54130141, 1485003296, 2336618,   
0921079, 7935510, 287706505, 3750980076, 5191338, 2183676228,   
[id number] ####[facility] (DEFAULT)[address] [place] [address]   
   
                                                    Platelet mean volume   
(Bld) [Entitic vol] 7.9 fL          Normal          6.3-10.2        [facility]  
   
                                        Comment on above:   Performed By: #### 2  
720984, 72791531, 1856515827, 6662991,   
0681270, 2892620, 141914281, 9691225083, 1708671, 8029604588,   
[id number] ####[facility] (DEFAULT)[address] [place] [address]   
   
                      RBC        4.95 x10   Normal     3.70-5.30  [facility]  
   
                                        Comment on above:   Performed By: #### 2  
578779, 80070360, 1663542083, 6325976,   
6115287, 0912418, 166493344, 6832852175, 0013782, 4075676788,   
[id number] ####[facility] (DEFAULT)[address] [place] [address]   
   
                      WBC        10.7 x10   High       3.5-10.5   [facility]  
   
                                        Comment on above:   Performed By: #### 2  
810597, 46813495, 6978988247, 9040102,   
7474440, 4740674, 639270769, 1050528301, 6784737, 0216725544,   
[id number] ####[facility] (DEFAULT)[address] [place] [address]   
   
                                                    CKon 2024   
   
                      Total CK   >4100      High            [facility]  
   
                                        Comment on above:   Result Comment: Veri  
fied by dilution   
   
                                                            Performed By: #### 2  
384528, 07457243, 3121436558, 8110635,   
8904157, 1800973, 316755759, 5264861373, 6908478, 1334912995,   
[id number] ####[facility] (DEFAULT)[address] [place] [address]   
   
                                                    CMP Standardon 2024   
   
                                eGFR Non AA     49 mL/min/1.73m2 Invalid   
Interpretation Code                                 [facility]  
   
                                        Comment on above:   Performed By: #### 2  
241810, 05496192, 1204556528, 4353804,   
9731390, 2017159, 841723123, 3161015117, 9646465, 3869464603,   
[id number] ####[facility] (DEFAULT)[address] [place] [address]   
   
                                eGFR AA         59 mL/min/1.73m2 Invalid   
Interpretation Code                                 [facility]  
   
                                        Comment on above:   Performed By: #### 2  
532470, 85135119, 7458112335, 6035636,   
2072815, 6669033, 557236500, 1941030060, 7532550, 4180202663,   
[id number] ####[facility] (DEFAULT)[address] [place] [address]   
   
                      Albumin [Mass/Vol] 4.2 g/dL   Normal     3.5-5.0    [facility]  
   
                                        Comment on above:   Performed By: #### 2  
866688, 23897078, 4878399424, 4157846,   
2208705, 9295183, 943992162, 4132362590, 0771664, 9007005999,   
[id number] ####[facility] (DEFAULT)[address] [place] [address]   
   
                      Alk Phos   81 IU/L    Normal     32-91      [facility]  
   
                                        Comment on above:   Performed By: #### 2  
163421, 40962082, 6256235999, 8443233,   
3191397, 0009173, 510929748, 4242889856, 9216502, 4788060403,   
[id number] ####[facility] (DEFAULT)[address] [place] [address]   
   
                                                    ALT [Catalytic   
activity/Vol]   61.0 U/L        Normal          17.0-63.0       [facility]  
   
                                        Comment on above:   Performed By: #### 2  
959139, 16907744, 2203759426, 3878942,   
5982181, 1863486, 149984605, 7866279332, 2040197, 2691127661,   
[id number] ####[facility] (DEFAULT)[address] [place] [address]   
   
                                                    AST [Catalytic   
activity/Vol]   150 U/L         High            15-41           [facility]  
   
                                        Comment on above:   Performed By: #### 2  
003555, 46659725, 1423715465, 1816196,   
8307543, 5842902, 597247098, 0292185590, 8593790, 8403164045,   
[id number] ####[facility] (DEFAULT)[address] [place] [address]   
   
                      Bili Total 1.1 mg/dL  Normal     0.3-1.2    [facility]  
   
                                        Comment on above:   Performed By: #### 2  
158404, 17708768, 4764458069, 6430080,   
3889763, 9805857, 106552457, 9230175146, 5058844, 3041965504,   
[id number] ####[facility] (DEFAULT)[address] [place] [address]   
   
                      Calcium [Mass/Vol] 8.8 mg/dL  Low        8.9-10.3   [facility]  
   
                                        Comment on above:   Performed By: #### 2  
311995, 52484950, 3037442460, 6134717,   
5191377, 0539351, 959912002, 6718563778, 8437341, 8666954444,   
[id number] ####[facility] (DEFAULT)[address] [place] [address]   
   
                      Chloride [Moles/Vol] 105 mmol/L Normal     101-111    [facility]  
   
                                        Comment on above:   Performed By: #### 2  
952171, 31375942, 3099252887, 4514077,   
0062500, 7311705, 920868976, 1243924629, 5874257, 5553152900,   
[id number] ####[facility] (DEFAULT)[address] [place] [address]   
   
                      CO2 [Moles/Vol] 26 mmol/L  Normal     21-32      [facility]  
   
                                        Comment on above:   Performed By: #### 2  
071998, 18879378, 5126996696, 8823562,   
8989583, 2598406, 205623534, 1992173808, 6394157, 1003368462,   
[id number] ####[facility] (DEFAULT)[address] [place] [address]   
   
                                                    Creatinine   
[Mass/Vol]      1.37 mg/dL      High            0.90-1.30       [facility]  
   
                                        Comment on above:   Performed By: #### 2  
098585, 10916329, 3502101459, 0529864,   
2323977, 1765785, 657254583, 5005006997, 4681744, 8415928016,   
[id number] ####[facility] (DEFAULT)[address] [place] [address]   
   
                      Glucose [Mass/Vol] 124.0 mg/dL High       74.0-118.0 [facility]  
   
                                        Comment on above:   Performed By: #### 2  
514241, 00538856, 4432662546, 0607980,   
2473295, 3677403, 300200142, 9295533862, 7071300, 0991180085,   
[id number] ####[facility] (DEFAULT)[address] [place] [address]   
   
                                                    Potassium   
[Moles/Vol]     3.8 mmol/L      Normal          3.6-5.1         [facility]  
   
                                        Comment on above:   Performed By: #### 2  
442461, 93390198, 5916454734, 8552456,   
6358873, 6351834, 706482661, 7800798755, 1963834, 9681169381,   
[id number] ####[facility] (DEFAULT)[address] [place] [address]   
   
                      Protein [Mass/Vol] 7.4 g/dL   Normal     6.5-8.1    [facility]  
   
                                        Comment on above:   Performed By: #### 2  
612926, 96442185, 0248671513, 4274467,   
9630843, 8815838, 643858477, 6760504144, 8101564, 0377579810,   
[id number] ####[facility] (DEFAULT)[address] [place] [address]   
   
                      Sodium [Moles/Vol] 140.0 mmol/L Normal     136.0-144.0 [facility]  
   
                                        Comment on above:   Performed By: #### 2  
018527, 67244468, 6433394342, 6589240,   
1462175, 0261868, 127028216, 2367359302, 2459254, 8430913761,   
[id number] ####[facility] (DEFAULT)[address] [place] [address]   
   
                                                    Urea nitrogen   
[Mass/Vol]      31 mg/dL        High            8-26            [facility]  
   
                                        Comment on above:   Performed By: #### 2  
493114, 86811315, 9491612054, 6294935,   
8876645, 8872610, 276587997, 0598822860, 6574427, 9398786182,   
[id number] ####[facility] (DEFAULT)[address] [place] [address]   
   
                                                    Albumin/Globulin   
[Mass ratio]    1.3 {ratio}     Low             1.4-2.6         [facility]  
   
                                        Comment on above:   Performed By: #### 2  
985311, 71488838, 6300385607, 0332100,   
9511387, 7426837, 885706435, 2588516948, 3576746, 1810167829,   
[id number] ####[facility] (DEFAULT)[address] [place] [address]   
   
                                                    Anion gap   
[Moles/Vol]     12.8 mmol/L     Normal          5.0-19.0        [facility]  
   
                                        Comment on above:   Performed By: #### 2  
383450, 58100528, 9639273806, 0428297,   
7794015, 1549601, 961632223, 7952592948, 8326164, 5915430898,   
[id number] ####[facility] (DEFAULT)[address] [place] [address]   
   
                                                    Globulin (S)   
[Mass/Vol]      3.2 g/dL        Normal          1.5-4.3         [facility]  
   
                                        Comment on above:   Performed By: #### 2  
758687, 37213269, 9902953864, 2461141,   
6080140, 7917681, 870342127, 4299260722, 6407481, 6678975716,   
[id number] ####[facility] (DEFAULT)[address] [place] [address]   
   
                                Osmolality      287 mOsm/L      Invalid   
Interpretation Code                                 [facility]  
   
                                        Comment on above:   Performed By: #### 2  
341528, 02239186, 4189062964, 4226762,   
1689507, 1771523, 062452061, 0464297957, 3407285, 4612386853,   
[id number] ####[facility] (DEFAULT)[address] [place] [address]   
   
                                                    Urea   
nitrogen/Creatinine   
[Mass ratio]    22.6 mg/mg      High            4.6-16.2        [facility]  
   
                                        Comment on above:   Performed By: #### 2  
492479, 66994407, 5342185463, 0120095,   
3863120, 4755765, 209719478, 7448734543, 8976583, 7734749022,   
[id number] ####[facility] (DEFAULT)[address] [place] [address]   
   
                                                    COVID/Flu/RSV (GeneXpert)on   
2024   
   
                                                    Flu A (GXpert   
COVFLURSV)      Positive        Normal          Negative        [facility]  
   
                                        Comment on above:   Performed By: #### 7  
[id number], [id number] ####[facility]   
(DEFAULT)[address] [place] [address]   
   
                                                    Flu B (GXpert   
COVFLURSV)      Negative        Normal          Negative        [facility]  
   
                                        Comment on above:   Performed By: #### 7  
597265647, [id number] ####[facility]   
(DEFAULT)[address] [place] [address]   
   
                                                    RSV (GXpert   
COVFLURSV)      Negative        Normal          Negative        [facility]  
   
                                        Comment on above:   Performed By: #### 7  
852008989, [id number] ####[facility]   
(DEFAULT)[address] [place] [address]   
   
                                                    SARS-CoV-2   
(COVID-19) RNA   
MIRIAM+probe Ql (Unsp   
spec)           Negative        Normal          Negative        [facility]  
   
                                        Comment on above:   Result Comment: Perf  
ormed by PCR methodology.   
   
                                                            Performed By: #### 7  
[id number], [id number] ####[facility]   
(DEFAULT)[address] [place] [address]   
   
                                                    CRPon 2024   
   
                      CRP        8.3 mg/dL  High       <=0.5      [facility]  
   
                                        Comment on above:   Performed By: #### 2  
637530, 23140012, 1717378091, 7000101,   
6453727, 9034131, 190751738, 9168643323, 3565973, 7912663715,   
[id number] ####[facility] (DEFAULT)[address] [place] [address]   
   
                                                    CT Head or Brain w/o Contras  
ton 2024   
   
                                                    CT Head or Brain w/o   
Contrast                        Normal                          [facility]  
   
                                                    CT Maxillofacial w/o Contras  
ton 2024   
   
                                                    CT Maxillofacial w/o   
Contrast                        [facility]  
   
                                                    CT Spine Cervical w/o Contra  
ston 2024   
   
                                                    CT Spine Cervical   
w/o Contrast                    [facility]  
   
                                                    ED Clinical Summaryon 2024   
   
                      ED Clinical Summary            Normal                [facility]  
   
                                                    ED Note - Physicianon 2024   
   
                      ED Note - Physician            [facility]  
   
                                                    ED Note-Nursingon 2024  
   
   
                                        ED Note-Nursing     Patient admitted to   
the floor,   
hospitalist to   
review cultures.  
Electronically   
Signed by: Lindsey Montgomery on 2024   
16:06 EST           [facility]  
   
                                        ED Note-Nursing     16fr alvarado catheter   
inserted without   
difficulty. 10ml of   
cloudy, thick,   
sediment urine came   
through the   
catheter, no further   
urine output at this   
time  
Electronically   
Signed by: Tamika Handy on   
2024 11:48 EST [facility]  
   
                                        ED Note-Nursing     Dr. ANGELIC mckeon.  
Electronically   
Signed by: Ya Parson on 2024   
10:58 EST           [facility]  
   
                                        ED Note-Nursing     Dr. Rand   
notified that repeat   
CK was needed per   
lab. Lab states that   
CK at this time was   
reading > 4100.   
REsults pending 30   
minutes.  
Electronically   
Signed by: Ya Parson on 2024   
10:16 EST           [facility]  
   
                      ED Note-Nursing            [facility]  
   
                                                    ED Patient Education Noteon   
2024   
   
                                                    ED Patient Education   
Note            [facility]  
   
                                                    ED Patient Summaryon   
024   
   
                      ED Patient Summary            [facility]  
   
                                                    Extra Redon 2024   
   
                                Tube Collected  Yes             Invalid   
Interpretation Code                                 [facility]  
   
                                        Comment on above:   Performed By: #### 2  
983875, 19022697, 5535164524, 1654001,   
0220248, 0339285, 770632986, 8845348501, 8451384, 9375703124,   
[id number] ####[facility] (DEFAULT)[address] [place] [address]   
   
                                                    Myoglobinon 2024   
   
                      Myoglobin [Mass/Vol] ng/mL      High       17.4-105.7 [facility]  
   
                                        Comment on above:   Performed By: #### 2  
765557, 42434692, 6204856164, 9063549,   
7564683, 6828093, 694614273, 4770872735, 3118489, 9613147812,   
[id number] ####[facility] (DEFAULT)[address] [place] [address]   
   
                                                    Procalcitoninon 2024   
   
                      Procalcitonin 1.776 ng/mL High       0.500-1.000 [facility]  
   
                                        Comment on above:   Performed By: #### 7  
[id number] ####[facility]   
(DEFAULT)[address] [place] [address]   
   
                                                    TSH w/ Reflex to FT4on    
   
                      TSH Qn     1.83 m[IU]/L Normal     0.45-5.33  [facility]  
   
                                        Comment on above:   Performed By: #### 2  
679213, 22087540, 6590967815, 2893047,   
3002352, 5329495, 068671754, 7464469727, 2685237, 4615657624,   
[id number] ####[facility] (DEFAULT)[address] [place] [address]   
   
                                                    TnI HSon 2024   
   
                                                    Troponin I High   
Sensitivity     594.1 pg/mL     Critically abnormal <=20.0          [facility]  
   
                                        Comment on above:   Result Comment: Crit  
ical result TNIHS 594.1 pg/mL called to   
and   
read back by Nabila Terrell at 05-Mar-2024 23:08 by Betsy.   
   
                                                            Performed By: #### 5  
[id number] ####[facility] (DEFAULT)[address] [place] [address]   
   
                                                    Troponin I High   
Sensitivity     610.4 pg/mL     Critically abnormal <=20.0          [facility]  
   
                                        Comment on above:   Result Comment: Crit  
ical result TNIHS 610.4 pg/mL called to   
and   
read back by Adalid Wheeler at 05-Mar-2024 15:44 by Betsy.   
   
                                                            Performed By: #### 5  
[id number] ####[facility] (DEFAULT)[address] [place] [address]   
   
                                                    Troponin I High   
Sensitivity     485.2 pg/mL     Critically abnormal <=20.0          [facility]  
   
                                        Comment on above:   Result Comment: Crit  
ical result TNIHS 485.2 pg/mL called to   
and   
read back by Migdalia Parson RN  at 05-Mar-2024 10:03 by   
Amaury.   
   
                                                            Performed By: #### 2  
918265, 66841939, 9445851641, 2135858,   
7730137, 7996597, 739822709, 5485461334, 8419193, 3873151909,   
[id number] ####[facility] (DEFAULT)[address] [place] [address]   
   
                                                    UA Ewcgr6cm 2024   
   
                      UA Amorph. Few        [facility]  
   
                                        Comment on above:   Order Comment: Urina  
lysis Microscopic order added on by   
SAVO   
Expert Rules system.   
   
                                                            Performed By: #### 1  
[id number], 9523580, [id number] ####[facility] (DEFAULT)[address] [place] [address]   
   
                      UA Bacteria 3+         [facility]  
   
                                        Comment on above:   Order Comment: Urina  
lysis Microscopic order added on by   
Discern   
Expert Rules system.   
   
                                                            Performed By: #### 1  
[id number], 2284448, [id number] ####[facility] (DEFAULT)[address] [place] [address]   
   
                      UA RBC     5-10       [facility]  
   
                                        Comment on above:   Order Comment: Urina  
lysis Microscopic order added on by   
SAVO   
Expert Rules system.   
   
                                                            Performed By: #### 1  
[id number], 7771935, [id number] ####[facility] (DEFAULT)[address] [place] [address]   
   
                      UA Squam Epi Rare       [facility]  
   
                                        Comment on above:   Order Comment: Urina  
lysis Microscopic order added on by   
SAVO   
Expert Rules system.   
   
                                                            Performed By: #### 1  
[id number], 1072987, [id number] ####[facility] (DEFAULT)[address] [place] [address]   
   
                      UA WBC     0-2        [facility]  
   
                                        Comment on above:   Order Comment: Urina  
lysis Microscopic order added on by   
SAVO   
Expert Rules system.   
   
                                                            Performed By: #### 1  
[id number], [id number], [id number] ####[facility] (DEFAULT)[address] [place] [address]   
   
                                                    UA w Culture if Ind Standard  
on 2024   
   
                      Breakpoint UA ********   [facility]  
   
                                        Comment on above:   Performed By: #### 1  
[id number], [id number], [id number] ####[facility] (DEFAULT)[address] [place] [address]   
   
                      Color (U)  Yellow     [facility]  
   
                                        Comment on above:   Performed By: #### 1  
[id number], [id number], [id number] ####[facility] (DEFAULT)[address] [place] [address]   
   
                      Culture?   Yes        Normal                [facility]  
   
                                        Comment on above:   Result Comment: Resu  
lt created by rule GL_MAGR_ADD_UA_CULT1   
Result created by rule GL_MAGR_ADD_UA_CULT1   
   
                                                            Performed By: #### 1  
[id number], [id number], [id number] ####[facility] (DEFAULT)[address] [place] [address]   
   
                                                    Glucose (U)   
[Mass/Vol]      100 mg/dL       Normal                          [facility]  
   
                                        Comment on above:   Performed By: #### 1  
[id number], [id number], [id number] ####[facility] (DEFAULT)[address] [place] [address]   
   
                      Ketones Ql (U) TRACE      Normal                [facility]  
   
                                        Comment on above:   Performed By: #### 1  
[id number], [id number], [id number] ####[facility] (DEFAULT)[address] [place] [address]   
   
                                Micro?          Indicated       Invalid   
Interpretation Code                                 [facility]  
   
                                        Comment on above:   Result Comment: Resu  
lt created by rule GL_MAGR_ADD_UA_MICRO   
   
                                                            Performed By: #### 1  
[id number], [id number], [id number] ####[facility] (DEFAULT)[address] [place] [address]   
   
                      UA Bilirubin Negative   Normal                [facility]  
   
                                        Comment on above:   Performed By: #### 1  
[id number], [id number], [id number] ####[facility] (DEFAULT)[address] [place] [address]   
   
                      UA Blood   LARGE      Abnormal   NEGATIVE   [facility]  
   
                                        Comment on above:   Performed By: #### 1  
[id number], [id number], [id number] ####[facility] (DEFAULT)[address] [place] [address]   
   
                      UA Clarity SL CLOUDY  Abnormal   CLEAR      [facility]  
   
                                        Comment on above:   Performed By: #### 1  
[id number], [id number], [id number] ####[facility] (DEFAULT)[address] [place] [address]   
   
                      UA Leuk Est TRACE      Abnormal   NEGATIVE   [facility]  
   
                                        Comment on above:   Performed By: #### 1  
[id number], [id number], [id number] ####[facility] (DEFAULT)[address] [place] [address]   
   
                      UA Nitrite Negative   Normal     NEGATIVE   [facility]  
   
                                        Comment on above:   Performed By: #### 1  
[id number], [id number], [id number] ####[facility] (DEFAULT)[address] [place] [address]   
   
                      UA pH      5.5        Normal     5-8        [facility]  
   
                                        Comment on above:   Performed By: #### 1  
[id number], [id number], [id number] ####[facility] (DEFAULT)[address] [place] [address]   
   
                      UA Protein 100        Abnormal   NEGATIVE   [facility]  
   
                                        Comment on above:   Performed By: #### 1  
[id number], [id number], [id number] ####[facility] (DEFAULT)[address] [place] [address]   
   
                      UA Spec Grav >=1.030    Normal     1.001-1.035 [facility]  
   
                                        Comment on above:   Performed By: #### 1  
[id number], [id number], [id number] ####[facility] (DEFAULT)[address] [place] [address]   
   
                      UA Urobilinogen 0.2 mg/dL  Normal     0.2-1.0    [facility]  
   
                                        Comment on above:   Performed By: #### 1  
[id number], [id number], [id number] ####[facility] (DEFAULT)[address] [place] [address]   
   
                      Urine Source Clean Catch Normal                [facility]  
   
                                        Comment on above:   Performed By: #### 1  
[id number], [id number], [id number] ####[facility] (DEFAULT)[address] [place] [address]   
   
                                                    US Echocardiogram Completeon  
 2024   
   
                                                    US Echocardiogram   
Complete                        Normal                          [facility]  
   
                                                    Vit B12 Lvlon 2024   
   
                      Vit B12    321        Normal     180-914    [facility]  
   
                                        Comment on above:   Performed By: #### 2  
[id number], [id number], [id number], [id number],   
[id number], [id number], [id number], [id number], [id number], [id number],   
[id number] ####[facility] (DEFAULT)[address] [place] [address]   
   
                                                    XR Chest 1 View Frontalon    
   
                                                    XR Chest 1 View   
Frontal                         [facility]  
   
                                                    XR Pelvis 1 or 2 Viewson    
   
                                                    XR Pelvis 1 or 2   
Views                           [facility]  
   
                                                    Outside Recordson 2023  
   
   
                                        Outside Records     [id number]  
[id number]  
[facility]  
   
                                                    Coding Summaryon 2023   
   
                      Coding Summary            [facility]  
   
                                                    Office/Clinic Noteon   
023   
   
                      Office/Clinic Note            [facility]  
   
                                                    ED Clinical Summaryon 2023   
   
                      ED Clinical Summary            [facility]  
   
                                                    ED Note - Physicianon 2023   
   
                      ED Note - Physician            [facility]  
   
                                                    ED Patient Education Noteon   
2023   
   
                                                    ED Patient Education   
Note                            [facility]  
   
                                                    ED Patient Summaryon   
023   
   
                      ED Patient Summary            [facility]  
   
                                                    Outside Recordson 10-  
   
   
                                        Outside Records     [id number]  
[id number]  
#1.00OTGTIFF        [facility]  
   
                                                    Outside Recordson 10-  
   
   
                                        Outside Records     [id number]  
[id number]  
1.00OTGTIFF         [facility]  
   
                                                    Outside Recordson 2023  
   
   
                                        Outside Records     [id number]  
[id number]  
#1.00OTGTIFF        [facility]  
   
                                                    XR ANKLE RIGHT STANDARDon    
   
                                                    XR ANKLE RIGHT   
STANDARD                                Radiology exam is   
complete. No   
Radiologist   
dictation. Please   
follow up with   
ordering provider.  
  
  
  
  
Final result        Normal                                  [facility]  
  
  
  
Encounters  
  
  
                          Encounter Date Encounter Type Care Provider Facility  
   
                          Start: 10- ambulatory   Reny Blanton Facility  
:[facility]  
   
                                                    Start: 2024  
End: 2024     ambulatory          Reny Blanton      Facility[facility]  
   
                                                    Start: 2024  
End: 2024                         Patient encounter   
procedure                               Reny Blanton  
Work Phone:   
[phone]                           [facility]   
[facility]  
Work Phone:   
[phone]  
   
                                                    Start: 2024  
End: 2024     ambulatory          BHAVIN EARL           Not Available  
   
                                                    Start: 2024  
End: 2024                         Evaluation and   
management of inpatient   Bryon Arzola             Facility:[facility]  
   
                                                    Start: 2024  
End: 2024     ambulatory          Bryon CASTLE Dariusz       Facility:[facility]  
   
                                                    Start: 2024  
End: 2024                         Emergency department   
patient visit             Bryon Arzola             Facility:[facility]  
   
                                                    Start: 2024  
End: 2024     ambulatory          Max MAYTE Faizan      Facility:[facility]  
   
                                                    Start: 2024  
End: 2024     ambulatory          Bryon Arzola       [facility]  
IC  
   
                                                    Start: 2024  
End: 2024     ambulatory          [facility]   
[facility]  
   
                                                    Start: 2024  
End: 2024     ambulatory          Bryon Arzola       Facility[facility]  
IC  
   
                          Start: 2024 ambulatory   [facility]   
[facility]  
   
                          Start: 2024 Telephone encounter [facility]   
Cardiology  
   
                                        Comment on above:   Hospital Follow-up   
   
                                                    Start: 2024  
End: 2024                         Evaluation and   
management of inpatient   Bryon Arzola             Facility:[facility]  
   
                                                    Start: 2024  
End: 2024                         Emergency department   
patient visit             Bryon Arzola             Facility:[facility]  
   
                                                    Start: 2023  
End: 2023     ambulatory          Bryon Arzola       [facility]ility[facility]  
IC  
   
                                                    Start: 2023  
End: 2023                         Emergency department   
patient visit             Bryon CASTLE Dariusz             Facility:[facility]  
  
  
  
Procedures  
  
  
                          Date         Procedure    Procedure Detail Performing   
Clinician  
   
                          Start: 2024 Follow-up visit Follow-up    WILLIAM   
[facility]  
   
                                        Start: 2020   Cystourethroscopy wi  
th   
dilation of urethral   
stricture                                           Reny Blanton  
Work Phone:   
[phone]  
   
                                        Start: 2019   Cystourethroscopy wi  
th   
dilation of urethral   
stricture                                           Reny Blanton  
Work Phone:   
[phone]  
   
                                        Comment on above:   2009, 3/1/2011,  
 2013, 10/17/2013, 10/17/2013,   
2014,   
2015, 10/13/2015, 2016, 10/17/2017, 2018, 2019   
   
                                        Start: 2011   Cystourethroscopy wi  
th   
internal male urethrotomy                           Reny Blanton  
Work Phone:   
[phone]  
   
                                        Comment on above:   2009, 20  
11   
   
                                       Cholecystectomy              Reny Galea  
Work Phone:   
[phone]  
   
                                       Partial resection of colon              A  
juan Rocaea  
Work Phone:   
[phone]  
   
                                       Teleradiotherapy procedure              A  
lyslinda Galea  
Work Phone:   
[phone]  
   
                                        Comment on above:      
   
                                       Transurethral prostatectomy                
Reny Blanton  
Work Phone:   
[phone]  
   
                                        Comment on above:   Dr. Combs   
  
  
  
Plan of Treatment  
  
  
                          Date         Care Activity Detail       Author  
   
                                                    Start:   
2023          [facility]flu[facility] Vacc[facility]   [facility]  
   
                                                    Start:   
10-          Fall Risk Screening [facility]  
   
                                                    Start:   
10-                              Administration of   
varicella zoster vaccine                Zoster (Shingles)   
Vaccine (1 of 2)                        [facility]  
   
                                                    Start:   
10-                              DTaP,Tdap and Td Vaccines   
(1 - Tdap)                              DTaP,Tdap and Td   
Vaccines (1 - Tdap)                     [facility]  
   
                                                    Start:   
10-          Adult BMI Screening Adult [facility] Screening [facility]  
   
                                                    Start:   
10-          [facility]  
   
                                                    Start:   
10-          Tobacco Screening   Tobacco Screening   [facility]  
   
                                                    Start:   
1934                              Medicare Annual Wellness   
Visit                                   Medicare Annual   
Wellness Visit                          [facility]  
  
  
  
Payers  
  
  
                          Date         Payer Category Payer        Policy ID  
   
                          2019   Medicare                  [id number]  
   
                                2016      Private Health Insurance HUMANA   
COMMERCIAL HUMANA   
COMMERCIAL [id number]   
2016-Present po box   
[id number] [place] [address]               [id number]  
[id number]  
   
                          2016   Private Health Insurance              [id number]  
[id number]  
   
                                10-      Medicare        MEDICARE MEDICAR  
[address]   
[id number]   
10/1/2000-Present   
[phone] [address] [address]   
[place] [address]                  [id number]  
[id number]  
   
                          10-   Medicare                  [id number]  
   
                          1934   Unknown                   [id number]   
[id number]  
[id number]  
   
                          1935   Unknown                   [id number]   
[id number]  
[id number]  
   
                          1935   Unknown                   [id number]   
[id number]  
[id number]  
   
                          1935   Unknown                   [id number]   
[id number]  
[id number]  
   
                          1935   Unknown                   [id number]   
[id number]  
[id number]  
   
                          1935   Unknown                   [id number]   
[id number]  
[id number]  
   
                          1935   Unknown                   [id number]   
[id number]  
[id number]  
   
                          1934   Unknown                   [id number]   
[id number]  
[id number]  
   
                          1934   Unknown                   [id number]   
[id number]  
[id number]  
   
                          1935   Unknown                   [id number]   
[id number]  
[id number]  
   
                          1934   Unknown                   [id number]   
[id number]  
[id number]  
   
                          1934   Unknown                   [id number]   
[id number]  
[id number]  
   
                          1934   Unknown                   [id number]   
[id number]  
[id number]  
   
                          1935   Unknown                   [id number]   
[id number]  
[id number]  
   
                          1934   Unknown                   [id number]   
[id number]  
[id number]  
  
  
  
Social History  
  
  
                          Date         Type         Detail       Facility  
   
                                                            Tobacco smoking stat  
[facility]                                    Tobacco smoking   
consumption unknown                     [facility]   
System  
   
                                        Start: 03-   History of Social   
function                                            [facility]   
System  
   
                          Start: 03- Childcare                 [facility]  
   
                                       Childcare    Unknown      [facility]   
System  
   
                          Start: 1934 Sex Assigned At Birth Not on file  P  
[facility]  
   
                                Start: 2020 Tobacco smoking status Never s  
moked tobacco   
(finding)                               [facility]  
  
  
  
Clinical Notes 2024 to 2024  
Telephone Encounter - Rosa Hernandez - 2024 8:47 AM EDTTelephone Encounter  
 - Kalpana Gu MA - 2024 8:47 AM EDTTelephone Encounter - Kalpana Gu MA - 2024 8:47 AM EDT  
  
                                Note Date & Type Note            Facility  
   
                                        2024 Miscellaneous Notes Formattin  
g of this note might   
be different from the   
original.  
Message from the 3/8/24   
discharge list per [facility].  
He signed off patient care   
from .  
Dx Elevated Troponin  
Patient to f/u in 1 to 2 weeks   
post d/c  
bvb  
Electronically signed by Rosa Hernandez at 2024 8:48   
AM EDT  
Formatting of this note might   
be different from the   
original.  
Called pt to schedule f/u appt   
from recent IP stay. No   
Answer. No Machine. UCHE  
Electronically signed by   
Kalpana Gu MA at   
2024 10:18 AM EDT  
Formatting of this note might   
be different from the   
original.  
Pt currently at [facility], [phone], Called to   
scheduled f/u appt, No Answer.   
LMOM. JLW  
Electronically signed by   
Kalpana Gu MA at   
2024 1:10 PM EDT  
documented in this encounter            [facility]  
   
                                                    2024 Telephone encount  
er   
Note                                    Formatting of this note might   
be different from the   
original.  
Message from the 3/8/24   
discharge list per [facility].  
He signed off patient care   
from .  
Dx Elevated Troponin  
Patient to f/u in 1 to 2 weeks   
post d/c  
bvb  
Electronically signed by Rosa Hernandez at 2024 8:48   
AM EDT  
                                        [facility]  
   
                                                    2024 Telephone encount  
er   
Note                                    Formatting of this note might   
be different from the   
original.  
Called pt to schedule f/u appt   
from recent IP stay. No   
Answer. No Machine. EDWINW  
Electronically signed by   
Kalpana Gu MA at   
2024 10:18 AM EDT  
                                        [facility]  
   
                                                    2024 Telephone encount  
er   
Note                                    Formatting of this note might   
be different from the   
original.  
Pt currently at [facility], [phone], Called to   
scheduled f/u appt, No Answer.   
LMOM. [facility]  
Electronically signed by   
Kalpana Gu MA at   
2024 1:10 PM EDT  
                                        [facility]  
   
                                        2024 Note     Dr. ANGELIC Mendez calls a  
nd speaks   
with Dr. Rand regarding   
plan of care and possible   
admission.  
Electronically Signed by:   
Ya Parson on 2024   
11:29 [facility]  
   
                                        2024 Note     Dr. Rand speaks  
 with Dr. Brower, cardiologist   
regarding plan of care.  
Electronically Signed by:   
Ya Parson on 2024   
10:55 [facility]  
   
                                        Evaluation + Plan note   
  
  
Future Appointments  
  
  
Appointment Date:10/03/2024   
02:30:00 PM  
Scheduled Provider:Reny Lo  
Location:[facility]  
Appointment Type:URO Office   
Visit  
                                        [facility]   
[facility]  
Work Phone: [phone]  
   
                                        Hospital course Narrative   
  
  
No data available for this   
section                                 [facility]   
[facility]  
Work Phone: [phone]  
   
                                                    Hospital Discharge   
instructions                              
  
  
No data available for this   
section                                 [facility]   
[facility]  
Work Phone: [phone]  
   
                                        Instructions        Not on filedocumente  
d in this   
encounter                               [facility]  
   
                                        Progress note         
  
  
No data available for this   
section                                 [facility]   
[facility]  
Work Phone: [phone]  
  
  
  
Summary Purpose  
  
  
                                                      
  
  
  
Family History  
No Family History Records FoundNo Family History Records FoundNo Family History 
Records FoundNo Family History Records Found  
  
No data available for this section  
  
No Family History Records Found  
  
Advance Directives  
No Advanced Directives Records FoundNo Advanced Directives Records FoundNo 
Advanced Directives Records FoundNo Advanced Directives Records FoundNo Advanced
 Directives Records Found  
  
Additional Source Comments  
  
  
  
                                                    PREGNANCY (unrecognized sect  
ion and content)  
   
                                                    No Pregnancy Status Records   
FoundNo Pregnancy Status Records FoundNo Pregnancy   
Status   
Records FoundNo Pregnancy Status Records FoundNo Pregnancy Status Records Found  
  
  
  
  
                                                    INFORMATION SOURCE (unrecogn  
ized section and content)  
   
                                          
  
  
  
                                        DATE CREATED        AUTHOR  
   
                                2018                      [facility]  
  
  
  
                                DATE CREATED    AUTHOR          AUTHOR'S ORGANIZ  
ATION  
   
                                2024                      [facility]  
[facility]  
  
  
  
                                DATE CREATED    AUTHOR          AUTHOR'S ORGANIZ  
ATION  
   
                                2024                      [facility]  
l  
  
  
  
                                DATE CREATED    AUTHOR          AUTHOR'S ORGANIZ  
ATION  
   
                                2024                      [facility]  
[facility]  
  
  
  
                                DATE CREATED    AUTHOR          AUTHOR'S ORGANIZ  
ATION  
   
                                2024                      [facility]  
  
  
  
  
  
                                                    Reason for Visit (unrecogniz  
ed section and content)  
   
                                          
  
  
  
                                Reason          Onset Date      Comments  
   
                                Hospital Follow-up 2024        
  
  
  
  
  
                                                    Patient Care team informatio  
n (unrecognized section and content)  
   
                                                      
  
  
Personnel  
  
  
Name: BRYON ARZOLA MD  
Address:  
Address: [address] [place] [address]  
  
  
FOR RECORDS PERTAINING TO PATIENTS WHO ARE OR HAVE BEEN ENROLLED IN A CHEMICAL 
DEPENDENCY/SUBSTANCEABUSE PROGRAM, SOME INFORMATION MAY BE OMITTED. This 
clinical summary was aggregated from multiple sources. Caution should be 
exercised in using it in the provision of clinical care. This summary normalizes
 information from multiple sources, and as a consequence, information in this 
document may materially change the coding, format and clinical context of 
patient data. In addition, data may be omitted in some cases. CLINICAL DECISIONS
 SHOULD BE BASED ON THE PRIMARY CLINICAL RECORDS. [facility]. provides
 no warranty or guarantee of the accuracy or completeness of information in this
 document. Patient Visit Information:   Visit Type: Follow up Visit      Cancer History:   Treatment Synopsis:       Breast         AJCC Edition: 8th (AJCC), Diagnosis Date: 27-Jul-2021, IA, pT1b pN0 cM0 G2     Treatment Synopsis:    64-year-old postmenopausal AA female with rightt-sided invasive lobular carcinoma, stage IA (T1b N0 M0). The patient's breast cancer was diagnosed on July 27, 2021,  and is estrogen receptor positive at >95%, progesterone receptor positive at >95%, and HER-2/xiao negative, grade 2 with MammaPrint Index = +0.120; translating to Low Risk Luminal A with a 10-year risk of recurrence of 10%. Details of her history are as  follows:       07/21/2021: Patient underwent a bilateral MRI for screening due to dense breast. This showed a mass measuring 0.8 x 0.8 x 0.7 cm mass. No axillary  or internal mammary LN appreciated   07/27/2021: Second look targeted US of the right breast and axilla-revealed 5 normal looking LNs.    07/27/2021: Patient underwent a US-guided biopsy of the right breast at 12:00, 4 cm from the nipple. Pathology was consistent with results as above   08/19/2021: Patient underwent a right partial mastectomy with SLNB. Pathology showed a 0.6cminvasive carcinoma, grade 2, with 0/2 SLNs involved. Margins were negative   2/23/2022: Completed Radiation   03/03/2022: Started Arimidex  1/17/2024 patient switched from anastrozole to letrozole and is tolerating better.    Hematology history:  Patient is a pleasant 64-year-old female presents today for elevated D-dimer following emergency room visit.  CT angio chest was performed and did not see any signs of pulmonary embolism.  D-dimer elevated 966, CRP elevated.  Conducted a flu and COVID swab which was negative.  She followed up with cardiology 1/12/2023 and was cleared and instructed to only follow-up as needed.  They performed a CT spine which revealed degenerative joint disease and foraminal stenosis at C5-7.  No abnormal intracranial  "abnormality.  CT of head performed with no acute concerns or hemorrhage.     Diagnosis of hormone positive breast cancer diagnosed July 21, 2021.  Treatment synopsis as above. Bone survey did not show lytic lesions. She had a recent EGD and colonoscopy 10/10/2023 due to abdominal- right upper quadrant.  Biopsies for celiac disease pending.  Internal hemorrhoids identified no other abnormal findings.  On 09/26/2023 CT abdomen pelvis shows hypodense liver lesion.  The stability since 2020 indicating benign etiology.  Ultrasound of gallbladder same date shows diffusely increased hepatic echogenicity may be seen with hepatic steatosis or hepatocellular disease, likely contributing to anemia. X-ray completed October 19, 2023 that showed no acute cardiopulmonary process.  She reports they were ruling out pneumonia.  Bilateral mammogram pending, previous 12/2022 recommended 1 year screening no malignancy identified.   Mammogram recommended 6 months due January 2024.     History of Present Illness:      ID Statement:       Calvin Monroe is a 64 year old female       Chief Complaint: \"my normal tiredness.\"   Interval History:    She reports still experiencing headaches though most of her other symptoms have resolved. Dizziness has improved.    She feels somewhat tired but not as bad after she was taken off of anastrozole by her rheumatologist.  She started letrozole and is tolerating so far.  Received iron infusions.  Reports normal appetite no some weight loss intentional. On Trulicity.  Reports night sweats occasionally that have decreased after starting Effexor. Followed by Dr. Burris clinic.    Previous lower back pain gone. Renal ultrasound 2/6/2024 normal.  An MRI of liver 2/6/2024 shows likely hemangioma, fatty liver and does not note any lymphadenopathy or suspicious masses.    She was ordered a PET scan by her rheumatologist though she reports advised by the breast cancer clinic that she should avoid " additional contrast agent testing.  At this time a PET scan does not seem necessary.     1/24/2024 Left breast biopsy on her left breast following dermatology. Shows inflammatory cells present.  Noted breast drainage and not noted since.    Patient denies cough, visual or hearing changes, oral sores or lesions of the skin, shortness of breath, problem swallowing, pain in the chest, current urinary issues, diarrhea, constipation, unusual vaginal discharge or dryness, any new lymphadenopathy or changes in her chest/breast area.  She reports her breast cancer was discovered during a routine mammogram screening.       Medical history:  -Edema in lower extremities  -Irritable bowel syndrome  -Multiple sclerosis  -Right side breast cancer hormone positive on anastrozole  -Perimenopausal  -Hematuria  -Partial meniscectomy  -Umbilical hernia repair  -Elevated ferritin after iron infusions.     Social history:  -She lives with  and has 3 healthy children and multiple grandchildren  -Homemaker  -She never smoked  -She does not drink other than wine occasionally marijuana Gummies for pain as needed recently  -No illicit drug use     Family history:  -Maternal grandmother stomach and liver cancer  -Paternal grandmother: Breast cancer  -Paternal uncle colon cancer  -No other known hematologic, genetic or cancer diagnosis in first-degree relatives     Review of Systems:   A review of systems has been completed and are negative for complaints except what is stated in the assessment, HPI, IH, and/or past medical history.           Allergies and Intolerances:       Allergies:             Allergies   Allergen Reactions    Adhesive Tape-Silicones Itching    Latex Hives    Meperidine Other       GI upset    Morphine Other       GI upset         Outpatient Medication Profile:             Current Outpatient Medications   Medication Instructions    anastrozole (Arimidex) 1 mg tablet 1 tablet, oral, Daily    furosemide (Lasix) 20 mg  "tablet 1 tablet, oral, Daily PRN    hyoscyamine (ANASPAZ, LEVSIN) 0.125 mg, oral, Every 6 hours PRN    venlafaxine (Effexor) 37.5 mg tablet 1 tablet, oral, Daily         Performance:   ECOG Performance Status: 0         Vitals and Measurements:       7/2/2024    10:57 AM 7/2/2024     3:19 PM 7/30/2024     2:02 PM 7/30/2024     3:11 PM 8/6/2024     3:07 PM 9/17/2024     3:42 PM 9/18/2024     2:05 PM   Vitals   Systolic   92    140   Diastolic   68    87   Heart Rate   95    76   Temp 36.4 °C (97.6 °F)  36.5 °C (97.7 °F)    36.2 °C (97.2 °F)   Resp   20    17   Height (in) 1.651 m (5' 5\") 1.651 m (5' 5\")  1.651 m (5' 5\") 1.651 m (5' 5\")     Weight (lb) 281  281.9 281 279.98 276 278   BMI 46.76 kg/m2 46.76 kg/m2 46.91 kg/m2 46.76 kg/m2 46.59 kg/m2 45.93 kg/m2 46.26 kg/m2   BSA (m2) 2.41 m2 2.41 m2 2.42 m2 2.41 m2 2.41 m2 2.39 m2 2.4 m2   Visit Report Report Report Report    Report Report    Report   Report      Physical Exam:      Constitutional: Patient is awake/alert/oriented  x3, no distress, Nourished, hydrated, alert and cooperative, she is overweight   Eyes: PERRL, EOMI, clear sclera   ENMT: mucous membranes moist, no oral lesions    Head/Neck: Neck supple, no apparent injury, thyroid  without mass or tenderness, No JVD, trachea midline, no bruits   Respiratory/Thorax: Patent airways, CTAB, chest symmetry, normal inspiratory and expiratory effort    Cardiovascular: Regular, rate and rhythm, normal S1 and S2, no carotid bruit   Gastrointestinal: Non-distended, soft, no masses or organomegaly appreciated d/t body habitus    Genitourinary: deferred   Musculoskeletal: No pain with lumbar palpation,, knee tenderness on right, normal strength for baseline    Extremities: Mild nonpitting ankle edema bilateral, normal strength, good circulation pulses   Neurological: alert and oriented x3, intact senses,  motor, response and reflexes, normal strength, cranial nerves normal   Breast:   Lymphatic: No significant " lymphadenopathy   Psychological: Appropriate mood and behavior   Skin: dry, no lesions, no rashes         Lab Results:    Lab Results   Component Value Date    WBC 7.5 09/09/2024    NEUTROABS 3.19 09/09/2024    IGABSOL 0.02 09/09/2024    LYMPHSABS 3.52 09/09/2024    MONOSABS 0.49 09/09/2024    EOSABS 0.23 09/09/2024    BASOSABS 0.05 09/09/2024    RBC 4.78 09/09/2024    MCV 82 09/09/2024    MCHC 31.3 (L) 09/09/2024    HGB 12.3 09/09/2024    HCT 39.3 09/09/2024     09/09/2024     Lab Results   Component Value Date    RETICCTPCT 1.7 02/01/2024      Lab Results   Component Value Date    CREATININE 0.69 05/08/2024    BUN 14 05/08/2024    EGFR >90 05/08/2024     05/08/2024    K 4.4 05/08/2024     05/08/2024    CO2 27 05/08/2024      Lab Results   Component Value Date    ALT 12 05/08/2024    AST 14 05/08/2024    ALKPHOS 77 05/08/2024    BILITOT 0.4 05/08/2024      Lab Results   Component Value Date    TSH 0.48 08/01/2022     Lab Results   Component Value Date    TSH 0.48 08/01/2022     Lab Results   Component Value Date    IRON 34 (L) 09/09/2024    TIBC 286 09/09/2024    FERRITIN 433 (H) 09/09/2024      Lab Results   Component Value Date    OAKHGIOB10 1,023 (H) 05/08/2024     Lab Results   Component Value Date    WARD Negative 10/26/2023    RF <10 02/01/2024    SEDRATE 75 (H) 02/01/2024      Lab Results   Component Value Date    CRP 4.75 (H) 04/10/2024        Lab Results   Component Value Date     09/09/2024     Lab Results   Component Value Date    HAPTOGLOBIN 255 (H) 02/01/2024     Lab Results   Component Value Date    SPEP Normal. 10/26/2023     Lab Results   Component Value Date    IGG 1,450 10/26/2023     10/26/2023     (H) 10/26/2023       Assessment and Plan:     #1.  Elevated D-dimer none DVT  Ms. Avery Huitron is a pleasant 63-year-old female that presents for follow-up from being in the emergency room with an elevated D-dimer.  She had previously seen for iron deficiency and  was infused x 2 Feraheme in November 2023. Tolerated well and with no side effects.   5/16/2024: Last D-dimer in January 2024 was 797, none VTE quantitative.  We will reassess this at next visit. Coags normal.   9/18/2024 remains slightly elevated may be related to inflammatory condition. Multiple musculoskeletal conditions inflamed. Received steroid injection in right knee recently.   10/30/2023 Bone survey shows no metastatic osseous lesion is identified. Multiple degenerative changes and osteoarthritis in both knees. Disc height loss.     #2.  Iron deficiency anemia versus chronic disease anemia with elevated ferritin   At initial visit hemoglobin 10.8, ferritin 108, iron TSAT 10%, ESR and CRP elevated.  MCV 81, previously microcytic.  GFR above 90.  Retic percent calculated 1.4.  Infusion received 10/31 and 11/07.  Discussed the potential causes of iron deficiency anemia.  Her inflammatory markers are elevated.  Full anemia work-up is negative for concerning markers.  Hemoglobin today is 11.6 remained stable.  Her SPEP is normal, kappa light chains elevated likely inflammatory, immunoglobulins IgA slightly elevated at 409, WARD negative.   Due to patient's complaint of bone pain in lower back, skeletal survey ordered and negative for lytic or concerning myeloma lesions. Notable degenerative changes. Referral to Rheumatology for elevated ESR, CRP and musculoskeletal pain.  Rheumatology ordered a PET/CT.  She has not had the procedure done due to being advised by breast cancer clinic that she has had a lot of imaging and should hold off on contrast.  I agree with this.  PET/CT is not warranted at this time.  We will continue to monitor immunoglobulins and inflammatory markers along with anemia labs.  5/16/2024: She had previous iron deficiency anemia that is now corrected.  Has elevated ferritin.  We are monitoring her levels.  Reassess at next visit.  There are no other abnormalities on her labs.     9/18/2024  TIBC UIBC in normal range.  Slight TSAT and iron stores are declining with elevated ferritin 433.  May be acute phase reactant. Negative hemachromatosis. Flow Cytometry order per patient request. Do not take oral iron.     #3.  Infected biopsy on breast   Patient noted breast drainage and itchiness once recently. Advised with this occurs again to reach out to derm and see if culture is needed. 1/24/2024 Left breast biopsy on her left breast following dermatology. Shows inflammatory cells present and seborrhoic keratosis.  On 8/6/2024 Mammogram showed a few dilated ducts are visualized without evidence of intraductal masses or intraductal flow.     #4. Fatty liver  Referral to hepatology. Advised healthy diet and exercise. Weight loss, she met with nutritionist and is on Trulicity. Hep C negative 12/7/2023.    Follow-up:     RTC:  -6 months with labs  -Will call with Flow results       Medications:  -None prescribed    Imaging:  -mammogram 1/03/2025     Referrals:  -Hepatology for fatty liver        Patient Instructions summary:  Discussed plan of care.  Patient states understanding and agreement.  Answered all her questions.  She is in agreement for referral.  She will call with any questions or concerns.    Thank you for allowing me to care for you today.     Sincerely,  Aarti Mcdonald, APRN-CNP     This note has been written with voice recognition software if there is need for clarity please reach out.

## 2024-09-27 LAB
ELECTRONICALLY SIGNED BY: NORMAL
TCR CLONALITY RESULTS: NORMAL

## 2024-09-30 ENCOUNTER — OFFICE VISIT (OUTPATIENT)
Dept: PRIMARY CARE | Facility: CLINIC | Age: 64
End: 2024-09-30
Payer: COMMERCIAL

## 2024-09-30 VITALS
SYSTOLIC BLOOD PRESSURE: 122 MMHG | DIASTOLIC BLOOD PRESSURE: 80 MMHG | WEIGHT: 271 LBS | TEMPERATURE: 98.4 F | RESPIRATION RATE: 16 BRPM | OXYGEN SATURATION: 94 % | HEIGHT: 65 IN | BODY MASS INDEX: 45.15 KG/M2 | HEART RATE: 94 BPM

## 2024-09-30 DIAGNOSIS — Z00.00 HEALTHCARE MAINTENANCE: Primary | ICD-10-CM

## 2024-09-30 PROCEDURE — 3008F BODY MASS INDEX DOCD: CPT | Performed by: INTERNAL MEDICINE

## 2024-09-30 PROCEDURE — 1036F TOBACCO NON-USER: CPT | Performed by: INTERNAL MEDICINE

## 2024-09-30 PROCEDURE — 99214 OFFICE O/P EST MOD 30 MIN: CPT | Performed by: INTERNAL MEDICINE

## 2024-09-30 PROCEDURE — 99214 OFFICE O/P EST MOD 30 MIN: CPT | Mod: GC | Performed by: INTERNAL MEDICINE

## 2024-09-30 SDOH — ECONOMIC STABILITY: FOOD INSECURITY: WITHIN THE PAST 12 MONTHS, YOU WORRIED THAT YOUR FOOD WOULD RUN OUT BEFORE YOU GOT MONEY TO BUY MORE.: NEVER TRUE

## 2024-09-30 SDOH — ECONOMIC STABILITY: FOOD INSECURITY: WITHIN THE PAST 12 MONTHS, THE FOOD YOU BOUGHT JUST DIDN'T LAST AND YOU DIDN'T HAVE MONEY TO GET MORE.: NEVER TRUE

## 2024-09-30 ASSESSMENT — ENCOUNTER SYMPTOMS
DEPRESSION: 0
LOSS OF SENSATION IN FEET: 0
OCCASIONAL FEELINGS OF UNSTEADINESS: 0

## 2024-09-30 ASSESSMENT — PAIN SCALES - GENERAL: PAINLEVEL: 0-NO PAIN

## 2024-09-30 ASSESSMENT — LIFESTYLE VARIABLES: HOW OFTEN DO YOU HAVE SIX OR MORE DRINKS ON ONE OCCASION: LESS THAN MONTHLY

## 2024-09-30 NOTE — PROGRESS NOTES
"Subjective   Patient ID: Calvin Varela- \"Ashlyn" is a 64 y.o. female  w/ right-sided invasive lobular carcinoma, stage IA (T1b N0 M0) pmhx of who presents for Establish Care (npv).  HPI    Patient report felling well overall. She reported knee pain has improved with diclofenac and knee injections. Patient has been compliant with her medications. Patient did report occasional leg swelling. Patient otherwise denied chest pain, SOB, N/V, leg pain or any other symptoms.     Review of Systems   All other systems reviewed and are negative.    Social Hx:  Tobacco- Denied  Alcohol- Wine glass every few weeks  Recreational drug- Denied    Fam Hx:  Paternal Grandmother-GI Cancer  Paternal Aunt- Breast Cancer  Father-DM    Objective   Physical Exam  Vitals reviewed.   Constitutional:       Appearance: Normal appearance.   HENT:      Nose: Nose normal.   Eyes:      Extraocular Movements: Extraocular movements intact.   Cardiovascular:      Rate and Rhythm: Normal rate and regular rhythm.   Pulmonary:      Effort: Pulmonary effort is normal.      Breath sounds: Normal breath sounds.   Abdominal:      General: Abdomen is flat. Bowel sounds are normal.      Palpations: Abdomen is soft.   Musculoskeletal:         General: Normal range of motion.   Skin:     General: Skin is warm.   Neurological:      Mental Status: She is alert and oriented to person, place, and time.   Psychiatric:         Mood and Affect: Mood normal.       Assessment/Plan     Pre-DM- C/w Trulicity   Right invasive lobular carcinoma- C/w Letrozole and Oncology follow up  Knee pain (OA vs MCL Olga)- C/w Ortho follow up   Chronic microcytic-normocytic anemia- C/w follow up with Heme  Fatty Liver-Hepatology referral  Labs prior to next visit  Peripheral edema-Counseled on use of compression stockings    HM:  Colon-Repeat in 2033  Mammo- UTD  Pap Smear- 2021 was negative, repeat Pap w/ OBGYN       Ramon Tobias MD 09/30/24 1:48 PM   "

## 2024-09-30 NOTE — PROGRESS NOTES
I saw and evaluated the patient. I personally obtained the key and critical portions of the history and physical exam or was physically present for key and critical portions performed by the resident/fellow. I reviewed the resident/fellow's documentation and discussed the patient with the resident/fellow. I agree with the resident/fellow's medical decision making as documented in the note.    Joy Castle MD

## 2024-10-01 ENCOUNTER — TELEPHONE (OUTPATIENT)
Dept: HEMATOLOGY/ONCOLOGY | Facility: CLINIC | Age: 64
End: 2024-10-01
Payer: COMMERCIAL

## 2024-10-02 ENCOUNTER — TELEPHONE (OUTPATIENT)
Dept: HEMATOLOGY/ONCOLOGY | Facility: CLINIC | Age: 64
End: 2024-10-02
Payer: COMMERCIAL

## 2024-10-02 NOTE — TELEPHONE ENCOUNTER
Called pt to inform her that ELDON Mcdonald BENEDICTO is not in today, but will return tomorrow.  Pt states she's anxious. Email sent to ELDON Mcdonald to inform her pt notes are available to her in Pt Call and Pt Advice Request.

## 2024-10-03 ENCOUNTER — TELEPHONE (OUTPATIENT)
Dept: HEMATOLOGY/ONCOLOGY | Facility: CLINIC | Age: 64
End: 2024-10-03
Payer: COMMERCIAL

## 2024-10-03 DIAGNOSIS — R89.8 ABNORMAL GENETIC TEST: ICD-10-CM

## 2024-10-03 DIAGNOSIS — R89.9 ABNORMAL LABORATORY TEST: ICD-10-CM

## 2024-10-03 DIAGNOSIS — C50.111 CANCER OF CENTRAL PORTION OF RIGHT BREAST (MULTI): Primary | ICD-10-CM

## 2024-10-03 NOTE — TELEPHONE ENCOUNTER
See earlier note for complete information.  Pt was called by ELDON Mcdonald CNP and Bone Marrow Biopsy is scheduled.

## 2024-10-04 NOTE — TELEPHONE ENCOUNTER
Pt to see Dr. Burris after PET and BM Bx.  Note send to schedulers to inform them of upcoming new pt appt.

## 2024-10-07 DIAGNOSIS — R73.03 PREDIABETES: ICD-10-CM

## 2024-10-09 RX ORDER — DULAGLUTIDE 1.5 MG/.5ML
1.5 INJECTION, SOLUTION SUBCUTANEOUS
Qty: 3 EACH | Refills: 1 | Status: SHIPPED | OUTPATIENT
Start: 2024-10-13

## 2024-10-21 ENCOUNTER — APPOINTMENT (OUTPATIENT)
Dept: RADIOLOGY | Facility: CLINIC | Age: 64
End: 2024-10-21
Payer: COMMERCIAL

## 2024-10-21 ENCOUNTER — HOSPITAL ENCOUNTER (OUTPATIENT)
Dept: RADIOLOGY | Facility: CLINIC | Age: 64
Discharge: HOME | End: 2024-10-21
Payer: COMMERCIAL

## 2024-10-21 DIAGNOSIS — C50.111 CANCER OF CENTRAL PORTION OF RIGHT BREAST (MULTI): ICD-10-CM

## 2024-10-21 DIAGNOSIS — R89.9 ABNORMAL LABORATORY TEST: ICD-10-CM

## 2024-10-21 DIAGNOSIS — R89.8 ABNORMAL GENETIC TEST: ICD-10-CM

## 2024-10-21 PROCEDURE — A9552 F18 FDG: HCPCS

## 2024-10-21 PROCEDURE — 78816 PET IMAGE W/CT FULL BODY: CPT | Mod: PI

## 2024-10-21 PROCEDURE — 3430000001 HC RX 343 DIAGNOSTIC RADIOPHARMACEUTICALS

## 2024-10-21 RX ORDER — FLUDEOXYGLUCOSE F 18 200 MCI/ML
14 INJECTION, SOLUTION INTRAVENOUS
Status: COMPLETED | OUTPATIENT
Start: 2024-10-21 | End: 2024-10-21

## 2024-10-24 ENCOUNTER — HOSPITAL ENCOUNTER (OUTPATIENT)
Dept: RADIOLOGY | Facility: HOSPITAL | Age: 64
Discharge: HOME | End: 2024-10-24
Payer: COMMERCIAL

## 2024-10-24 VITALS
OXYGEN SATURATION: 96 % | DIASTOLIC BLOOD PRESSURE: 55 MMHG | HEART RATE: 68 BPM | RESPIRATION RATE: 20 BRPM | SYSTOLIC BLOOD PRESSURE: 119 MMHG

## 2024-10-24 DIAGNOSIS — R89.9 ABNORMAL LABORATORY TEST: ICD-10-CM

## 2024-10-24 DIAGNOSIS — C50.111 CANCER OF CENTRAL PORTION OF RIGHT BREAST (MULTI): ICD-10-CM

## 2024-10-24 DIAGNOSIS — R89.8 ABNORMAL GENETIC TEST: ICD-10-CM

## 2024-10-24 LAB
BASOPHILS # BLD AUTO: 0.04 X10*3/UL (ref 0–0.1)
BASOPHILS NFR BLD AUTO: 0.7 %
EOSINOPHIL # BLD AUTO: 0.21 X10*3/UL (ref 0–0.7)
EOSINOPHIL NFR BLD AUTO: 3.6 %
ERYTHROCYTE [DISTWIDTH] IN BLOOD BY AUTOMATED COUNT: 14.6 % (ref 11.5–14.5)
HCT VFR BLD AUTO: 39.2 % (ref 36–46)
HGB BLD-MCNC: 12.1 G/DL (ref 12–16)
IMM GRANULOCYTES # BLD AUTO: 0.01 X10*3/UL (ref 0–0.7)
IMM GRANULOCYTES NFR BLD AUTO: 0.2 % (ref 0–0.9)
LYMPHOCYTES # BLD AUTO: 2.32 X10*3/UL (ref 1.2–4.8)
LYMPHOCYTES NFR BLD AUTO: 39.5 %
MCH RBC QN AUTO: 26.1 PG (ref 26–34)
MCHC RBC AUTO-ENTMCNC: 30.9 G/DL (ref 32–36)
MCV RBC AUTO: 85 FL (ref 80–100)
MONOCYTES # BLD AUTO: 0.41 X10*3/UL (ref 0.1–1)
MONOCYTES NFR BLD AUTO: 7 %
NEUTROPHILS # BLD AUTO: 2.89 X10*3/UL (ref 1.2–7.7)
NEUTROPHILS NFR BLD AUTO: 49 %
NRBC BLD-RTO: 0 /100 WBCS (ref 0–0)
PLATELET # BLD AUTO: 283 X10*3/UL (ref 150–450)
RBC # BLD AUTO: 4.63 X10*6/UL (ref 4–5.2)
WBC # BLD AUTO: 5.9 X10*3/UL (ref 4.4–11.3)

## 2024-10-24 PROCEDURE — 36415 COLL VENOUS BLD VENIPUNCTURE: CPT

## 2024-10-24 PROCEDURE — 99152 MOD SED SAME PHYS/QHP 5/>YRS: CPT | Performed by: RADIOLOGY

## 2024-10-24 PROCEDURE — 99152 MOD SED SAME PHYS/QHP 5/>YRS: CPT

## 2024-10-24 PROCEDURE — 7100000009 HC PHASE TWO TIME - INITIAL BASE CHARGE

## 2024-10-24 PROCEDURE — C1830 POWER BONE MARROW BX NEEDLE: HCPCS

## 2024-10-24 PROCEDURE — 85097 BONE MARROW INTERPRETATION: CPT | Mod: GEALAB

## 2024-10-24 PROCEDURE — 2500000004 HC RX 250 GENERAL PHARMACY W/ HCPCS (ALT 636 FOR OP/ED): Performed by: RADIOLOGY

## 2024-10-24 PROCEDURE — 2720000007 HC OR 272 NO HCPCS

## 2024-10-24 PROCEDURE — 7100000010 HC PHASE TWO TIME - EACH INCREMENTAL 1 MINUTE

## 2024-10-24 PROCEDURE — 77012 CT SCAN FOR NEEDLE BIOPSY: CPT

## 2024-10-24 PROCEDURE — 85025 COMPLETE CBC W/AUTO DIFF WBC: CPT

## 2024-10-24 RX ORDER — MIDAZOLAM HYDROCHLORIDE 1 MG/ML
INJECTION INTRAMUSCULAR; INTRAVENOUS
Status: COMPLETED | OUTPATIENT
Start: 2024-10-24 | End: 2024-10-24

## 2024-10-24 RX ORDER — FENTANYL CITRATE 50 UG/ML
INJECTION, SOLUTION INTRAMUSCULAR; INTRAVENOUS
Status: COMPLETED | OUTPATIENT
Start: 2024-10-24 | End: 2024-10-24

## 2024-10-24 RX ORDER — FENTANYL CITRATE 50 UG/ML
INJECTION, SOLUTION INTRAMUSCULAR; INTRAVENOUS
Status: DISCONTINUED
Start: 2024-10-24 | End: 2024-10-25 | Stop reason: HOSPADM

## 2024-10-24 RX ORDER — MIDAZOLAM HYDROCHLORIDE 1 MG/ML
INJECTION, SOLUTION INTRAMUSCULAR; INTRAVENOUS
Status: DISCONTINUED
Start: 2024-10-24 | End: 2024-10-25 | Stop reason: HOSPADM

## 2024-10-24 ASSESSMENT — PAIN SCALES - GENERAL
PAINLEVEL_OUTOF10: 0 - NO PAIN

## 2024-10-24 NOTE — DISCHARGE INSTRUCTIONS
Okay to remove band-aid tomorrow  Okay to shower tomorrow  Okay to use ice as needed for pain  Okay to take tylenol as needed for pain  No driving for 24 hours  No drinking alcohol for 24 hours   No making any life changing decisions for 24 hours  Follow up with Aarti Mcdonald in 2-4 weeks for results  Notify MD with any s/s of infection or active bleeding

## 2024-10-28 LAB
CELL COUNT (BLOOD): 74.54 X10*3/UL
CELL POPULATIONS: NORMAL
DIAGNOSIS: NORMAL
FLOW DIFFERENTIAL: NORMAL
FLOW TEST ORDERED: NORMAL
LAB TEST METHOD: NORMAL
NUMBER OF CELLS COLLECTED: NORMAL PER TUBE
PATH REPORT.COMMENTS IMP SPEC: NORMAL
PATH REPORT.FINAL DX SPEC: NORMAL
PATH REPORT.GROSS SPEC: NORMAL
PATH REPORT.MICROSCOPIC SPEC OTHER STN: NORMAL
PATH REPORT.RELEVANT HX SPEC: NORMAL
PATH REPORT.TOTAL CANCER: NORMAL
PATH REPORT.TOTAL CANCER: NORMAL
SIGNATURE COMMENT: NORMAL
SPECIMEN VIABILITY: NORMAL

## 2024-10-30 LAB
ELECTRONICALLY SIGNED BY: NORMAL
MYELOID NGS RESULTS: NORMAL

## 2024-11-04 ENCOUNTER — OFFICE VISIT (OUTPATIENT)
Dept: HEMATOLOGY/ONCOLOGY | Facility: CLINIC | Age: 64
End: 2024-11-04
Payer: COMMERCIAL

## 2024-11-04 VITALS
BODY MASS INDEX: 45.33 KG/M2 | DIASTOLIC BLOOD PRESSURE: 86 MMHG | WEIGHT: 272.05 LBS | OXYGEN SATURATION: 95 % | RESPIRATION RATE: 16 BRPM | HEART RATE: 75 BPM | HEIGHT: 65 IN | SYSTOLIC BLOOD PRESSURE: 137 MMHG | TEMPERATURE: 96.8 F

## 2024-11-04 DIAGNOSIS — D50.9 IRON DEFICIENCY ANEMIA, UNSPECIFIED IRON DEFICIENCY ANEMIA TYPE: ICD-10-CM

## 2024-11-04 PROCEDURE — 99214 OFFICE O/P EST MOD 30 MIN: CPT | Performed by: INTERNAL MEDICINE

## 2024-11-04 PROCEDURE — 3008F BODY MASS INDEX DOCD: CPT | Performed by: INTERNAL MEDICINE

## 2024-11-04 SDOH — ECONOMIC STABILITY: FOOD INSECURITY: WITHIN THE PAST 12 MONTHS, YOU WORRIED THAT YOUR FOOD WOULD RUN OUT BEFORE YOU GOT THE MONEY TO BUY MORE.: NEVER TRUE

## 2024-11-04 SDOH — ECONOMIC STABILITY: FOOD INSECURITY: WITHIN THE PAST 12 MONTHS, THE FOOD YOU BOUGHT JUST DIDN'T LAST AND YOU DIDN'T HAVE MONEY TO GET MORE.: NEVER TRUE

## 2024-11-04 ASSESSMENT — PAIN SCALES - GENERAL: PAINLEVEL_OUTOF10: 7

## 2024-11-04 NOTE — PROGRESS NOTES
"Patient ID: Fouzia Monroe is a 64 y.o. female.  Referring Physician: No referring provider defined for this encounter.  Primary Care Provider: Antonella Lagunas MD  Visit Type:  Initial Visit     Subjective    HPI  Flow Cytometry: CD3+CD4+: 44 % ;     Polyclonal   Small subset of CD4+ Cd8 dim T cells (0.3%) are TRBC1+ (restricted) consistent with a clonally expanded T cell population likely of little clinical significance. CD3+CD8+: 30 % ;     Polyclonal     A-C. BONE MARROW ASPIRATE WITH CLOT, CORE BIOPSY, AND TOUCH PREP, LEFT ILIAC CREST:      -- HYPERCELLULAR BONE MARROW (70-80%) WITH NORMAL MATURING TRILINEAGE HEMATOPOIESIS, SEE NOTE.   --ADEQUATE IRON STORES.     NOTE: By flow cytometry, see separate report, a very small (0.3%) population of CD4+ CD8 dim T cells was detected that was clonal by TRBC1 staining (TRBC1+).  Small populations of T cells with this phenotype are relatively common in aged individuals and have little clinical significance even when present at high levels (PMID: [id number], [id number]).  The cells are present at very low level here and most likely represent an incidental finding.     No monoclonal protein detected.     PET SCAN:  IMPRESSION:  No FDG avid dulce or extranodal disease to suggest a lymphomatous process or recurrent breast cancer.        Signed by: Seymour Ewing 10/21/2024    Review of Systems   Constitutional: Negative.    HENT:  Negative.     Respiratory: Negative.     Cardiovascular: Negative.         Objective   BSA: 2.38 meters squared  /86 (BP Location: Left arm, Patient Position: Sitting, BP Cuff Size: Large adult)   Pulse 75   Temp 36 °C (96.8 °F) (Temporal)   Resp 16   Ht (S) 1.662 m (5' 5.43\")   Wt 123 kg (272 lb 0.8 oz)   SpO2 95%   BMI 44.67 kg/m²      has a past medical history of Anxiety disorder, unspecified, Arthritis, Bilious vomiting (04/26/2016), Breast CA (Multi), Headache, unspecified (02/03/2017), Irritable bowel syndrome with diarrhea " "(04/07/2016), Mammographic calcification found on diagnostic imaging of breast (06/01/2015), ARIAN (obstructive sleep apnea), Overactive bladder, Personal history of diseases of the blood and blood-forming organs and certain disorders involving the immune mechanism, Personal history of diseases of the skin and subcutaneous tissue (07/27/2018), Personal history of other diseases of the female genital tract (08/01/2017), Personal history of other mental and behavioral disorders, and Personal history of other specified conditions.   has a past surgical history that includes Other surgical history (10/17/2014); Hernia repair (10/17/2014); Other surgical history (08/03/2021); MR angio head wo IV contrast (06/12/2021); MR angio neck wo IV contrast (06/12/2021); Breast lumpectomy (Right, 08/2021); and Fracture surgery.  Family History   Problem Relation Name Age of Onset    Other (murder) Mother      Alzheimer's disease Father      Diabetes Father      Lung cancer Father      Asthma Brother      Stomach cancer Maternal Grandmother      Breast cancer Paternal Grandmother      Other (cardiac disorder) Other      Stroke Other      Ovarian cancer Other      Breast cancer Other       Oncology History    No history exists.       Calvin Varela-Field \"Fouzia\"  reports that she has never smoked. She has never used smokeless tobacco.  She  reports current alcohol use.  She  reports no history of drug use.    Physical Exam  Constitutional:       Appearance: Normal appearance.   HENT:      Head: Normocephalic and atraumatic.   Eyes:      Extraocular Movements: Extraocular movements intact.      Pupils: Pupils are equal, round, and reactive to light.   Neurological:      Mental Status: She is alert.         WBC   Date/Time Value Ref Range Status   10/24/2024 01:44 PM 5.9 4.4 - 11.3 x10*3/uL Final   09/18/2024 02:28 PM 6.5 4.4 - 11.3 x10*3/uL Final   09/09/2024 05:39 PM 7.5 4.4 - 11.3 x10*3/uL Final     nRBC   Date Value Ref Range " Status   10/24/2024 0.0 0.0 - 0.0 /100 WBCs Final   09/09/2024 0.0 0.0 - 0.0 /100 WBCs Final   05/08/2024 0.0 0.0 - 0.0 /100 WBCs Final     RBC   Date Value Ref Range Status   10/24/2024 4.63 4.00 - 5.20 x10*6/uL Final   09/18/2024 4.61 4.00 - 5.20 x10*6/uL Final   09/09/2024 4.78 4.00 - 5.20 x10*6/uL Final     Hemoglobin   Date Value Ref Range Status   10/24/2024 12.1 12.0 - 16.0 g/dL Final   09/18/2024 11.9 (L) 12.0 - 16.0 g/dL Final   09/09/2024 12.3 12.0 - 16.0 g/dL Final     Hematocrit   Date Value Ref Range Status   10/24/2024 39.2 36.0 - 46.0 % Final   09/18/2024 38.8 36.0 - 46.0 % Final   09/09/2024 39.3 36.0 - 46.0 % Final     MCV   Date/Time Value Ref Range Status   10/24/2024 01:44 PM 85 80 - 100 fL Final   09/18/2024 02:28 PM 84 80 - 100 fL Final   09/09/2024 05:39 PM 82 80 - 100 fL Final     MCH   Date/Time Value Ref Range Status   10/24/2024 01:44 PM 26.1 26.0 - 34.0 pg Final   09/18/2024 02:28 PM 25.8 (L) 26.0 - 34.0 pg Final   09/09/2024 05:39 PM 25.7 (L) 26.0 - 34.0 pg Final     MCHC   Date/Time Value Ref Range Status   10/24/2024 01:44 PM 30.9 (L) 32.0 - 36.0 g/dL Final   09/18/2024 02:28 PM 30.7 (L) 32.0 - 36.0 g/dL Final   09/09/2024 05:39 PM 31.3 (L) 32.0 - 36.0 g/dL Final     RDW   Date/Time Value Ref Range Status   10/24/2024 01:44 PM 14.6 (H) 11.5 - 14.5 % Final   09/18/2024 02:28 PM 14.2 11.5 - 14.5 % Final   09/09/2024 05:39 PM 14.6 (H) 11.5 - 14.5 % Final     Platelets   Date/Time Value Ref Range Status   10/24/2024 01:44  150 - 450 x10*3/uL Final   09/18/2024 02:28  150 - 450 x10*3/uL Final   09/09/2024 05:39  150 - 450 x10*3/uL Final     MPV   Date/Time Value Ref Range Status   02/01/2024 06:01 PM 11.1 7.5 - 11.5 fL Final   10/26/2023 03:23 PM 9.9 7.5 - 11.5 fL Final     Neutrophils %   Date/Time Value Ref Range Status   10/24/2024 01:44 PM 49.0 40.0 - 80.0 % Final   09/18/2024 02:28 PM 40.0 40.0 - 80.0 % Final   09/09/2024 05:39 PM 42.5 40.0 - 80.0 % Final     Immature  Granulocytes %, Automated   Date/Time Value Ref Range Status   10/24/2024 01:44 PM 0.2 0.0 - 0.9 % Final     Comment:     Immature Granulocyte Count (IG) includes promyelocytes, myelocytes and metamyelocytes but does not include bands. Percent differential counts (%) should be interpreted in the context of the absolute cell counts (cells/UL).   09/18/2024 02:28 PM 0.2 0.0 - 0.9 % Final     Comment:     Immature Granulocyte Count (IG) includes promyelocytes, myelocytes and metamyelocytes but does not include bands. Percent differential counts (%) should be interpreted in the context of the absolute cell counts (cells/UL).   09/09/2024 05:39 PM 0.3 0.0 - 0.9 % Final     Comment:     Immature Granulocyte Count (IG) includes promyelocytes, myelocytes and metamyelocytes but does not include bands. Percent differential counts (%) should be interpreted in the context of the absolute cell counts (cells/UL).     Lymphocytes %   Date/Time Value Ref Range Status   10/24/2024 01:44 PM 39.5 13.0 - 44.0 % Final   09/18/2024 02:28 PM 48.8 13.0 - 44.0 % Final   09/09/2024 05:39 PM 46.9 13.0 - 44.0 % Final     Monocytes %   Date/Time Value Ref Range Status   10/24/2024 01:44 PM 7.0 2.0 - 10.0 % Final   09/18/2024 02:28 PM 7.1 2.0 - 10.0 % Final   09/09/2024 05:39 PM 6.5 2.0 - 10.0 % Final     Eosinophils %   Date/Time Value Ref Range Status   10/24/2024 01:44 PM 3.6 0.0 - 6.0 % Final   09/18/2024 02:28 PM 3.7 0.0 - 6.0 % Final   09/09/2024 05:39 PM 3.1 0.0 - 6.0 % Final     Basophils %   Date/Time Value Ref Range Status   10/24/2024 01:44 PM 0.7 0.0 - 2.0 % Final   09/18/2024 02:28 PM 0.2 0.0 - 2.0 % Final   09/09/2024 05:39 PM 0.7 0.0 - 2.0 % Final     Neutrophils Absolute   Date/Time Value Ref Range Status   10/24/2024 01:44 PM 2.89 1.20 - 7.70 x10*3/uL Final     Comment:     Percent differential counts (%) should be interpreted in the context of the absolute cell counts (cells/uL).   09/18/2024 02:28 PM 2.61 1.20 - 7.70 x10*3/uL  "Final     Comment:     Percent differential counts (%) should be interpreted in the context of the absolute cell counts (cells/uL).   09/09/2024 05:39 PM 3.19 1.20 - 7.70 x10*3/uL Final     Comment:     Percent differential counts (%) should be interpreted in the context of the absolute cell counts (cells/uL).     Immature Granulocytes Absolute, Automated   Date/Time Value Ref Range Status   10/24/2024 01:44 PM 0.01 0.00 - 0.70 x10*3/uL Final   09/18/2024 02:28 PM 0.01 0.00 - 0.70 x10*3/uL Final   09/09/2024 05:39 PM 0.02 0.00 - 0.70 x10*3/uL Final     Lymphocytes Absolute   Date/Time Value Ref Range Status   10/24/2024 01:44 PM 2.32 1.20 - 4.80 x10*3/uL Final   09/18/2024 02:28 PM 3.18 1.20 - 4.80 x10*3/uL Final   09/09/2024 05:39 PM 3.52 1.20 - 4.80 x10*3/uL Final     Monocytes Absolute   Date/Time Value Ref Range Status   10/24/2024 01:44 PM 0.41 0.10 - 1.00 x10*3/uL Final   09/18/2024 02:28 PM 0.46 0.10 - 1.00 x10*3/uL Final   09/09/2024 05:39 PM 0.49 0.10 - 1.00 x10*3/uL Final     Eosinophils Absolute   Date/Time Value Ref Range Status   10/24/2024 01:44 PM 0.21 0.00 - 0.70 x10*3/uL Final   09/18/2024 02:28 PM 0.24 0.00 - 0.70 x10*3/uL Final   09/09/2024 05:39 PM 0.23 0.00 - 0.70 x10*3/uL Final     Basophils Absolute   Date/Time Value Ref Range Status   10/24/2024 01:44 PM 0.04 0.00 - 0.10 x10*3/uL Final   09/18/2024 02:28 PM 0.01 0.00 - 0.10 x10*3/uL Final   09/09/2024 05:39 PM 0.05 0.00 - 0.10 x10*3/uL Final       No components found for: \"PT\"  aPTT   Date/Time Value Ref Range Status   01/24/2024 02:12 PM 33 27 - 38 seconds Final   01/11/2024 08:56 PM 32 27 - 38 seconds Final       Assessment/Plan      No actionable BM abnormality: Refer back to primary / referring provider              Igor Burris MD                         "

## 2024-11-04 NOTE — PATIENT INSTRUCTIONS
Today you met with your hematologist/oncologist.  Recent labs were discussed and questions answered.  Scheduling orders were placed.  While we appreciate that you verbalized understanding, if any questions arise after leaving, please do not hesitate to call the office to discuss.  [phone] Riya Burris will see you in one  year. Please arrive 15 minutes early for labs to be drawn.

## 2024-11-06 ASSESSMENT — ENCOUNTER SYMPTOMS
CONSTITUTIONAL NEGATIVE: 1
RESPIRATORY NEGATIVE: 1
CARDIOVASCULAR NEGATIVE: 1

## 2024-11-07 ENCOUNTER — OFFICE VISIT (OUTPATIENT)
Dept: GASTROENTEROLOGY | Facility: CLINIC | Age: 64
End: 2024-11-07
Payer: COMMERCIAL

## 2024-11-07 VITALS
HEIGHT: 65 IN | SYSTOLIC BLOOD PRESSURE: 123 MMHG | BODY MASS INDEX: 45.98 KG/M2 | DIASTOLIC BLOOD PRESSURE: 85 MMHG | TEMPERATURE: 97.3 F | HEART RATE: 89 BPM | WEIGHT: 276 LBS

## 2024-11-07 DIAGNOSIS — K76.0 FATTY LIVER: ICD-10-CM

## 2024-11-07 DIAGNOSIS — R79.89 ELEVATED FERRITIN: ICD-10-CM

## 2024-11-07 PROCEDURE — 99214 OFFICE O/P EST MOD 30 MIN: CPT | Performed by: STUDENT IN AN ORGANIZED HEALTH CARE EDUCATION/TRAINING PROGRAM

## 2024-11-07 PROCEDURE — 3008F BODY MASS INDEX DOCD: CPT | Performed by: STUDENT IN AN ORGANIZED HEALTH CARE EDUCATION/TRAINING PROGRAM

## 2024-11-07 PROCEDURE — 1036F TOBACCO NON-USER: CPT | Performed by: STUDENT IN AN ORGANIZED HEALTH CARE EDUCATION/TRAINING PROGRAM

## 2024-11-07 ASSESSMENT — PAIN SCALES - GENERAL: PAINLEVEL_OUTOF10: 7

## 2024-11-07 NOTE — PATIENT INSTRUCTIONS
If you have any questions or need assistance, please don't hesitate to contact us.     Our offices are located at [facility].  Please use the numbers below when calling.   Dr. Merida:         Office [phone] Fax: [phone]  Hepatology Nurse Coordinator:         Franny DUENAS [phone]     Main Scheduling Number: [phone]   (See below for department name when scheduling)

## 2024-11-07 NOTE — PROGRESS NOTES
"[facility] HEPATOLOGY CLINIC NOTE     History of Present Illness:   Calvin Monroe \"Ashlyn" is a 64 y.o. female who presents to clinic for evaluation of fatty liver disease and elevated ferritin.     She has a past medical history of right-sided invasive lobular carcinoma of the breast, stage Ia.  She underwent a right partial mastectomy and completed radiation therapy.  She was previously treated with anastrozole and later switched to letrozole. She was never treated with tamoxifen. She also has a medical history of prediabetes, morbid obesity, aortic root dilatation, dyspepsia, irritable bowel syndrome with diarrhea, rectocele, bladder dysfunction, hypoestrogenism.    The patient reports he seen by gastroenterology in December 2023.  She was seen for imaging revealing a hypodense lesion in the liver as well as fatty liver disease.  She had an ultrasound  on 9/2020 demonstrated no hepatic steatosis.  She later had a CT scan on 9/2023 that showed a 1.9 x 1.3cm hypodense liver lesion that has been stable since 2020, likely benign. She had a MR of the Liver in 2/2024 which showed a 10mm x 9mm hemangioma and no other liver lesions.     She has a family history of stomach and pancreatic cancer in her grandmother, also family history of colon cancer.  The patient rarely consumes alcohol, he does not use tobacco, no street drugs.  She has no family history of liver disease    Today the patient feels well without any nausea, vomiting, abdominal pain, fevers, chills.  The patient has also had an elevated ferritin in the past.  She was previously receiving iron infusions for which she was told was iron deficiency anemia.  She did have a low iron however this could be anemia of chronic disease.  She has since stopped the iron infusions.    Review of Systems  Review of systems otherwise negative.     Social History   reports that she has never smoked. She has never used smokeless tobacco. She reports " current alcohol use. She reports that she does not use drugs.     Family History  family history includes Alzheimer's disease in her father; Asthma in her brother; Breast cancer in her paternal grandmother and another family member; Diabetes in her father; Lung cancer in her father; Ovarian cancer in an other family member; Stomach cancer in her maternal grandmother; Stroke in an other family member; cardiac disorder in an other family member; murder in her mother.     Allergies  Allergies   Allergen Reactions    Adhesive Tape-Silicones Itching    Latex Hives    Meperidine Other     GI upset     Morphine Other     GI upset     Medications  Current Outpatient Medications   Medication Instructions    diclofenac sodium (VOLTAREN) 4 g, Topical, 4 times daily    letrozole (FEMARA) 2.5 mg, oral, Daily, Take with or without food.    Trulicity 1.5 mg, subcutaneous, Once Weekly    venlafaxine XR (EFFEXOR XR) 37.5 mg, oral, Daily, Do not crush or chew.        Visit Vitals  /85   Pulse 89   Temp 36.3 °C (97.3 °F)      Physical Exam  Middle-age woman in no acute distress, nontoxic-appearing, well-nourished and developed  Extremities are intact, there is no conjunctival icterus  Heart and lung exam are unremarkable  Abdomen is soft nontender palpation, there is normal active bowel sounds  No lower extremity edema    Labs    Lab Results   Component Value Date    WBC 5.9 10/24/2024    HGB 12.1 10/24/2024    HCT 39.2 10/24/2024    MCV 85 10/24/2024     10/24/2024     Lab Results   Component Value Date    GLUCOSE 90 05/08/2024    CALCIUM 9.8 05/08/2024     05/08/2024    K 4.4 05/08/2024    CO2 27 05/08/2024     05/08/2024    BUN 14 05/08/2024    CREATININE 0.69 05/08/2024     Lab Results   Component Value Date    ALT 12 05/08/2024    AST 14 05/08/2024    ALKPHOS 77 05/08/2024    BILITOT 0.4 05/08/2024     Lab Results   Component Value Date    INR 1.0 01/24/2024    INR 1.0 01/11/2024    INR 1.0 06/09/2022     PROTIME 11.6 01/24/2024    PROTIME 11.6 01/11/2024    PROTIME 11.9 06/09/2022     ASSESSMENT AND PLAN:    #Metabolic dysfunction associated steatotic liver disease  The patient had imaging that demonstrated hepatic steatosis.  I reviewed the patient's liver test and they have been entirely normal.  Her imaging has not demonstrated any nodularity or scarring of the liver.  Overall, the patient is at low risk for complications at this point.  She is already on a GLP-1 agonist for history of obesity and has already lost 25 pounds, her weight is currently 276 and was previously up to 300 pounds.  I congratulated her on her weight loss and recommended continue weight loss through healthy eating, eliminating desserts and sweet foods, and low to moderate intensity exercise.  She can follow-up with her PCP with repeat liver tests once every year.  If she does have elevated liver test persistently from fatty liver disease, she can have a fibrosis assessment or follow-up with me in clinic.  For now she can see us as needed.    #Hemangioma  The patient had an ultrasound and a CT scan done in September of last year that demonstrated a hypodense lesion.  An MRI was done in February 2024 that demonstrated that it was a hepatic hemangioma measuring about 1 cm.  I do see that a liver biopsy was ordered today however I would recommend against a liver biopsy of this lesion.    #Obesity  #Prediabetes  I had a long thorough discussion with the patient about weight loss and dietary changes in clinic today.  She can continue to follow with her primary care doctor for further weight loss.  She attributes the excess eating and snacking due to stresses related to her recent medical diagnosis of breast cancer, the PET/CT and bone marrow biopsy.  I did review these with her again and reassured her that it has been unremarkable.      Alfredo Merida MD  [facility], [facility]

## 2024-11-14 ENCOUNTER — APPOINTMENT (OUTPATIENT)
Dept: PRIMARY CARE | Facility: CLINIC | Age: 64
End: 2024-11-14
Payer: COMMERCIAL

## 2024-12-19 ENCOUNTER — APPOINTMENT (OUTPATIENT)
Dept: PRIMARY CARE | Facility: CLINIC | Age: 64
End: 2024-12-19
Payer: COMMERCIAL

## 2025-01-03 ENCOUNTER — HOSPITAL ENCOUNTER (OUTPATIENT)
Dept: RADIOLOGY | Facility: CLINIC | Age: 65
Discharge: HOME | End: 2025-01-03
Payer: COMMERCIAL

## 2025-01-03 ENCOUNTER — OFFICE VISIT (OUTPATIENT)
Dept: HEMATOLOGY/ONCOLOGY | Facility: CLINIC | Age: 65
End: 2025-01-03
Payer: COMMERCIAL

## 2025-01-03 VITALS
SYSTOLIC BLOOD PRESSURE: 127 MMHG | BODY MASS INDEX: 45.86 KG/M2 | TEMPERATURE: 97.7 F | DIASTOLIC BLOOD PRESSURE: 84 MMHG | WEIGHT: 275.57 LBS | HEART RATE: 91 BPM

## 2025-01-03 DIAGNOSIS — Z09 ONCOLOGY FOLLOW-UP ENCOUNTER: ICD-10-CM

## 2025-01-03 DIAGNOSIS — Z12.4 SCREENING FOR CERVICAL CANCER: ICD-10-CM

## 2025-01-03 DIAGNOSIS — Z92.3 HISTORY OF EXTERNAL BEAM RADIATION THERAPY: ICD-10-CM

## 2025-01-03 DIAGNOSIS — Z85.3 PERSONAL HISTORY OF BREAST CANCER: ICD-10-CM

## 2025-01-03 DIAGNOSIS — Z12.31 ENCOUNTER FOR SCREENING MAMMOGRAM FOR MALIGNANT NEOPLASM OF BREAST: Primary | ICD-10-CM

## 2025-01-03 DIAGNOSIS — N64.89 BREAST ASYMMETRY: ICD-10-CM

## 2025-01-03 PROCEDURE — 1036F TOBACCO NON-USER: CPT | Performed by: NURSE PRACTITIONER

## 2025-01-03 PROCEDURE — 99215 OFFICE O/P EST HI 40 MIN: CPT | Performed by: NURSE PRACTITIONER

## 2025-01-03 PROCEDURE — 77063 BREAST TOMOSYNTHESIS BI: CPT

## 2025-01-03 RX ORDER — EXEMESTANE 25 MG/1
25 TABLET ORAL DAILY
Qty: 30 TABLET | Refills: 1 | Status: SHIPPED | OUTPATIENT
Start: 2025-01-03

## 2025-01-03 ASSESSMENT — PATIENT HEALTH QUESTIONNAIRE - PHQ9
2. FEELING DOWN, DEPRESSED OR HOPELESS: NOT AT ALL
1. LITTLE INTEREST OR PLEASURE IN DOING THINGS: NOT AT ALL
SUM OF ALL RESPONSES TO PHQ9 QUESTIONS 1 & 2: 0

## 2025-01-03 ASSESSMENT — PAIN SCALES - GENERAL: PAINLEVEL_OUTOF10: 4

## 2025-01-03 NOTE — PATIENT INSTRUCTIONS
Please call us at [phone] option 5 then option 2 with any questions or concerns.    1. Exercise 2.5 hours per week; bone strengthening, cardio-vascular, resistance training.  2. Please do self breast exams monthly.  3. Keep alcohol under 3 drinks per week.  4. Sun safety - limit sun exposure from 11a-2p when its at its hottest, apply 15-30 sun block and re-apply every 1-2 hours if perspiring or swimming.  5. Eat a plant based diet, add in oily fishes such as mackerel, tuna, and salmon.  6. Get in at least 1,000 mg of calcium per day through diet or supplement for bone strength. Examples of foods higher in calcium are milk, yogurt, fruited yogurt, oranges, fortified orange juice, almonds, almond milk, broccoli, spinach, bok jenny, mustard greens, puddings, custards, ice cream, fortified cereals, bars, and crackers.   7. Take a 2-3 week break from your medication then start your exemestane after. Start turmeric 500-1,000mg daily. Please call the office after a couple weeks to let us know how you are doing on the medication.  8. Please call the office if any new mass or rash in or around breast, or any uncontrolled symptoms that last over 2-3 weeks at [phone].  9. We will reach out to you for any follow up needed on your mammogram.  10. I have placed referral for gynecology, and breast plastic surgery with Dr. Glass.   11. Please bring your Rx for bras/prosthesis to [facility] in [place].  10. It was nice seeing you today, Fouzia. I will see you back this time next year with a Mammogram.   Thank you for choosing [facility] with your care.

## 2025-01-03 NOTE — PROGRESS NOTES
Oncology Follow-Up    Calvin Monroe  [id number]            Breast         AJCC Edition: 8th (AJCC), Diagnosis Date: 27-Jul-2021, IA, pT1b pN0 cM0 G2    Oncology History    No history exists.     Treatment Synopsis:    61-year-old postmenopausal AA female with rightt-sided invasive lobular carcinoma, stage IA (T1b N0 M0). The patient's breast cancer was diagnosed on July 27, 2021,  and is estrogen receptor positive at >95%, progesterone receptor positive at >95%, and HER-2/xiao negative, grade 2 with MammaPrint Index = +0.120; translating to Low Risk Luminal A with a 10-year risk of recurrence of 10%. Details of her history are as  follows:       07/21/2021: Patient underwent a bilateral MRI for screening due to dense breast. This showed a mass measuring 0.8 x 0.8 x 0.7 cm mass. No axillary  or internal mammary LN appreciated   07/27/2021: Second look targeted US of the right breast and axilla-revealed 5 normal looking LNs.    07/27/2021: Patient underwent a US-guided biopsy of the right breast at 12:00, 4 cm from the nipple. Pathology was consistent with results as above   08/19/2021: Patient underwent a right partial mastectomy with SLNB. Pathology showed a 0.6cminvasive carcinoma, grade 2, with 0/2 SLNs involved. Margins were negative   2/23/2022: Completed Radiation   03/03/2022: Started Arimidex      Subjective    Fouzia presents for her Oncology Follow Up Visit and mammogram. She has not been taking anastrozole regularly as she feels it makes her have less energy and aggravates her right knee pain. She took a pill today though it had been several weeks since she took the last one. Fouzia has severe asymmetry between her breasts and asks for a prosthesis/bra Rx to take to [facility] as she misplaced the one she was given. Fouzia reports swelling in both feet. She sees dermatology yearly. She is not up to date with her vaccines. She has not had a GYN exam in several years. She is now seeing  Dr. Castle in primary care. Fouzia denies any unusual headaches, balance issues, depression, cough, shortness of breath, problems swallowing, changes in chest/breast area, abdominal pain, bone or muscle pain (other then mentioned above), vaginal bleeding, rectal bleeding, blood in the urine, vaginal dryness, swelling of her arms, or new or unusual skin moles or lesions.         Objective      Vitals:    01/03/25 1450   BP: 127/84   Pulse: 91   Temp: 36.5 °C (97.7 °F)        Constitutional: Well developed, alert/oriented x3, no distress, cooperative   Eyes: clear sclera   ENMT: mucous membranes moist, no apparent lesions   Head/Neck: Neck supple, no bruits   Respiratory/Thorax: Patent airways, normal breath sounds with good chest expansion   Cardiovascular: Regular rate and rhythm, no murmurs, 2+ equal pulses of the extremities,   Gastrointestinal: Nondistended, soft, non-tender, no masses palpable, no organomegaly   Musculoskeletal: ROM intact, no joint swelling, normal strength   Extremities: normal extremities, no edema, cyanosis, contusions or wounds   Neurological: alert and oriented x3,  normal strength   Breast:    Lymphatic: No significant lymphadenopathy   Psychological: Appropriate mood and behavior   Skin: Warm and dry, no lesions, no rashes      Physical Exam  Chest:          Comments: Right breast positive for breast conserving surgery with well healed circumareolar and right axillary incisions; no masses, nodules, skin changes, discharge. Left breast without masses, nodules, skin changes, discharge. There is significant asymmetry with R<L breast.         Lab Results   Component Value Date    WBC 5.9 10/24/2024    HGB 12.1 10/24/2024    HCT 39.2 10/24/2024    MCV 85 10/24/2024     10/24/2024       Chemistry    Lab Results   Component Value Date/Time     05/08/2024 1736    K 4.4 05/08/2024 1736     05/08/2024 1736    CO2 27 05/08/2024 1736    BUN 14 05/08/2024 1736    CREATININE 0.69  05/08/2024 1736    Lab Results   Component Value Date/Time    CALCIUM 9.8 05/08/2024 1736    ALKPHOS 77 05/08/2024 1736    AST 14 05/08/2024 1736    ALT 12 05/08/2024 1736    BILITOT 0.4 05/08/2024 1736         Imaging:  Mammogram results pending at time of this dictation.      Assessment/Plan    Fouzia is a very nice 63 yo woman with a hx of T1bN0 G-2 right ILC diagnosed July 2021. She is s/p partial mastectomy, SLNB, XRT, and initiated anastrozole/letrozole March 2022. She has been on-compliant with letrozole only taking it once every few weeks due to feeling weak and increased right knee pain. There is no evidence of recurrent disease on today's exam.     Plan:  Exam is negative.  Medication vacation for 2 weeks. Then start exemestane 25mg daily. Start turmeric 500-1,000mg daily.  Refer to Dr. Liza Glass in breast plastics for consideration of matching reduction.  Rx for prosthesis and bras and information on [facility].  Referral to gynecology [facility] or [facility].  Discuss vaccine status with Dr. Castle. Advised to update vaccines.  Encouraged monthly breast self exams, plant based diet, keep alcohol <3 drinks/week, exercise at least 2.5 hours/week.  We reviewed signs/symptoms of recurrence including new masses, new pigmented lesion, tugging or pulling of the skin, nipple discharge, rash in or around the chest area, or any new finding that doesn't resolve within a 2-3 weeks.  All of Fouzia's questions/concerns were addressed.  Over 25 minutes of time was spent with this patient with >50% of the time with education, counseling, and coordination of care.   I will call if any follow up needed once mammogram results are back. Otherwise, she will be notified of results via [employer] and through the mail.  I will see her back in one year with her mammogram.    Diagnoses and all orders for this visit:  Encounter for screening mammogram for malignant neoplasm of breast  Personal history of  breast cancer  -     Clinic Appointment Request Follow Up; KYLE HICKMAN; Future  -     Clinic Appointment Request Follow Up; KYLE HICKMAN  -     Referral to Plastic Surgery; Future  -     Clinic Appointment Request Follow Up; KYLE HICKMAN; Future  -     BI mammo bilateral screening tomosynthesis; Future  -     exemestane (Aromasin) 25 mg tablet; Take 1 tablet (25 mg total) by mouth once daily.  Take after a meal.  Try to take at the same time each day.  Screening for cervical cancer  -     Referral to Gynecology; Future  Oncology follow-up encounter  Breast asymmetry  History of external beam radiation therapy    Kyle Hickman, APRN-CNP

## 2025-01-06 LAB
BAND RESOLUTION: 400 BANDS
CHROM ANALY OVERALL INTERP-IMP: NORMAL
CHROMOSOME ANALYSIS CELLS ANALYZED: 20 CELLS
CHROMOSOME ANALYSIS CELLS IMAGED: 4 CELLS
CHROMOSOME ANALYSIS HYPERMODAL CELL COUNT: 0 CELLS
CHROMOSOME ANALYSIS HYPOMODAL CELL COUNT: 0 CELLS
CHROMOSOME ANALYSIS MODAL CHROMOSOME NO: 46 CHROMOSOMES
CHROMOSOME ANALYSIS STAINING METHOD: NORMAL
ELECTRONICALLY SIGNED BY CYTOGENETICS: NORMAL
KARYOTYP MAR: 2 CELLS
PATH REPORT.ADDENDUM SPEC: NORMAL
PATH REPORT.COMMENTS IMP SPEC: NORMAL
PATH REPORT.FINAL DX SPEC: NORMAL
PATH REPORT.GROSS SPEC: NORMAL
PATH REPORT.MICROSCOPIC SPEC OTHER STN: NORMAL
PATH REPORT.RELEVANT HX SPEC: NORMAL
PATH REPORT.TOTAL CANCER: NORMAL
TOTAL CELLS COUNTED MAR: 20 CELLS

## 2025-01-17 ENCOUNTER — OFFICE VISIT (OUTPATIENT)
Dept: SLEEP MEDICINE | Facility: CLINIC | Age: 65
End: 2025-01-17
Payer: COMMERCIAL

## 2025-01-17 ENCOUNTER — APPOINTMENT (OUTPATIENT)
Dept: SLEEP MEDICINE | Facility: CLINIC | Age: 65
End: 2025-01-17
Payer: COMMERCIAL

## 2025-01-17 VITALS
DIASTOLIC BLOOD PRESSURE: 85 MMHG | BODY MASS INDEX: 45.42 KG/M2 | TEMPERATURE: 97.7 F | HEART RATE: 80 BPM | HEIGHT: 65 IN | WEIGHT: 272.6 LBS | SYSTOLIC BLOOD PRESSURE: 127 MMHG

## 2025-01-17 DIAGNOSIS — G47.33 OBSTRUCTIVE SLEEP APNEA, ADULT: Primary | ICD-10-CM

## 2025-01-17 DIAGNOSIS — G47.33 OSA (OBSTRUCTIVE SLEEP APNEA): ICD-10-CM

## 2025-01-17 PROCEDURE — 3008F BODY MASS INDEX DOCD: CPT | Performed by: STUDENT IN AN ORGANIZED HEALTH CARE EDUCATION/TRAINING PROGRAM

## 2025-01-17 PROCEDURE — 1036F TOBACCO NON-USER: CPT | Performed by: STUDENT IN AN ORGANIZED HEALTH CARE EDUCATION/TRAINING PROGRAM

## 2025-01-17 PROCEDURE — 99214 OFFICE O/P EST MOD 30 MIN: CPT | Performed by: STUDENT IN AN ORGANIZED HEALTH CARE EDUCATION/TRAINING PROGRAM

## 2025-01-17 ASSESSMENT — SLEEP AND FATIGUE QUESTIONNAIRES
HOW LIKELY ARE YOU TO NOD OFF OR FALL ASLEEP WHILE SITTING AND READING: WOULD NEVER DOZE
SLEEP_PROBLEM_INTERFERES_DAILY_ACTIVITIES: A LITTLE
WAKING_TOO_EARLY: MODERATE
SITING INACTIVE IN A PUBLIC PLACE LIKE A CLASS ROOM OR A MOVIE THEATER: WOULD NEVER DOZE
HOW LIKELY ARE YOU TO NOD OFF OR FALL ASLEEP WHILE SITTING QUIETLY AFTER LUNCH WITHOUT ALCOHOL: WOULD NEVER DOZE
HOW LIKELY ARE YOU TO NOD OFF OR FALL ASLEEP WHILE LYING DOWN TO REST IN THE AFTERNOON WHEN CIRCUMSTANCES PERMIT: MODERATE CHANCE OF DOZING
HOW LIKELY ARE YOU TO NOD OFF OR FALL ASLEEP WHEN YOU ARE A PASSENGER IN A CAR FOR AN HOUR WITHOUT A BREAK: WOULD NEVER DOZE
HOW LIKELY ARE YOU TO NOD OFF OR FALL ASLEEP WHILE SITTING AND TALKING TO SOMEONE: WOULD NEVER DOZE
DIFFICULTY_FALLING_ASLEEP: MODERATE
SLEEP_PROBLEM_NOTICEABLE_TO_OTHERS: MUCH
ESS-CHAD TOTAL SCORE: 5
DIFFICULTY_STAYING_ASLEEP: MODERATE
WORRIED_DISTRESSED_DUE_TO_SLEEP: SOMEWHAT
SATISFACTION_WITH_CURRENT_SLEEP_PATTERN: VERY SATISFIED
HOW LIKELY ARE YOU TO NOD OFF OR FALL ASLEEP IN A CAR, WHILE STOPPED FOR A FEW MINUTES IN TRAFFIC: WOULD NEVER DOZE
HOW LIKELY ARE YOU TO NOD OFF OR FALL ASLEEP WHILE WATCHING TV: HIGH CHANCE OF DOZING

## 2025-01-17 ASSESSMENT — ENCOUNTER SYMPTOMS
RESPIRATORY NEGATIVE: 1
NEUROLOGICAL NEGATIVE: 1
CONSTITUTIONAL NEGATIVE: 1
CARDIOVASCULAR NEGATIVE: 1
PSYCHIATRIC NEGATIVE: 1

## 2025-01-17 NOTE — ASSESSMENT & PLAN NOTE
- doing well with PAP therapy  - she stopped in Nov because she felt that the water reservoir was hot and didn't realize that it may be because the water tank ran out of water (due to lower humidity in Nov).  - educated the patient about it and sorted out confusions with [facility], she should restart using her machine now.  - continue current setting  - renew PAP supply orders

## 2025-01-17 NOTE — PROGRESS NOTES
" Patient: Calvin Monroe    [id number]  : 1960 -- AGE 64 y.o.    Provider: Erwin Black MD     Location [facility]   Service Date: 2025              [facility]  Followup Visit Note        ASSESSMENT/PLAN     Ms. Monroe is a 64 y.o. female and she returns in followup to the [facility] for the problems listed below on 25     Problem List, Orders, Assessment, Recommendations:  Problem List Items Addressed This Visit             ICD-10-CM    Obstructive sleep apnea, adult - Primary G47.33     - doing well with PAP therapy  - she stopped in Nov because she felt that the water reservoir was hot and didn't realize that it may be because the water tank ran out of water (due to lower humidity in Nov).  - educated the patient about it and sorted out confusions with [facility], she should restart using her machine now.  - continue current setting  - renew PAP supply orders            Other Visit Diagnoses         Codes    ARIAN (obstructive sleep apnea)     G47.33    Relevant Orders    Positive Airway Pressure (PAP) Therapy    Follow Up In Adult Sleep Medicine          Disposition  Return to clinic in 12 months       HISTORY OF PRESENT ILLNESS     HISTORY OF PRESENT ILLNESS   Calvin Monroe \"Fouzia\" is a 64 y.o. female with h/o ARIAN and Obesity who presents to a [facility] for followup.     Assessment and plan from last visit: 1/15/24    Ms. Monroe is a 63 y.o. female and she returns in followup to the [facility] for ARIAN.     Problem List, Orders, Assessment, Recommendations:  Problem List Items Addressed This Visit               ICD-10-CM     Obstructive sleep apnea, adult - Primary G47.33       - doing well with PAP therapy  - continue current setting  - didn't quite like the current nasal pillow mask -> fitted with N30i today in clinic and she " liked it  - renew PAP supply orders              Relevant Orders     Positive Airway Pressure (PAP) Therapy     BMI 45.0-49.9, adult ([facility]) Z68.42           BMI Readings from Last 1 Encounters:   01/15/24 48.29 kg/m²   - encouraged healthy weight loss via diet and exercise  - consider referral to the weight loss program at follow-up visit                Current History    On today's visit, the patient reports that she stopped in Nov because she felt that the water reservoir was hot and didn't realize that it may be because the water tank ran out of water (due to lower humidity in Nov).  She also had some issues with [facility] and have not received supplies ([facility] liaison to sort out today).  Otherwise, doing well, weight loss progressing (20# last year ) and is working on losing more    PAP Info  D[employer]ABLE [employer]: [employer]  Issues with therapy: ISSUES WITH THERAPY: None  Benefits with PAP: PERCEIVED BENEFITS OF PAP: refreshing sleep    PAP Adherence  A PAP adherence download was obtained and data was reviewed personally today in clinic.    RLS Followup:   none.    Daytime Symptoms    Patient reports DAYTIME SYMPTOMS: no daytime symptoms  Patient denies daytime symptoms including: Denies: excessive daytime sleepiness    Naps: No  Fatigue: denies feeling fatigue    ESS: 5  REYNA: 12  FOSQ: 39    REVIEW OF SYSTEMS     REVIEW OF SYSTEMS  Review of Systems   Constitutional: Negative.    HENT: Negative.     Respiratory: Negative.     Cardiovascular: Negative.    Genitourinary: Negative.    Skin: Negative.    Neurological: Negative.    Psychiatric/Behavioral: Negative.           ALLERGIES AND MEDICATIONS     ALLERGIES  Allergies   Allergen Reactions    Adhesive Tape-Silicones Itching    Latex Hives    Meperidine Other     GI upset     Morphine Other     GI upset       MEDICATIONS: She has a current medication list which includes the following prescription(s): diclofenac sodium - Apply 4.5 inches (4  "g) topically 4 times a day, trulicity - INJECT 1.5 MG UNDER THE SKIN 1 (ONE) TIME PER WEEK, exemestane - Take 1 tablet (25 mg total) by mouth once daily.  Take after a meal.  Try to take at the same time each day, and venlafaxine xr - Take 1 capsule (37.5 mg) by mouth once daily. Do not crush or chew.    PAST MEDICAL HISTORY : She  has a past medical history of Anxiety disorder, unspecified, Arthritis, Bilious vomiting (04/26/2016), Breast CA (Multi), Headache, unspecified (02/03/2017), Irritable bowel syndrome with diarrhea (04/07/2016), Mammographic calcification found on diagnostic imaging of breast (06/01/2015), ARIAN (obstructive sleep apnea), Overactive bladder, Personal history of diseases of the blood and blood-forming organs and certain disorders involving the immune mechanism, Personal history of diseases of the skin and subcutaneous tissue (07/27/2018), Personal history of irradiation, Personal history of other diseases of the female genital tract (08/01/2017), Personal history of other mental and behavioral disorders, and Personal history of other specified conditions.    PAST SURGICAL HISTORY: She  has a past surgical history that includes Other surgical history (10/17/2014); Hernia repair (10/17/2014); Other surgical history (08/03/2021); MR angio head wo IV contrast (06/12/2021); MR angio neck wo IV contrast (06/12/2021); Breast lumpectomy (Right, 08/2021); and Fracture surgery.     FAMILY HISTORY: No changes since previous visit. Otherwise non-contributory as charted.     SOCIAL HISTORY  She  reports that she has never smoked. She has never used smokeless tobacco. She reports current alcohol use. She reports that she does not use drugs.       PHYSICAL EXAM     VITAL SIGNS: /85 (BP Location: Left arm, Patient Position: Sitting, BP Cuff Size: Adult)   Pulse 80   Temp 36.5 °C (97.7 °F) (Temporal)   Ht 1.651 m (5' 5\")   Wt 124 kg (272 lb 9.6 oz)   LMP  (LMP Unknown)   BMI 45.36 kg/m²  "     PREVIOUS WEIGHTS:  Wt Readings from Last 3 Encounters:   01/17/25 124 kg (272 lb 9.6 oz)   01/03/25 125 kg (275 lb 9.2 oz)   11/07/24 125 kg (276 lb)         RESULTS/DATA     Bicarbonate (mmol/L)   Date Value   05/08/2024 27   04/10/2024 28   02/01/2024 26     Iron (ug/dL)   Date Value   09/09/2024 34 (L)   05/08/2024 42   02/01/2024 49   02/01/2024 50     % Saturation (%)   Date Value   09/09/2024 12 (L)   05/08/2024 15 (L)   02/01/2024 18 (L)   02/01/2024 18 (L)     TIBC (ug/dL)   Date Value   09/09/2024 286   05/08/2024 284   02/01/2024 278   02/01/2024 283     Ferritin (ng/mL)   Date Value   09/09/2024 433 (H)   05/08/2024 476 (H)   04/10/2024 418 (H)

## 2025-01-17 NOTE — ASSESSMENT & PLAN NOTE
BMI Readings from Last 1 Encounters:   01/17/25 45.36 kg/m²     - encouraged healthy weight loss via diet and exercise  - have lost 20# since last year, working well with her PCP.  She is also adding exercise and diet control in her routine.  Doing well.

## 2025-01-29 ENCOUNTER — APPOINTMENT (OUTPATIENT)
Dept: DERMATOLOGY | Facility: CLINIC | Age: 65
End: 2025-01-29
Payer: COMMERCIAL

## 2025-01-29 DIAGNOSIS — I78.1 SPIDER ANGIOMA: ICD-10-CM

## 2025-01-29 DIAGNOSIS — D22.9 NEVUS: Primary | ICD-10-CM

## 2025-01-29 DIAGNOSIS — L82.1 SEBORRHEIC KERATOSIS: ICD-10-CM

## 2025-01-29 DIAGNOSIS — Z12.83 SCREENING EXAM FOR SKIN CANCER: ICD-10-CM

## 2025-01-29 DIAGNOSIS — L81.4 LENTIGO: ICD-10-CM

## 2025-01-29 DIAGNOSIS — L91.0 KELOID: ICD-10-CM

## 2025-01-29 PROCEDURE — 99213 OFFICE O/P EST LOW 20 MIN: CPT | Performed by: NURSE PRACTITIONER

## 2025-01-29 NOTE — PROGRESS NOTES
Subjective     Fouzia Monroe is a 64 y.o. female who presents for the following: Skin Check.   Established patient in for yearly full body skin exam.     Review of Systems:  No other skin or systemic complaints other than what is documented elsewhere in the note.    The following portions of the chart were reviewed this encounter and updated as appropriate:       Skin Cancer History  No skin cancer on file.    Specialty Problems          Dermatology Problems    Hemangioma of skin and subcutaneous tissue    Melanocytic nevi of trunk    Neoplasm of uncertain behavior of skin    Other melanin hyperpigmentation    Other seborrheic keratosis    Sebaceous cyst     Past Medical History:  Fouzia Monroe  has a past medical history of Anxiety disorder, unspecified, Arthritis, Bilious vomiting (04/26/2016), Breast CA (Multi), Headache, unspecified (02/03/2017), Irritable bowel syndrome with diarrhea (04/07/2016), Mammographic calcification found on diagnostic imaging of breast (06/01/2015), ARIAN (obstructive sleep apnea), Overactive bladder, Personal history of diseases of the blood and blood-forming organs and certain disorders involving the immune mechanism, Personal history of diseases of the skin and subcutaneous tissue (07/27/2018), Personal history of irradiation, Personal history of other diseases of the female genital tract (08/01/2017), Personal history of other mental and behavioral disorders, and Personal history of other specified conditions.    Past Surgical History:  Fouzia Monroe  has a past surgical history that includes Other surgical history (10/17/2014); Hernia repair (10/17/2014); Other surgical history (08/03/2021); MR angio head wo IV contrast (06/12/2021); MR angio neck wo IV contrast (06/12/2021); Breast lumpectomy (Right, 08/2021); and Fracture surgery.    Family History:  Patient family history includes Alzheimer's disease in her father; Asthma in her brother; Breast  cancer in her paternal grandmother and another family member; Diabetes in her father; Lung cancer in her father; Ovarian cancer in an other family member; Stomach cancer in her maternal grandmother; Stroke in an other family member; cardiac disorder in an other family member; murder in her mother.    Social History:  Fouzia Monroe  reports that she has never smoked. She has never used smokeless tobacco. She reports current alcohol use. She reports that she does not use drugs.    Allergies:  Adhesive tape-silicones, Latex, Meperidine, and Morphine    Current Medications / CAM's:    Current Outpatient Medications:     diclofenac sodium (Voltaren) 1 % gel, Apply 4.5 inches (4 g) topically 4 times a day., Disp: 100 g, Rfl: 3    dulaglutide (Trulicity) 1.5 mg/0.5 mL pen injector injection, INJECT 1.5 MG UNDER THE SKIN 1 (ONE) TIME PER WEEK., Disp: 3 each, Rfl: 1    exemestane (Aromasin) 25 mg tablet, Take 1 tablet (25 mg total) by mouth once daily.  Take after a meal.  Try to take at the same time each day., Disp: 30 tablet, Rfl: 1    venlafaxine XR (Effexor XR) 37.5 mg 24 hr capsule, Take 1 capsule (37.5 mg) by mouth once daily. Do not crush or chew., Disp: 90 capsule, Rfl: 1     Objective   Well appearing patient in no apparent distress; mood and affect are within normal limits.      Assessment/Plan   1. Nevus  Uniform pigmented macule(s)/papule(s) with reassuring findings on dermoscopy    -Discussed nature of condition  -Reassurance, benign-appearing features on examination today  -Recommend continued observation    2. Lentigo  Tan macules    -Benign appearing on exam  -Reassurance, recommend observation    3. Seborrheic keratosis  Stuck on, waxy macule(s)/papule(s)/plaque(s) with comedo-like openings and milia like cysts    -Discussed nature of condition  -Reassurance, recommend continued observation    4. Screening exam for skin cancer    Follow Up In Dermatology    5. Keloid  Right Upper Back  Fibrotic  nodular plaque(s) improved with ILK injection 1 year ago    -Discussed nature of condition  -Discussed treatment options  -Recommend no treatment today    6. Spider angioma  Right Upper Arm - Anterior  Cherry red papule    -Discussed nature of condition  -Reassurance, recommend continued observation

## 2025-02-04 ENCOUNTER — OFFICE VISIT (OUTPATIENT)
Dept: PLASTIC SURGERY | Facility: CLINIC | Age: 65
End: 2025-02-04
Payer: COMMERCIAL

## 2025-02-04 VITALS
SYSTOLIC BLOOD PRESSURE: 141 MMHG | WEIGHT: 277 LBS | DIASTOLIC BLOOD PRESSURE: 84 MMHG | HEART RATE: 78 BPM | BODY MASS INDEX: 46.1 KG/M2 | TEMPERATURE: 98.8 F | OXYGEN SATURATION: 97 %

## 2025-02-04 DIAGNOSIS — N64.89 POSTOPERATIVE BREAST ASYMMETRY: Primary | ICD-10-CM

## 2025-02-04 DIAGNOSIS — Z85.3 PERSONAL HISTORY OF BREAST CANCER: ICD-10-CM

## 2025-02-04 DIAGNOSIS — Z01.818 PRE-OPERATIVE CLEARANCE: ICD-10-CM

## 2025-02-04 PROCEDURE — 99214 OFFICE O/P EST MOD 30 MIN: CPT | Performed by: SURGERY

## 2025-02-04 PROCEDURE — 99204 OFFICE O/P NEW MOD 45 MIN: CPT | Performed by: SURGERY

## 2025-02-04 ASSESSMENT — PAIN SCALES - GENERAL: PAINLEVEL_OUTOF10: 0-NO PAIN

## 2025-02-04 NOTE — PROGRESS NOTES
Plastic Surgery Clinic Visit  Breast Reconstruction    Patient Name: Calvin Monroe  MRN: [id number]  Date:  02/04/25     Subjective  Calvin Monroe is a 64 y.o. female with a hx of T1bN0 G-2 right ILC diagnosed July 2021. She is s/p partial mastectomy, SLNB, XRT, and initiated anastrozole/letrozole March 2022 . She is referred by BENEDICTO Vo for breast reconstruction options.  She has significant asymmetry of her cancer breast and her natural breast.  This causes postural changes and back and shoulder and neck pain.  She also has difficulties finding a bra as there is significant size and volume variation between her breasts.  The lack of an adequately fitting bra adds to the difficulties with her posture and musculoskeletal pain.    She has someone who can help her out with post op care, she has a pet dog who sleeps with her mostly.     Last Mammogram: 1/3/25    Past Medical History:   Diagnosis Date    Anxiety disorder, unspecified     Anxiety    Arthritis     Bilious vomiting 04/26/2016    Bilious vomiting with nausea    Breast CA (Multi)     Headache, unspecified 02/03/2017    Headache    Irritable bowel syndrome with diarrhea 04/07/2016    Irritable bowel syndrome with diarrhea    Mammographic calcification found on diagnostic imaging of breast 06/01/2015    Breast calcification, right    ARIAN (obstructive sleep apnea)     Overactive bladder     OAB (overactive bladder)    Personal history of diseases of the blood and blood-forming organs and certain disorders involving the immune mechanism     History of anemia    Personal history of diseases of the skin and subcutaneous tissue 07/27/2018    History of cyst of breast    Personal history of irradiation     Personal history of other diseases of the female genital tract 08/01/2017    History of breast pain    Personal history of other mental and behavioral disorders     History of depression    Personal history of other specified conditions      History of lump of left breast     Past Surgical History:   Procedure Laterality Date    BREAST LUMPECTOMY Right 08/2021    FRACTURE SURGERY      HERNIA REPAIR  10/17/2014    Hernia Repair    MR HEAD ANGIO WO IV CONTRAST  06/12/2021    MR HEAD ANGIO WO IV CONTRAST 6/12/2021 [facility]    MR NECK ANGIO WO IV CONTRAST  06/12/2021    MR NECK ANGIO WO IV CONTRAST 6/12/2021 [facility]    OTHER SURGICAL HISTORY  10/17/2014    Laparoscopic Excision Of Ectopic Pregnancy And Salpingectomy    OTHER SURGICAL HISTORY  08/03/2021    Knee arthroscopy     Allergies   Allergen Reactions    Adhesive Tape-Silicones Itching    Latex Hives    Meperidine Other     GI upset     Morphine Other     GI upset       Current Outpatient Medications:     diclofenac sodium (Voltaren) 1 % gel, Apply 4.5 inches (4 g) topically 4 times a day., Disp: 100 g, Rfl: 3    dulaglutide (Trulicity) 1.5 mg/0.5 mL pen injector injection, INJECT 1.5 MG UNDER THE SKIN 1 (ONE) TIME PER WEEK., Disp: 3 each, Rfl: 1    exemestane (Aromasin) 25 mg tablet, Take 1 tablet (25 mg total) by mouth once daily.  Take after a meal.  Try to take at the same time each day., Disp: 30 tablet, Rfl: 1    venlafaxine XR (Effexor XR) 37.5 mg 24 hr capsule, Take 1 capsule (37.5 mg) by mouth once daily. Do not crush or chew., Disp: 90 capsule, Rfl: 1   Family History   Problem Relation Name Age of Onset    Other (murder) Mother      Alzheimer's disease Father      Diabetes Father      Lung cancer Father      Asthma Brother      Stomach cancer Maternal Grandmother      Breast cancer Paternal Grandmother      Other (cardiac disorder) Other      Stroke Other      Ovarian cancer Other      Breast cancer Other       Social History     Tobacco Use    Smoking status: Never    Smokeless tobacco: Never   Vaping Use    Vaping status: Never Used   Substance Use Topics    Alcohol use: Yes     Comment: occasional glass of wine    Drug use: Never       ROS:  ROS: All 10 systems  were reviewed and are unremarkable except for those mentioned in HPI.    Objective    /84 (BP Location: Right arm, Patient Position: Sitting, BP Cuff Size: Adult)   Pulse 78   Temp 37.1 °C (98.8 °F) (Temporal)   Wt 126 kg (277 lb)   LMP  (LMP Unknown)   SpO2 97%   BMI 46.10 kg/m²      Physical Exam:   Constitutional: A&Ox3, calm and cooperative, NAD  Eyes: PERRL, clear sclera   ENMT: Moist mucous membranes, no apparent injuries or lesions  Head/Neck: Neck supple, no JVD  Cardiovascular: Normal rate and regular rhythm, 2+ equal pulses of the distal extremities  Respiratory/Thorax: CTAB, regular respirations on RA, good symmetric chest expansion  Gastrointestinal: Abdomen soft, non tender   Extremities: No peripheral edema  Neurological: A&Ox3  Psychological: Appropriate mood and behavior  Skin: Warm and dry with no lesions or rashes    Breast Exam:      Left Breast:  NTN: 36.5  BW: 22.5  Comments: grade 3 ptosis, folds are almost equal, asymmetry 250g larger than right    Right Breast:  NTN: 35  BW: 22.5    Comments: Barely perceptible scar superior periareolar.  No dents or breast contour deformity      Photos  Completed at this visit 2/4/25     Diagnostics   No results found for this or any previous visit (from the past 24 hours).  No results found.    Assessment/Plan  Calvin Monroe is a 64 y.o. female with a hx of T1bN0 G-2 right ILC diagnosed July 2021. She is s/p partial mastectomy, SLNB, XRT, and initiated anastrozole/letrozole March 2022 . She is referred by BENEDICTO Vo for breast reconstruction options. She had a lumpectomy about 3 years ago. She is bothered by left breast enlargement and asymmetry, will likely be needing a reduction. Discussed it has been a while since her last labs, will re-check her labs first. She has a BMI of 46, weighs about 277 lbs, has lost about 20lbs, on medication since 3/2024 and physical activity. She is curently following up with her PCP and nutritionist  for additional weight loss, her goal is to go under 200 lbs. We discussed if she would like to continue with weight loss prior to considering a reduction as this will decrease her risk factors. She is most bothered by the appearance of her left breast, and is inclined to proceed with the surgery, even if she is left with asymmetry in the future after additional weight loss.  She understands that doing surgery at the BMI of 46 has increased risks of infection, wound healing, dehiscence and tissue necrosis including the nipple.  We will start with getting labs as a first step to assess hemoglobin A1c and other nutritional parameters.      Scribe Attestation  By signing my name below, Gabby COHEN Scribe, attest that this documentation has been prepared under the direction and in the presence of Liza Glass MD. Verbal consent obtained from the patient.     Attending Attestation:  Liza COHEN MD, personal performed the history, exam, and decision making on this patient.  A total of 45 mins were spent in patient counseling, review of medical records, and coordination of care.

## 2025-02-14 ENCOUNTER — TELEPHONE (OUTPATIENT)
Dept: HEMATOLOGY/ONCOLOGY | Facility: CLINIC | Age: 65
End: 2025-02-14
Payer: COMMERCIAL

## 2025-02-17 ENCOUNTER — LAB (OUTPATIENT)
Dept: LAB | Facility: HOSPITAL | Age: 65
End: 2025-02-17
Payer: COMMERCIAL

## 2025-02-17 DIAGNOSIS — R79.89 ELEVATED FERRITIN: ICD-10-CM

## 2025-02-17 DIAGNOSIS — D50.9 IRON DEFICIENCY ANEMIA, UNSPECIFIED IRON DEFICIENCY ANEMIA TYPE: ICD-10-CM

## 2025-02-17 DIAGNOSIS — K76.0 FATTY LIVER: ICD-10-CM

## 2025-02-17 DIAGNOSIS — Z85.3 PERSONAL HISTORY OF BREAST CANCER: ICD-10-CM

## 2025-02-17 PROCEDURE — 82607 VITAMIN B-12: CPT

## 2025-02-17 PROCEDURE — 83550 IRON BINDING TEST: CPT

## 2025-02-17 PROCEDURE — 85025 COMPLETE CBC W/AUTO DIFF WBC: CPT

## 2025-02-17 PROCEDURE — 82728 ASSAY OF FERRITIN: CPT

## 2025-02-17 PROCEDURE — 80053 COMPREHEN METABOLIC PANEL: CPT

## 2025-02-17 PROCEDURE — 83540 ASSAY OF IRON: CPT

## 2025-02-18 ENCOUNTER — TELEPHONE (OUTPATIENT)
Dept: PRIMARY CARE | Facility: CLINIC | Age: 65
End: 2025-02-18
Payer: COMMERCIAL

## 2025-02-18 DIAGNOSIS — R79.89 LOW VITAMIN D LEVEL: Primary | ICD-10-CM

## 2025-02-18 LAB
25(OH)D3+25(OH)D2 SERPL-MCNC: 22 NG/ML (ref 30–100)
ALBUMIN SERPL BCP-MCNC: 4.1 G/DL (ref 3.4–5)
ALP SERPL-CCNC: 83 U/L (ref 33–136)
ALT SERPL W P-5'-P-CCNC: 8 U/L (ref 7–45)
ANION GAP SERPL CALC-SCNC: 12 MMOL/L (ref 10–20)
AST SERPL W P-5'-P-CCNC: 8 U/L (ref 9–39)
BASOPHILS # BLD AUTO: 0.05 X10*3/UL (ref 0–0.1)
BASOPHILS NFR BLD AUTO: 0.7 %
BILIRUB SERPL-MCNC: 0.3 MG/DL (ref 0–1.2)
BUN SERPL-MCNC: 15 MG/DL (ref 6–23)
CALCIUM SERPL-MCNC: 9.6 MG/DL (ref 8.6–10.6)
CHLORIDE SERPL-SCNC: 104 MMOL/L (ref 98–107)
CHOLEST SERPL-MCNC: 194 MG/DL
CHOLEST/HDLC SERPL: 3.6 (CALC)
CO2 SERPL-SCNC: 27 MMOL/L (ref 21–32)
CREAT SERPL-MCNC: 0.75 MG/DL (ref 0.5–1.05)
EGFRCR SERPLBLD CKD-EPI 2021: 90 ML/MIN/1.73M*2
EOSINOPHIL # BLD AUTO: 0.17 X10*3/UL (ref 0–0.7)
EOSINOPHIL NFR BLD AUTO: 2.3 %
ERYTHROCYTE [DISTWIDTH] IN BLOOD BY AUTOMATED COUNT: 15.3 % (ref 11.5–14.5)
EST. AVERAGE GLUCOSE BLD GHB EST-MCNC: 120 MG/DL
EST. AVERAGE GLUCOSE BLD GHB EST-SCNC: 6.6 MMOL/L
FERRITIN SERPL-MCNC: 395 NG/ML (ref 8–150)
GLUCOSE SERPL-MCNC: 105 MG/DL (ref 74–99)
HBA1C MFR BLD: 5.8 % OF TOTAL HGB
HCT VFR BLD AUTO: 42.3 % (ref 36–46)
HDLC SERPL-MCNC: 54 MG/DL
HGB BLD-MCNC: 12.8 G/DL (ref 12–16)
IMM GRANULOCYTES # BLD AUTO: 0.01 X10*3/UL (ref 0–0.7)
IMM GRANULOCYTES NFR BLD AUTO: 0.1 % (ref 0–0.9)
IRON SATN MFR SERPL: 14 % (ref 25–45)
IRON SERPL-MCNC: 42 UG/DL (ref 35–150)
LDLC SERPL CALC-MCNC: 123 MG/DL (CALC)
LYMPHOCYTES # BLD AUTO: 3.14 X10*3/UL (ref 1.2–4.8)
LYMPHOCYTES NFR BLD AUTO: 42.9 %
MCH RBC QN AUTO: 25.9 PG (ref 26–34)
MCHC RBC AUTO-ENTMCNC: 30.3 G/DL (ref 32–36)
MCV RBC AUTO: 86 FL (ref 80–100)
MONOCYTES # BLD AUTO: 0.42 X10*3/UL (ref 0.1–1)
MONOCYTES NFR BLD AUTO: 5.7 %
NEUTROPHILS # BLD AUTO: 3.53 X10*3/UL (ref 1.2–7.7)
NEUTROPHILS NFR BLD AUTO: 48.3 %
NONHDLC SERPL-MCNC: 140 MG/DL (CALC)
NRBC BLD-RTO: 0 /100 WBCS (ref 0–0)
PLATELET # BLD AUTO: 332 X10*3/UL (ref 150–450)
POTASSIUM SERPL-SCNC: 4.3 MMOL/L (ref 3.5–5.3)
PROT SERPL-MCNC: 7.3 G/DL (ref 6.4–8.2)
RBC # BLD AUTO: 4.94 X10*6/UL (ref 4–5.2)
SODIUM SERPL-SCNC: 139 MMOL/L (ref 136–145)
TIBC SERPL-MCNC: 291 UG/DL (ref 240–445)
TRIGL SERPL-MCNC: 80 MG/DL
UIBC SERPL-MCNC: 249 UG/DL (ref 110–370)
VIT B12 SERPL-MCNC: 514 PG/ML (ref 211–911)
WBC # BLD AUTO: 7.3 X10*3/UL (ref 4.4–11.3)

## 2025-02-18 RX ORDER — ERGOCALCIFEROL 1.25 MG/1
50000 CAPSULE ORAL WEEKLY
Qty: 12 CAPSULE | Refills: 0 | Status: SHIPPED | OUTPATIENT
Start: 2025-02-18 | End: 2025-05-13

## 2025-02-18 NOTE — TELEPHONE ENCOUNTER
Called patient with lab result, patient acknowledge understanding will  medication from pharmacy.  ----- Message from Joy Castle sent at 2/18/2025  1:24 PM EST -----  Tell the patient I ordered weekly high dose vitamin d.

## 2025-02-24 ENCOUNTER — APPOINTMENT (OUTPATIENT)
Dept: OBSTETRICS AND GYNECOLOGY | Facility: CLINIC | Age: 65
End: 2025-02-24
Payer: COMMERCIAL

## 2025-02-24 VITALS
BODY MASS INDEX: 45.32 KG/M2 | HEIGHT: 65 IN | WEIGHT: 272 LBS | DIASTOLIC BLOOD PRESSURE: 74 MMHG | SYSTOLIC BLOOD PRESSURE: 118 MMHG

## 2025-02-24 DIAGNOSIS — Z01.419 WELL WOMAN EXAM WITH ROUTINE GYNECOLOGICAL EXAM: Primary | ICD-10-CM

## 2025-02-24 DIAGNOSIS — Z12.4 SCREENING FOR CERVICAL CANCER: ICD-10-CM

## 2025-02-24 PROCEDURE — 99386 PREV VISIT NEW AGE 40-64: CPT | Performed by: OBSTETRICS & GYNECOLOGY

## 2025-02-24 PROCEDURE — 1036F TOBACCO NON-USER: CPT | Performed by: OBSTETRICS & GYNECOLOGY

## 2025-02-24 PROCEDURE — 88175 CYTOPATH C/V AUTO FLUID REDO: CPT

## 2025-02-24 PROCEDURE — 87624 HPV HI-RISK TYP POOLED RSLT: CPT

## 2025-02-24 PROCEDURE — 3008F BODY MASS INDEX DOCD: CPT | Performed by: OBSTETRICS & GYNECOLOGY

## 2025-02-24 PROCEDURE — 99459 PELVIC EXAMINATION: CPT | Performed by: OBSTETRICS & GYNECOLOGY

## 2025-02-24 ASSESSMENT — PAIN SCALES - GENERAL: PAINLEVEL_OUTOF10: 0-NO PAIN

## 2025-02-24 NOTE — PROGRESS NOTES
"History Of Present Illness  Routine Gyn Exam  Modenia V Avery-Field \"Fouzia\" here for routine WWE. Current complaints: New to the office.  She is not currently sexually active.  Pt with a h/o right sided breast cancer (dx'd ) and sees oncology regularly.     Gynecologic History  No LMP recorded (lmp unknown). Patient is postmenopausal.  Contraception: none  Last Pap: . Results were: normal cytology only  Last mammogram: through breast center  Last Colonoscopy:  . Results were: normal' repeat   Last DEXA: NA. Results were: NA  Lipid/DM: with PCP    Obstetric History  OB History    Para Term  AB Living   3 3 3 0 0 3   SAB IAB Ectopic Multiple Live Births   0 0 0 0 3      # Outcome Date GA Lbr Manjinder/2nd Weight Sex Type Anes PTL Lv   3 Term       None     2 Term       None     1 Term       None          Past Medical History  She has a past medical history of Anxiety disorder, unspecified, Arthritis, Bilious vomiting (2016), Breast CA (Multi), Headache, unspecified (2017), Irritable bowel syndrome with diarrhea (2016), Mammographic calcification found on diagnostic imaging of breast (2015), ARIAN (obstructive sleep apnea), Overactive bladder, Personal history of diseases of the blood and blood-forming organs and certain disorders involving the immune mechanism, Personal history of diseases of the skin and subcutaneous tissue (2018), Personal history of irradiation, Personal history of other diseases of the female genital tract (2017), Personal history of other mental and behavioral disorders, and Personal history of other specified conditions.    Surgical History  She has a past surgical history that includes Other surgical history (10/17/2014); Hernia repair (10/17/2014); Other surgical history (2021); MR angio head wo IV contrast (2021); MR angio neck wo IV contrast (2021); Breast lumpectomy (Right, 2021); and Fracture surgery.   "   Social History  She reports that she has never smoked. She has never used smokeless tobacco. She reports current alcohol use. She reports that she does not use drugs.    Family History  Family History   Problem Relation Name Age of Onset    Other (murder) Mother      Alzheimer's disease Father      Diabetes Father      Lung cancer Father      Asthma Brother      Stomach cancer Maternal Grandmother      Breast cancer Paternal Grandmother      Other (cardiac disorder) Other      Stroke Other      Ovarian cancer Other      Breast cancer Other          Meds:    Current Outpatient Medications:     diclofenac sodium (Voltaren) 1 % gel, Apply 4.5 inches (4 g) topically 4 times a day., Disp: 100 g, Rfl: 3    dulaglutide (Trulicity) 1.5 mg/0.5 mL pen injector injection, INJECT 1.5 MG UNDER THE SKIN 1 (ONE) TIME PER WEEK., Disp: 3 each, Rfl: 1    ergocalciferol (Vitamin D-2) 1.25 MG (05793 UT) capsule, Take 1 capsule (50,000 Units) by mouth 1 (one) time per week., Disp: 12 capsule, Rfl: 0    exemestane (Aromasin) 25 mg tablet, Take 1 tablet (25 mg total) by mouth once daily.  Take after a meal.  Try to take at the same time each day., Disp: 30 tablet, Rfl: 1    venlafaxine XR (Effexor XR) 37.5 mg 24 hr capsule, Take 1 capsule (37.5 mg) by mouth once daily. Do not crush or chew., Disp: 90 capsule, Rfl: 1    Allergies  Adhesive tape-silicones, Latex, Meperidine, and Morphine     Chaperone: present  Physical Exam  Exam conducted with a chaperone present.   Constitutional:       Appearance: Normal appearance.   Pulmonary:      Effort: Pulmonary effort is normal.   Chest:   Breasts:     Left: No mass.   Abdominal:      General: Abdomen is flat. There is no distension.      Palpations: Abdomen is soft.      Tenderness: There is no abdominal tenderness.   Genitourinary:     Labia:         Right: No rash, tenderness or lesion.         Left: No rash, tenderness or lesion.       Urethra: No prolapse.      Vagina: Normal. No vaginal  "discharge or bleeding.      Cervix: No friability or lesion.      Uterus: Normal. Not enlarged and not tender.       Adnexa:         Right: No mass, tenderness or fullness.          Left: No mass, tenderness or fullness.        Rectum: Normal.   Neurological:      General: No focal deficit present.      Mental Status: She is alert.   Psychiatric:         Mood and Affect: Mood normal.     Breast exam deferred given evals with [facility].     Last Recorded Vitals  Blood pressure 118/74, height 1.651 m (5' 5\"), weight 123 kg (272 lb), not currently breastfeeding.    Assessment/Plan   Problem List Items Addressed This Visit       Screening for cervical cancer     Other Visit Diagnoses       Well woman exam with routine gynecological exam    -  Primary    Relevant Orders    THINPREP PAP TEST            Contraception: NA  PAP: obtained   Mammogram: up to date   Colonoscopy:  due 2033    DEXA: due age 66    Lipid/DM: obtained through PCP     Erwin Goodwin MD  "

## 2025-03-10 ENCOUNTER — APPOINTMENT (OUTPATIENT)
Dept: PRIMARY CARE | Facility: CLINIC | Age: 65
End: 2025-03-10
Payer: COMMERCIAL

## 2025-03-11 LAB
CYTOLOGY CMNT CVX/VAG CYTO-IMP: NORMAL
HPV HR 12 DNA GENITAL QL NAA+PROBE: NEGATIVE
HPV HR GENOTYPES PNL CVX NAA+PROBE: POSITIVE
HPV16 DNA SPEC QL NAA+PROBE: POSITIVE
HPV18 DNA SPEC QL NAA+PROBE: NEGATIVE
LAB AP HPV GENOTYPE QUESTION: YES
LAB AP HPV HR: NORMAL
LABORATORY COMMENT REPORT: NORMAL
PATH REPORT.TOTAL CANCER: NORMAL

## 2025-03-12 ENCOUNTER — TELEPHONE (OUTPATIENT)
Dept: OBSTETRICS AND GYNECOLOGY | Facility: CLINIC | Age: 65
End: 2025-03-12
Payer: COMMERCIAL

## 2025-03-19 ENCOUNTER — OFFICE VISIT (OUTPATIENT)
Dept: HEMATOLOGY/ONCOLOGY | Facility: CLINIC | Age: 65
End: 2025-03-19
Payer: COMMERCIAL

## 2025-03-19 ENCOUNTER — APPOINTMENT (OUTPATIENT)
Dept: RADIOLOGY | Facility: HOSPITAL | Age: 65
End: 2025-03-19
Payer: COMMERCIAL

## 2025-03-19 ENCOUNTER — HOSPITAL ENCOUNTER (OUTPATIENT)
Dept: RADIOLOGY | Facility: HOSPITAL | Age: 65
Discharge: HOME | End: 2025-03-19
Payer: COMMERCIAL

## 2025-03-19 ENCOUNTER — APPOINTMENT (OUTPATIENT)
Dept: CARDIOLOGY | Facility: HOSPITAL | Age: 65
End: 2025-03-19
Payer: COMMERCIAL

## 2025-03-19 ENCOUNTER — OFFICE VISIT (OUTPATIENT)
Dept: PRIMARY CARE | Facility: CLINIC | Age: 65
End: 2025-03-19
Payer: COMMERCIAL

## 2025-03-19 VITALS
BODY MASS INDEX: 44.81 KG/M2 | DIASTOLIC BLOOD PRESSURE: 80 MMHG | OXYGEN SATURATION: 95 % | TEMPERATURE: 98.1 F | SYSTOLIC BLOOD PRESSURE: 120 MMHG | RESPIRATION RATE: 17 BRPM | HEART RATE: 92 BPM | WEIGHT: 269.29 LBS

## 2025-03-19 DIAGNOSIS — R79.89 ELEVATED FERRITIN: ICD-10-CM

## 2025-03-19 DIAGNOSIS — Z85.3 PERSONAL HISTORY OF BREAST CANCER: Primary | ICD-10-CM

## 2025-03-19 DIAGNOSIS — D50.9 IRON DEFICIENCY ANEMIA, UNSPECIFIED IRON DEFICIENCY ANEMIA TYPE: ICD-10-CM

## 2025-03-19 DIAGNOSIS — C50.911 MALIGNANT NEOPLASM OF RIGHT FEMALE BREAST, UNSPECIFIED ESTROGEN RECEPTOR STATUS, UNSPECIFIED SITE OF BREAST: ICD-10-CM

## 2025-03-19 DIAGNOSIS — M79.606 PAIN OF LOWER EXTREMITY, UNSPECIFIED LATERALITY: Primary | ICD-10-CM

## 2025-03-19 DIAGNOSIS — R06.02 SOB (SHORTNESS OF BREATH) ON EXERTION: ICD-10-CM

## 2025-03-19 DIAGNOSIS — R79.89 D-DIMER, ELEVATED: ICD-10-CM

## 2025-03-19 DIAGNOSIS — K76.0 FATTY LIVER: ICD-10-CM

## 2025-03-19 LAB
ALBUMIN SERPL BCP-MCNC: 4 G/DL (ref 3.4–5)
ALBUMIN SERPL BCP-MCNC: 4.1 G/DL (ref 3.4–5)
ALP SERPL-CCNC: 73 U/L (ref 33–136)
ALP SERPL-CCNC: 73 U/L (ref 33–136)
ALT SERPL W P-5'-P-CCNC: 7 U/L (ref 7–45)
ALT SERPL W P-5'-P-CCNC: 8 U/L (ref 7–45)
ANION GAP SERPL CALC-SCNC: 11 MMOL/L (ref 10–20)
ANION GAP SERPL CALC-SCNC: 12 MMOL/L (ref 10–20)
AST SERPL W P-5'-P-CCNC: 11 U/L (ref 9–39)
AST SERPL W P-5'-P-CCNC: 11 U/L (ref 9–39)
BASOPHILS # BLD AUTO: 0.01 X10*3/UL (ref 0–0.1)
BASOPHILS NFR BLD AUTO: 0.2 %
BILIRUB SERPL-MCNC: 0.5 MG/DL (ref 0–1.2)
BILIRUB SERPL-MCNC: 0.5 MG/DL (ref 0–1.2)
BNP SERPL-MCNC: 9 PG/ML (ref 0–99)
BUN SERPL-MCNC: 14 MG/DL (ref 6–23)
BUN SERPL-MCNC: 14 MG/DL (ref 6–23)
CALCIUM SERPL-MCNC: 9.4 MG/DL (ref 8.6–10.3)
CALCIUM SERPL-MCNC: 9.4 MG/DL (ref 8.6–10.3)
CARDIAC TROPONIN I PNL SERPL HS: 3 NG/L (ref 0–13)
CHLORIDE SERPL-SCNC: 103 MMOL/L (ref 98–107)
CHLORIDE SERPL-SCNC: 103 MMOL/L (ref 98–107)
CO2 SERPL-SCNC: 28 MMOL/L (ref 21–32)
CO2 SERPL-SCNC: 28 MMOL/L (ref 21–32)
CREAT SERPL-MCNC: 0.79 MG/DL (ref 0.5–1.05)
CREAT SERPL-MCNC: 0.79 MG/DL (ref 0.5–1.05)
D DIMER PPP FEU-MCNC: 1132 NG/ML FEU
D DIMER PPP FEU-MCNC: 1133 NG/ML FEU
EGFRCR SERPLBLD CKD-EPI 2021: 84 ML/MIN/1.73M*2
EGFRCR SERPLBLD CKD-EPI 2021: 84 ML/MIN/1.73M*2
EOSINOPHIL # BLD AUTO: 0.17 X10*3/UL (ref 0–0.7)
EOSINOPHIL NFR BLD AUTO: 2.8 %
ERYTHROCYTE [DISTWIDTH] IN BLOOD BY AUTOMATED COUNT: 14.4 % (ref 11.5–14.5)
FERRITIN SERPL-MCNC: 370 NG/ML (ref 8–150)
FLUAV RNA RESP QL NAA+PROBE: NOT DETECTED
FLUBV RNA RESP QL NAA+PROBE: NOT DETECTED
GLUCOSE SERPL-MCNC: 128 MG/DL (ref 74–99)
GLUCOSE SERPL-MCNC: 128 MG/DL (ref 74–99)
HCT VFR BLD AUTO: 39.5 % (ref 36–46)
HGB BLD-MCNC: 12.5 G/DL (ref 12–16)
IMM GRANULOCYTES # BLD AUTO: 0.01 X10*3/UL (ref 0–0.7)
IMM GRANULOCYTES NFR BLD AUTO: 0.2 % (ref 0–0.9)
IRON SATN MFR SERPL: 17 % (ref 25–45)
IRON SERPL-MCNC: 48 UG/DL (ref 35–150)
LYMPHOCYTES # BLD AUTO: 2.28 X10*3/UL (ref 1.2–4.8)
LYMPHOCYTES NFR BLD AUTO: 37.1 %
MAGNESIUM SERPL-MCNC: 1.98 MG/DL (ref 1.6–2.4)
MCH RBC QN AUTO: 25.9 PG (ref 26–34)
MCHC RBC AUTO-ENTMCNC: 31.6 G/DL (ref 32–36)
MCV RBC AUTO: 82 FL (ref 80–100)
MONOCYTES # BLD AUTO: 0.45 X10*3/UL (ref 0.1–1)
MONOCYTES NFR BLD AUTO: 7.3 %
NEUTROPHILS # BLD AUTO: 3.23 X10*3/UL (ref 1.2–7.7)
NEUTROPHILS NFR BLD AUTO: 52.4 %
PLATELET # BLD AUTO: 315 X10*3/UL (ref 150–450)
POTASSIUM SERPL-SCNC: 3.8 MMOL/L (ref 3.5–5.3)
POTASSIUM SERPL-SCNC: 3.8 MMOL/L (ref 3.5–5.3)
PROT SERPL-MCNC: 7.5 G/DL (ref 6.4–8.2)
PROT SERPL-MCNC: 7.5 G/DL (ref 6.4–8.2)
RBC # BLD AUTO: 4.82 X10*6/UL (ref 4–5.2)
SARS-COV-2 RNA RESP QL NAA+PROBE: NOT DETECTED
SODIUM SERPL-SCNC: 138 MMOL/L (ref 136–145)
SODIUM SERPL-SCNC: 139 MMOL/L (ref 136–145)
TIBC SERPL-MCNC: 286 UG/DL (ref 240–445)
UIBC SERPL-MCNC: 238 UG/DL (ref 110–370)
WBC # BLD AUTO: 6.2 X10*3/UL (ref 4.4–11.3)

## 2025-03-19 PROCEDURE — 87636 SARSCOV2 & INF A&B AMP PRB: CPT | Performed by: NURSE PRACTITIONER

## 2025-03-19 PROCEDURE — 84134 ASSAY OF PREALBUMIN: CPT | Mod: GEALAB | Performed by: SURGERY

## 2025-03-19 PROCEDURE — 80053 COMPREHEN METABOLIC PANEL: CPT

## 2025-03-19 PROCEDURE — 71046 X-RAY EXAM CHEST 2 VIEWS: CPT

## 2025-03-19 PROCEDURE — 85379 FIBRIN DEGRADATION QUANT: CPT

## 2025-03-19 PROCEDURE — 71046 X-RAY EXAM CHEST 2 VIEWS: CPT | Performed by: STUDENT IN AN ORGANIZED HEALTH CARE EDUCATION/TRAINING PROGRAM

## 2025-03-19 PROCEDURE — 2550000001 HC RX 255 CONTRASTS: Performed by: NURSE PRACTITIONER

## 2025-03-19 PROCEDURE — 83036 HEMOGLOBIN GLYCOSYLATED A1C: CPT | Mod: GEALAB | Performed by: SURGERY

## 2025-03-19 PROCEDURE — 99215 OFFICE O/P EST HI 40 MIN: CPT | Mod: GC | Performed by: INTERNAL MEDICINE

## 2025-03-19 PROCEDURE — 82607 VITAMIN B-12: CPT | Mod: GEALAB

## 2025-03-19 PROCEDURE — 93005 ELECTROCARDIOGRAM TRACING: CPT

## 2025-03-19 PROCEDURE — 85025 COMPLETE CBC W/AUTO DIFF WBC: CPT

## 2025-03-19 PROCEDURE — 83880 ASSAY OF NATRIURETIC PEPTIDE: CPT | Performed by: NURSE PRACTITIONER

## 2025-03-19 PROCEDURE — 71275 CT ANGIOGRAPHY CHEST: CPT

## 2025-03-19 PROCEDURE — 99214 OFFICE O/P EST MOD 30 MIN: CPT

## 2025-03-19 PROCEDURE — 71275 CT ANGIOGRAPHY CHEST: CPT | Performed by: STUDENT IN AN ORGANIZED HEALTH CARE EDUCATION/TRAINING PROGRAM

## 2025-03-19 PROCEDURE — 84484 ASSAY OF TROPONIN QUANT: CPT | Performed by: NURSE PRACTITIONER

## 2025-03-19 PROCEDURE — 1036F TOBACCO NON-USER: CPT | Performed by: INTERNAL MEDICINE

## 2025-03-19 PROCEDURE — 1036F TOBACCO NON-USER: CPT

## 2025-03-19 PROCEDURE — 82728 ASSAY OF FERRITIN: CPT

## 2025-03-19 PROCEDURE — 83735 ASSAY OF MAGNESIUM: CPT | Performed by: NURSE PRACTITIONER

## 2025-03-19 PROCEDURE — 36415 COLL VENOUS BLD VENIPUNCTURE: CPT

## 2025-03-19 PROCEDURE — 83540 ASSAY OF IRON: CPT

## 2025-03-19 PROCEDURE — 93970 EXTREMITY STUDY: CPT

## 2025-03-19 PROCEDURE — 84075 ASSAY ALKALINE PHOSPHATASE: CPT | Performed by: SURGERY

## 2025-03-19 PROCEDURE — 99285 EMERGENCY DEPT VISIT HI MDM: CPT | Mod: 25 | Performed by: STUDENT IN AN ORGANIZED HEALTH CARE EDUCATION/TRAINING PROGRAM

## 2025-03-19 PROCEDURE — 99215 OFFICE O/P EST HI 40 MIN: CPT | Performed by: INTERNAL MEDICINE

## 2025-03-19 RX ADMIN — IOHEXOL 75 ML: 350 INJECTION, SOLUTION INTRAVENOUS at 19:05

## 2025-03-19 SDOH — ECONOMIC STABILITY: FOOD INSECURITY: WITHIN THE PAST 12 MONTHS, THE FOOD YOU BOUGHT JUST DIDN'T LAST AND YOU DIDN'T HAVE MONEY TO GET MORE.: NEVER TRUE

## 2025-03-19 SDOH — ECONOMIC STABILITY: FOOD INSECURITY: WITHIN THE PAST 12 MONTHS, YOU WORRIED THAT YOUR FOOD WOULD RUN OUT BEFORE YOU GOT MONEY TO BUY MORE.: NEVER TRUE

## 2025-03-19 ASSESSMENT — PAIN SCALES - GENERAL
PAINLEVEL_OUTOF10: 0 - NO PAIN
PAINLEVEL_OUTOF10: 8

## 2025-03-19 ASSESSMENT — COLUMBIA-SUICIDE SEVERITY RATING SCALE - C-SSRS
2. HAVE YOU ACTUALLY HAD ANY THOUGHTS OF KILLING YOURSELF?: NO
1. IN THE PAST MONTH, HAVE YOU WISHED YOU WERE DEAD OR WISHED YOU COULD GO TO SLEEP AND NOT WAKE UP?: NO
6. HAVE YOU EVER DONE ANYTHING, STARTED TO DO ANYTHING, OR PREPARED TO DO ANYTHING TO END YOUR LIFE?: NO
1. IN THE PAST MONTH, HAVE YOU WISHED YOU WERE DEAD OR WISHED YOU COULD GO TO SLEEP AND NOT WAKE UP?: NO
2. HAVE YOU ACTUALLY HAD ANY THOUGHTS OF KILLING YOURSELF?: NO
6. HAVE YOU EVER DONE ANYTHING, STARTED TO DO ANYTHING, OR PREPARED TO DO ANYTHING TO END YOUR LIFE?: NO

## 2025-03-19 ASSESSMENT — LIFESTYLE VARIABLES: HOW OFTEN DO YOU HAVE SIX OR MORE DRINKS ON ONE OCCASION: LESS THAN MONTHLY

## 2025-03-19 ASSESSMENT — ENCOUNTER SYMPTOMS
LOSS OF SENSATION IN FEET: 0
DEPRESSION: 0
OCCASIONAL FEELINGS OF UNSTEADINESS: 0

## 2025-03-19 ASSESSMENT — PAIN - FUNCTIONAL ASSESSMENT: PAIN_FUNCTIONAL_ASSESSMENT: 0-10

## 2025-03-19 NOTE — PROGRESS NOTES
[facility]    PATIENT VISIT INFORMATION    Visit Type: Follow Up Visit     Referring Provider: Emergency Room  Reason for referral: Elevated D-dimer  Primary Practice Provider: Joy Castle MD    CANCER/HEMATOLGOY HISTORY    Cancer History:        Breast         AJCC Edition: 8th (AJCC), Diagnosis Date: 27-Jul-2021, IA, pT1b pN0 cM0 G2  Treatment Synopsis:   64-year-old postmenopausal AA female with rightt-sided invasive lobular carcinoma, stage IA (T1b N0 M0). The patient's breast cancer was diagnosed on July 27, 2021,  and is estrogen receptor positive at >95%, progesterone receptor positive at >95%, and HER-2/xiao negative, grade 2 with MammaPrint Index = +0.120; translating to Low Risk Luminal A with a 10-year risk of recurrence of 10%. Details of her history are as  follows:       07/21/2021: Patient underwent a bilateral MRI for screening due to dense breast. This showed a mass measuring 0.8 x 0.8 x 0.7 cm mass. No axillary  or internal mammary LN appreciated   07/27/2021: Second look targeted US of the right breast and axilla-revealed 5 normal looking LNs.    07/27/2021: Patient underwent a US-guided biopsy of the right breast at 12:00, 4 cm from the nipple. Pathology was consistent with results as above   08/19/2021: Patient underwent a right partial mastectomy with SLNB. Pathology showed a 0.6cminvasive carcinoma, grade 2, with 0/2 SLNs involved. Margins were negative   2/23/2022: Completed Radiation   03/03/2022: Started Arimidex  1/17/2024 patient switched from anastrozole to letrozole and is tolerating better.  Now on exemestane and will reach out to oncologist to switch back to Letrozole.    Mammogram recommended 1 year follow up.     Hematology history:  Patient is a pleasant 64-year-old female presents today for elevated D-dimer following emergency room visit.  CT angio chest was performed and did not see any signs of pulmonary embolism.  D-dimer elevated 966,  "CRP elevated.  Conducted a flu and COVID swab which was negative.  She followed up with cardiology 1/12/2023 and was cleared and instructed to only follow-up as needed.  They performed a CT spine which revealed degenerative joint disease and foraminal stenosis at C5-7.  No abnormal intracranial abnormality.  CT of head performed with no acute concerns or hemorrhage.     Diagnosis of hormone positive breast cancer diagnosed July 21, 2021.  Treatment synopsis as above. Bone survey did not show lytic lesions. She had a recent EGD and colonoscopy 10/10/2023 due to abdominal- right upper quadrant.  Biopsies for celiac disease pending.  Internal hemorrhoids identified no other abnormal findings.  On 09/26/2023 CT abdomen pelvis shows hypodense liver lesion.  The stability since 2020 indicating benign etiology.  Ultrasound of gallbladder same date shows diffusely increased hepatic echogenicity may be seen with hepatic steatosis or hepatocellular disease, likely contributing to anemia. X-ray completed October 19, 2023 that showed no acute cardiopulmonary process.  She reports they were ruling out pneumonia.  Bilateral mammogram pending, previous 12/2022 recommended 1 year screening no malignancy identified.     Previous lower back pain gone. Renal ultrasound 2/6/2024 normal.  An MRI of liver 2/6/2024 shows likely hemangioma, fatty liver and does not note any lymphadenopathy or suspicious masses.    She was ordered a PET scan by her rheumatologist though she reports advised by the breast cancer clinic that she should avoid additional contrast agent testing.  At this time a PET scan does not seem necessary.     1/24/2024 Left breast biopsy on her left breast following dermatology. Shows inflammatory cells present.  Noted breast drainage and not noted since.    HISTORY OF PRESENT ILLNESS     ID Statement:  Calvin Monroe is a 64 year old female       Chief Complaint: \"Very tiered, not feeling well today, Short of " "breathe with exertion\"    Interval History:   She reports dizziness constant and ongoing mor recently. Hydrating properly. Legs in pain and weak. US r/o LE DVT performed today. Now SOB with exertion. Legs feel heavy and she is having a hard time getting around without difficulty breathing.  She reported DE and leg pain to her primary physician Dr. Joy Castle this morning.    She reports still experiencing headaches though most of her other symptoms have resolved. Appetite same. Trying to lose weight.     Reports night sweats occasionally that have decreased after starting Effexor. Followed by Dr. [facility] for Breast cancer Surveillance.  She is struggling with aromatase inhibitors.  She will reach out to her breast clinic for guidance.    Patient denies cough, visual or hearing changes, oral sores or lesions of the skin, problem swallowing, pain in the chest, current urinary issues, diarrhea, constipation, unusual vaginal discharge or dryness, any new lymphadenopathy or changes in her chest/breast area.  She reports her breast cancer was discovered during a routine mammogram screening.  She denies any overt bleeding or bruising.    PAST/CURRENT HISTORY      Medical history:  -Edema in lower extremities  -Irritable bowel syndrome  -Multiple sclerosis  -Right side breast cancer hormone positive on anastrozole  -Perimenopausal  -Hematuria  -Partial meniscectomy  -Umbilical hernia repair  -Elevated ferritin after iron infusions.  -Elevated D-dimer VTE and none VTE     Social history:  -She lives with  and has 3 healthy children and multiple grandchildren  -Homemaker  -She never smoked  -No alcoholic   -No illicit drug use     Family history:  -Maternal grandmother stomach and liver cancer  -Paternal grandmother: Breast cancer  -Paternal uncle colon cancer  -No other known hematologic, genetic or cancer diagnosis in first-degree relatives    REVIEW OF SYSTEMS      Review of Systems:   A review of systems " has been completed and are negative for complaints except what is stated in the assessment, HPI, IH, and/or past medical history.        ALLERGIES AND MEDICATIONS        Allergies and Intolerances:  Allergies   Allergen Reactions    Adhesive Tape-Silicones Itching    Latex Hives    Meperidine Other     GI upset     Morphine Other     GI upset      Outpatient Medication Profile:  Current Outpatient Medications   Medication Instructions    diclofenac sodium (VOLTAREN) 4 g, Topical, 4 times daily    ergocalciferol (VITAMIN D-2) 50,000 Units, oral, Weekly    exemestane (AROMASIN) 25 mg, oral, Daily, Take after a meal.  Try to take at the same time each day.    Trulicity 1.5 mg, subcutaneous, Once Weekly    venlafaxine XR (EFFEXOR XR) 37.5 mg, oral, Daily, Do not crush or chew.      Immunization History   Administered Date(s) Administered    Pneumococcal conjugate vaccine, 20-valent (PREVNAR 20) 07/21/2022        PHYSICAL EXAM        Performance:   ECOG Performance Status: 1     Grade ECOG performance status   0 Fully active, able to carry on all pre-disease performance without restriction   1 Restricted in physically strenuous activity but ambulatory and able to carry out work of a light or sedentary nature, e.g., light housework, office work   2 Ambulatory and capable of all selfcare but unable to carry out any work activities; Up and about more than 50% of waking hours   3 Capable of only limited selfcare, confined to bed or chair more than 50% of waking hours   4 Completely disabled; Cannot carry out any selfcare; Totally confined to bed or chair   5 Dead        Vitals and Measurements:       11/4/2024    11:01 AM 11/7/2024     1:49 PM 1/3/2025     2:50 PM 1/17/2025    11:57 AM 2/4/2025     3:08 PM 2/24/2025     3:13 PM 3/19/2025     2:16 PM   Vitals   Systolic 137 123 127 127 141 118    Diastolic 86 85 84 85 84 74    BP Location Left arm  Right arm Left arm Right arm  Left arm   Heart Rate 75 89 91 80 78     Temp 36  "°C (96.8 °F) 36.3 °C (97.3 °F) 36.5 °C (97.7 °F) 36.5 °C (97.7 °F) 37.1 °C (98.8 °F)     Resp 16      17   Height 1.662 m (5' 5.43\")  1.651 m (5' 5\")  1.651 m (5' 5\")  1.651 m (5' 5\")    Weight (lb) 272.05 276 275.58 272.6 277 272 269.29   BMI 44.67 kg/m2 45.93 kg/m2 45.86 kg/m2 45.36 kg/m2 46.1 kg/m2 45.26 kg/m2 44.81 kg/m2   BSA (m2) 2.38 m2 2.39 m2 2.39 m2 2.38 m2 2.4 m2 2.38 m2 2.37 m2   Visit Report Report Report Report Report Report Report Report    Report       Significant value      Physical Exam:      Constitutional: Patient is awake/alert/oriented  x3, no distress, Nourished, hydrated, alert and cooperative, she is overweight   Eyes: PERRL, EOMI, clear sclera   ENMT: mucous membranes moist, no oral lesions    Head/Neck: Neck supple, no apparent injury   Respiratory/Thorax: Patent airways, CTAB, diminished in the bases, chest symmetry, normal inspiratory and expiratory effort    Cardiovascular: Regular, rate and rhythm, normal S1 and S2, no carotid bruit   Gastrointestinal: Non-distended, soft, no masses or organomegaly appreciated d/t body habitus    Genitourinary: deferred   Musculoskeletal: No pain with lumbar palpation, knee tenderness on right, normal strength for baseline    Extremities: Mild nonpitting dependent ankle edema bilateral, normal strength   Neurological: alert and oriented x3, intact senses,  motor, response and reflexes, normal strength, cranial nerves normal   Breast:   Lymphatic: No significant lymphadenopathy   Psychological: Appropriate mood and behavior   Skin: dry, no lesions, no rashes         RESULTS/DATA     Labs:  Lab Results   Component Value Date    WBC 6.2 03/19/2025    NEUTROABS 3.23 03/19/2025    IGABSOL 0.01 03/19/2025    LYMPHSABS 2.28 03/19/2025    MONOSABS 0.45 03/19/2025    EOSABS 0.17 03/19/2025    BASOSABS 0.01 03/19/2025    RBC 4.82 03/19/2025    MCV 82 03/19/2025    MCHC 31.6 (L) 03/19/2025    HGB 12.5 03/19/2025    HCT 39.5 03/19/2025     03/19/2025 "     Lab Results   Component Value Date    RETICCTPCT 1.7 02/01/2024      Lab Results   Component Value Date    CREATININE 0.79 03/19/2025    BUN 14 03/19/2025    EGFR 84 03/19/2025     03/19/2025    K 3.8 03/19/2025     03/19/2025    CO2 28 03/19/2025      Lab Results   Component Value Date    ALT 7 03/19/2025    AST 11 03/19/2025    ALKPHOS 73 03/19/2025    BILITOT 0.5 03/19/2025      Lab Results   Component Value Date    TSH 0.48 08/01/2022     Lab Results   Component Value Date    TSH 0.48 08/01/2022     Lab Results   Component Value Date    IRON 48 03/19/2025    TIBC 286 03/19/2025    FERRITIN 370 (H) 03/19/2025      Lab Results   Component Value Date    LQMBMEZO96 514 02/17/2025        Lab Results   Component Value Date    WARD Negative 10/26/2023    RF <10 02/01/2024    SEDRATE 75 (H) 02/01/2024      Lab Results   Component Value Date    CRP 4.75 (H) 04/10/2024        Lab Results   Component Value Date     09/09/2024     Lab Results   Component Value Date    HAPTOGLOBIN 255 (H) 02/01/2024     Lab Results   Component Value Date    SPEP Normal. 10/26/2023     Lab Results   Component Value Date    IGG 1,450 10/26/2023     10/26/2023     (H) 10/26/2023        Lab Results   Component Value Date    DDIMERVTE 1,133 (H) 03/19/2025      Lab Results   Component Value Date    WBC 6.2 03/19/2025    NEUTROABS 3.23 03/19/2025    IGABSOL 0.01 03/19/2025    LYMPHSABS 2.28 03/19/2025    MONOSABS 0.45 03/19/2025    EOSABS 0.17 03/19/2025    BASOSABS 0.01 03/19/2025    RBC 4.82 03/19/2025    MCV 82 03/19/2025    MCHC 31.6 (L) 03/19/2025    HGB 12.5 03/19/2025    HCT 39.5 03/19/2025     03/19/2025     Lab Results   Component Value Date    RETICCTPCT 1.7 02/01/2024      Lab Results   Component Value Date    CREATININE 0.75 02/17/2025    BUN 15 02/17/2025    EGFR 90 02/17/2025     02/17/2025    K 4.3 02/17/2025     02/17/2025    CO2 27 02/17/2025      Lab Results   Component Value  Date    ALT 8 02/17/2025    AST 8 (L) 02/17/2025    ALKPHOS 83 02/17/2025    BILITOT 0.3 02/17/2025      Lab Results   Component Value Date    TSH 0.48 08/01/2022     Lab Results   Component Value Date    TSH 0.48 08/01/2022     Lab Results   Component Value Date    IRON 42 02/17/2025    TIBC 291 02/17/2025    FERRITIN 395 (H) 02/17/2025      Lab Results   Component Value Date    CXLLIOPA71 514 02/17/2025     Lab Results   Component Value Date    WARD Negative 10/26/2023    RF <10 02/01/2024    SEDRATE 75 (H) 02/01/2024      Lab Results   Component Value Date    CRP 4.75 (H) 04/10/2024        Lab Results   Component Value Date     09/09/2024     Lab Results   Component Value Date    HAPTOGLOBIN 255 (H) 02/01/2024     Lab Results   Component Value Date    SPEP Normal. 10/26/2023     Lab Results   Component Value Date    IGG 1,450 10/26/2023     10/26/2023     (H) 10/26/2023       ASSESSMENT/PLAN     Assessment and Plan:     #1.  Elevated D-dimer none DVT  Ms. Avery Huitron is a pleasant 63-year-old female that presents for follow-up from being in the emergency room with an elevated D-dimer.  She had previously seen for iron deficiency and was infused x 2 Feraheme in November 2023. Tolerated well and with no side effects.   5/16/2024: Last D-dimer in January 2024 was 797, none VTE quantitative.  We will reassess this at next visit. Coags normal.   9/18/2024 remains slightly elevated may be related to inflammatory condition. Multiple musculoskeletal conditions inflamed. Received steroid injection in right knee recently.   10/30/2023 Bone survey shows no metastatic osseous lesion is identified. Multiple degenerative changes and osteoarthritis in both knees. Disc height loss.     3/19/2025 D-dimer non-VTE reassess due to complaint of shortness of breath with exertion and overall unwell feeling.  Level is 1132 previously 726, 797 within the last year.  VTE exclusion same value 1133, previously 966.   Patient presents today shortness of breath with exertion, heavy legs with pain.  Primary care ordered vascular ultrasound lower extremity bilateral rule out DVT and advised to go to the emergency room if experiencing worsening shortness of breath.  There is no DVT identified in either extremity and shows normal phasic flow.  Advised patient to go to the emergency room to rule out pulmonary embolism due to recent complaints.  Patient is in agreement and will be going to [facility].  Attempt to call report unable to reach ER.  ER provider can reach out to me for any questions.    #2.  Iron deficiency anemia versus chronic disease anemia with elevated ferritin   At initial visit hemoglobin 10.8, ferritin 108, iron TSAT 10%, ESR and CRP elevated.  MCV 81, previously microcytic.  GFR above 90.  Retic percent calculated 1.4.  Infusion received 10/31 and 11/07.  Discussed the potential causes of iron deficiency anemia.  Her inflammatory markers are elevated.  Full anemia work-up is negative for concerning markers.  Hemoglobin today is 11.6 remained stable.  Her SPEP is normal, kappa light chains elevated likely inflammatory, immunoglobulins IgA slightly elevated at 409, WARD negative.   Due to patient's complaint of bone pain in lower back, skeletal survey ordered and negative for lytic or concerning myeloma lesions. Notable degenerative changes. Referral to Rheumatology for elevated ESR, CRP and musculoskeletal pain.  Rheumatology ordered a PET/CT.  She has not had the procedure done due to being advised by breast cancer clinic that she has had a lot of imaging and should hold off on contrast.  I agree with this.  PET/CT is not warranted at this time.  We will continue to monitor immunoglobulins and inflammatory markers along with anemia labs.  5/16/2024: She had previous iron deficiency anemia that is now corrected.  Has elevated ferritin.  We are monitoring her levels.  Reassess at next visit.  There are no other  abnormalities on her labs.     9/18/2024 TIBC UIBC in normal range.  Slight TSAT and iron stores are declining with elevated ferritin 433.  May be acute phase reactant. Negative hemachromatosis. Flow Cytometry order per patient request. Do not take oral iron.     3/19/2025 ferritin decreased at 370 previously as high as 476.  Unsure if this is an acute phase reactant.  Bone marrow biopsy completed October 28, 2024 showing HYPERCELLULAR BONE MARROW (70-80%) WITH NORMAL MATURING TRILINEAGE HEMATOPOIESIS, SEE NOTE. --ADEQUATE IRON STORES. No actionable BM abnormality.  10/21/2024 PET/CT negative.  No iron deficiency at this time.    #3.  Infected biopsy on breast   Patient noted breast drainage and itchiness once recently. Advised with this occurs again to reach out to derm and see if culture is needed. 1/24/2024 Left breast biopsy on her left breast following dermatology. Shows inflammatory cells present and seborrhoic keratosis.  On 8/6/2024 Mammogram showed a few dilated ducts are visualized without evidence of intraductal masses or intraductal flow.     3/19/2025 No complain of any breast concerns.  She is looking into having a breast reduction surgery.    #4. Fatty liver  Referral to hepatology. Advised healthy diet and exercise. Weight loss, she met with nutritionist and is on Trulicity. Hep C negative 12/7/2023.  Patient followed hepatology November 2024 and was advised dietary lifestyle modification and weight loss.  She was recommended to follow-up with pcp or hepatology annually.    **Please follow with specialties as scheduled for other health needs and routine screenings.**    Follow-up:     RTC:  -6 months with labs      Medications:  -None prescribed    Imaging:  -NA     Referrals:  -Emergency room to rule out pulmonary embolism due to shortness of breath and elevated D-dimer       Patient Instructions summary:  Discussed plan of care.  Patient states understanding and agreement.  Answered all her  questions.  She is in agreement for referral.  She will call with any questions or concerns.    Thank you for allowing me to care for you today.     Sincerely,  Aarti Mcdonald, APRN-CNP     Hematology/Oncology Clinical Nurse Practitioner     [facility]   [address]  [place] [address] [place] [id number]  Office #: [phone]    DISCLAIMER:   In preparing for this visit and writing this note, I reviewed all the previous electronic medical records (testing, labs, imaging, and other procedures, provider notes, and medical charts) of the patient available in the physician portal pertinent to patient care. Significant findings which helped in decision making are recorded in this chart.    This document may have been written by voice recognition software.  There may be some incorrect wording, spelling and/or spelling errors or punctuation errors that were not corrected prior to committing the note to the medical record. Please request clarification if there is documentation error or clarification is needed.   Time based billing: Please see documentation within this specific encounter.

## 2025-03-19 NOTE — PROGRESS NOTES
"Subjective   Patient ID: Calvin Varela- \"Ashlyn" is a 64 y.o. female  w/ right-sided invasive lobular carcinoma, stage IA (T1b N0 M0) pmhx of who presents for follow up.     HPI  Patient reported that she was recently switched to Exemestane she has been experiencing increasing weakness, lightlessness/dizziness. She reports hydrating appropriately but, reported that of these symptoms all started after taking the medication. She has remained complaint with all of her other medications. Patient also endorsed bialteral leg pain for the past few days associated with DE. Patient otherwise denied chest pain, N/V,  or any other symptoms.     Review of Systems   All other systems reviewed and are negative.    Social Hx:  Tobacco- Denied  Alcohol- Wine glass every few weeks  Recreational drug- Denied    Fam Hx:  Paternal Grandmother-GI Cancer  Paternal Aunt- Breast Cancer  Father-DM    Objective   Physical Exam  Vitals reviewed.   Constitutional:       Appearance: Normal appearance.   HENT:      Nose: Nose normal.   Eyes:      Extraocular Movements: Extraocular movements intact.   Cardiovascular:      Rate and Rhythm: Normal rate and regular rhythm.   Pulmonary:      Effort: Pulmonary effort is normal.      Breath sounds: Normal breath sounds.   Abdominal:      General: Abdomen is flat. Bowel sounds are normal.      Palpations: Abdomen is soft.   Musculoskeletal:         General: Normal range of motion.   Skin:     General: Skin is warm.   Neurological:      Mental Status: She is alert and oriented to person, place, and time.   Psychiatric:         Mood and Affect: Mood normal.       Assessment/Plan     Bilateral leg pain-Bl DVT U/S pending coordinating with her afternoon appointment at [facility] to have this arranged, instructed to present to ED if experiencing worsening SOB   DM- C/w Trulicity  Right invasive lobular carcinoma- Discuss exemestane w/Oncology   Knee pain (OA vs MCL Olga)- C/w Ortho follow up "   Chronic microcytic-normocytic anemia- C/w follow up with Heme  Fatty Liver-Repeat LFTs with next labs    HM:  Colon-Repeat in 2033  Mammo- Repeat in 1/2026  Pap Smear- HPV high risk postive but, Pap wnl, getting bx on 4/21 with OBGYN  DEXA Scan- Will order at 66    RTO in 3 months     Ramon Tobias MD 03/19/25 9:20 AM     I was present for all pertinent aspects of the history and physical exam as well the execution of the above plan. Greater than 40 minutes were spent on the care and coordination of care of the patient.

## 2025-03-19 NOTE — ED TRIAGE NOTES
Patient had blood work done today and her d-dimer came back very elevated. She reports SOB when walking. Denies CP. Patient does endorse some lightheadedness.

## 2025-03-20 ENCOUNTER — HOSPITAL ENCOUNTER (EMERGENCY)
Facility: HOSPITAL | Age: 65
Discharge: HOME | End: 2025-03-20
Attending: STUDENT IN AN ORGANIZED HEALTH CARE EDUCATION/TRAINING PROGRAM
Payer: COMMERCIAL

## 2025-03-20 VITALS
HEART RATE: 84 BPM | SYSTOLIC BLOOD PRESSURE: 131 MMHG | WEIGHT: 267 LBS | BODY MASS INDEX: 44.48 KG/M2 | OXYGEN SATURATION: 97 % | TEMPERATURE: 98.6 F | RESPIRATION RATE: 16 BRPM | HEIGHT: 65 IN | DIASTOLIC BLOOD PRESSURE: 101 MMHG

## 2025-03-20 DIAGNOSIS — R07.9 CHEST PAIN, UNSPECIFIED TYPE: ICD-10-CM

## 2025-03-20 DIAGNOSIS — R06.02 SHORTNESS OF BREATH: Primary | ICD-10-CM

## 2025-03-20 LAB
ATRIAL RATE: 90 BPM
EST. AVERAGE GLUCOSE BLD GHB EST-MCNC: 111 MG/DL
HBA1C MFR BLD: 5.5 %
P AXIS: 43 DEGREES
P OFFSET: 182 MS
P ONSET: 122 MS
PR INTERVAL: 180 MS
PREALB SERPL-MCNC: 21.9 MG/DL (ref 18–40)
Q ONSET: 212 MS
QRS COUNT: 14 BEATS
QRS DURATION: 78 MS
QT INTERVAL: 364 MS
QTC CALCULATION(BAZETT): 445 MS
QTC FREDERICIA: 416 MS
R AXIS: -55 DEGREES
T AXIS: 39 DEGREES
T OFFSET: 394 MS
VENTRICULAR RATE: 90 BPM
VIT B12 SERPL-MCNC: 539 PG/ML (ref 211–911)

## 2025-03-20 NOTE — ED PROVIDER NOTES
HPI   Chief Complaint   Patient presents with    Shortness of Breath       HPI        Patient History   Past Medical History:   Diagnosis Date    Anxiety disorder, unspecified     Anxiety    Arthritis     Bilious vomiting 04/26/2016    Bilious vomiting with nausea    Breast CA (Multi)     Headache, unspecified 02/03/2017    Headache    Irritable bowel syndrome with diarrhea 04/07/2016    Irritable bowel syndrome with diarrhea    Mammographic calcification found on diagnostic imaging of breast 06/01/2015    Breast calcification, right    ARIAN (obstructive sleep apnea)     Overactive bladder     OAB (overactive bladder)    Personal history of diseases of the blood and blood-forming organs and certain disorders involving the immune mechanism     History of anemia    Personal history of diseases of the skin and subcutaneous tissue 07/27/2018    History of cyst of breast    Personal history of irradiation     Personal history of other diseases of the female genital tract 08/01/2017    History of breast pain    Personal history of other mental and behavioral disorders     History of depression    Personal history of other specified conditions     History of lump of left breast     Past Surgical History:   Procedure Laterality Date    BREAST LUMPECTOMY Right 08/2021    FRACTURE SURGERY      HERNIA REPAIR  10/17/2014    Hernia Repair    MR HEAD ANGIO WO IV CONTRAST  06/12/2021    MR HEAD ANGIO WO IV CONTRAST 6/12/2021 [facility]    MR NECK ANGIO WO IV CONTRAST  06/12/2021    MR NECK ANGIO WO IV CONTRAST 6/12/2021 [facility]    OTHER SURGICAL HISTORY  10/17/2014    Laparoscopic Excision Of Ectopic Pregnancy And Salpingectomy    OTHER SURGICAL HISTORY  08/03/2021    Knee arthroscopy     Family History   Problem Relation Name Age of Onset    Other (murder) Mother      Alzheimer's disease Father      Diabetes Father      Lung cancer Father      Asthma Brother      Stomach cancer Maternal Grandmother       Breast cancer Paternal Grandmother      Other (cardiac disorder) Other      Stroke Other      Ovarian cancer Other      Breast cancer Other       Social History     Tobacco Use    Smoking status: Never    Smokeless tobacco: Never   Vaping Use    Vaping status: Never Used   Substance Use Topics    Alcohol use: Yes     Comment: occasional glass of wine    Drug use: Never       Physical Exam   ED Triage Vitals [03/19/25 1834]   Temperature Heart Rate Respirations BP   37 °C (98.6 °F) 94 18 (!) 140/102      Pulse Ox Temp Source Heart Rate Source Patient Position   97 % Oral -- Sitting      BP Location FiO2 (%)     Left arm --       Physical Exam      ED Course & MDM                  No data recorded     [place] Coma Scale Score: 15 (03/19/25 1836 : Linda Morales RN)                           Medical Decision Making      Procedure  Procedures   present.      Left lower leg: Edema present.   Skin:     General: Skin is warm and dry.   Neurological:      General: No focal deficit present.      Mental Status: She is alert and oriented to person, place, and time.           ED Course & MDM   Diagnoses as of 04/04/25 1926   Shortness of breath   Chest pain, unspecified type                 No data recorded     [place] Coma Scale Score: 15 (03/19/25 1836 : Linda Morales RN)                     EKG, interpreted by me: sinus rhythm, rate 90. Normal axis. No acute ST elevation or depression. . No evidence of acute STEMI at this time.    Medical Decision Making  65 yo F with history of aortic dilatation, breast cancer on exemestane, iron deficiency anemia, T2DM presents with abnormal outpatient labs and reported dyspnea on exertion. Patient states symptoms began a few weeks ago. She had outpatient lab workup done that showed an elevated D dimer and was instructed to come to the emergency department to rule out pulmonary embolism. She has no history of DVT or PE. Patient had outpatient blood work today that showed no significant electrolyte derangement, baseline renal function, no significant leukocytosis or anemia. D dimer elevated at 1133. On arrival, patient nontoxic appearing. Vitals stable. No increased work of breathing at rest, lungs CTAB. She does have bilateral lower extremity edema, 1+, equal calf circumference bilaterally with negative Josh's sign. CT PE ordered per abnormal outpatient lab and risk factors for PE including cancer and obesity. CT showing no evidence of pulmonary embolism, and no noted pneumonia, pleural effusion, or pulmonary edema. CT does show possible pulmonary arterial hypertension and ectasia to the ascending aorta. Covid and flu negative. Troponin negative and EKG nonischemic. BNP within normal limits. Patient ambulating in the emergency department with stable SpO2 at % on RA. No noticeable dyspnea with  ambulation. Patient given an order for an outpatient echocardiogram and stress test, given her risk factors, although she denies chest pain at this time, clinically worth repeating as it has been approximately a year since her last echo and three years since her last stress test. Discussed the need for follow up with primary care provider in the next few days for re-evaluation. Discussed return precautions at length. Patient voiced understanding and agreement with plan.      Procedure  Procedures     Franny Fuller MD  04/04/25 1939       Franny Fuller MD  04/04/25 1941

## 2025-03-28 DIAGNOSIS — Z85.3 PERSONAL HISTORY OF BREAST CANCER: ICD-10-CM

## 2025-03-28 RX ORDER — EXEMESTANE 25 MG/1
25 TABLET ORAL DAILY
Qty: 30 TABLET | Refills: 1 | Status: SHIPPED | OUTPATIENT
Start: 2025-03-28

## 2025-04-04 ENCOUNTER — NURSE TRIAGE (OUTPATIENT)
Dept: ADMISSION | Facility: HOSPITAL | Age: 65
End: 2025-04-04
Payer: MEDICARE

## 2025-04-04 NOTE — TELEPHONE ENCOUNTER
"Patient was trying Exemestane and on this medication her legs felt heavy and she was very fatigued. She has been off this medication now for three weeks and is feeling \"so much better.\" She would like to go back on the letrozole if she has to resume any medications and would like to know what she should do going forward?  "

## 2025-04-07 NOTE — TELEPHONE ENCOUNTER
Patient made aware per Kay that she has three more years of endocrine treatment and she can go back on the letrozole. She must call the office though if she has any problems at all while on the letrozole. Verbalized understanding and agreed to plan of care.

## 2025-04-09 ENCOUNTER — TELEPHONE (OUTPATIENT)
Dept: HEMATOLOGY/ONCOLOGY | Facility: CLINIC | Age: 65
End: 2025-04-09
Payer: MEDICARE

## 2025-04-09 ENCOUNTER — TELEPHONE (OUTPATIENT)
Dept: OBSTETRICS AND GYNECOLOGY | Facility: CLINIC | Age: 65
End: 2025-04-09
Payer: MEDICARE

## 2025-04-09 NOTE — TELEPHONE ENCOUNTER
PATIENT STATED,SHE WILL CALL BACK WITH UPDATED INFORMATION REGARDING HER [employer] INSURANCE, SHE STATED HER  IS CHECKING WITH THE INSURANCE COMPANY.    THE PATIENT WILL CALL BACK PRIOR TO THE APPOINTMENT DATE TO VERIFY INSURANCE.

## 2025-04-09 NOTE — TELEPHONE ENCOUNTER
Spoke with Fouzia (Calvin). She started to have severe arthralgia and fatigue on exemestane. She has been off one month. She would like to go back to letrozole which she tolerated the best of the three aromatase inhibitors. If she does not tolerate letrozole, we can consider tamoxifen or observation. I emphasized that by taking an anti-estrogen medication can reduce the risk of her breast cancer returning, or spreading to other organs compared to women who do not take it. Fouzia verbalized understanding. I asked her to contact me in a few weeks if she is tolerating the medication or not to check in so we can communicate on how she is doing. Fouzia will do this. All of Fouzia's questions/concerns were addressed. Aneesh

## 2025-04-16 ENCOUNTER — ANCILLARY PROCEDURE (OUTPATIENT)
Dept: CARDIOLOGY | Facility: CLINIC | Age: 65
End: 2025-04-16
Payer: COMMERCIAL

## 2025-04-16 DIAGNOSIS — R07.9 CHEST PAIN, UNSPECIFIED TYPE: ICD-10-CM

## 2025-04-16 PROCEDURE — 93018 CV STRESS TEST I&R ONLY: CPT | Performed by: INTERNAL MEDICINE

## 2025-04-16 PROCEDURE — 93350 STRESS TTE ONLY: CPT | Performed by: INTERNAL MEDICINE

## 2025-04-16 PROCEDURE — 93321 DOPPLER ECHO F-UP/LMTD STD: CPT | Performed by: INTERNAL MEDICINE

## 2025-04-16 PROCEDURE — 93325 DOPPLER ECHO COLOR FLOW MAPG: CPT | Performed by: INTERNAL MEDICINE

## 2025-04-16 PROCEDURE — 93016 CV STRESS TEST SUPVJ ONLY: CPT | Performed by: INTERNAL MEDICINE

## 2025-04-16 PROCEDURE — 93325 DOPPLER ECHO COLOR FLOW MAPG: CPT

## 2025-04-16 PROCEDURE — 2500000004 HC RX 250 GENERAL PHARMACY W/ HCPCS (ALT 636 FOR OP/ED): Mod: JZ | Performed by: INTERNAL MEDICINE

## 2025-04-16 PROCEDURE — 2500000004 HC RX 250 GENERAL PHARMACY W/ HCPCS (ALT 636 FOR OP/ED): Performed by: INTERNAL MEDICINE

## 2025-04-16 RX ORDER — DOBUTAMINE HYDROCHLORIDE 100 MG/100ML
40 INJECTION INTRAVENOUS CONTINUOUS
Status: SHIPPED | OUTPATIENT
Start: 2025-04-16

## 2025-04-16 RX ORDER — ATROPINE SULFATE 0.1 MG/ML
0.25 INJECTION INTRAVENOUS
Status: SHIPPED | OUTPATIENT
Start: 2025-04-16

## 2025-04-16 RX ADMIN — PERFLUTREN 3 ML OF DILUTION: 6.52 INJECTION, SUSPENSION INTRAVENOUS at 12:05

## 2025-04-16 RX ADMIN — ATROPINE SULFATE 0.25 MG: 0.1 INJECTION, SOLUTION INTRAVENOUS at 11:50

## 2025-04-16 RX ADMIN — DOBUTAMINE HYDROCHLORIDE 40 MCG/KG/MIN: 100 INJECTION INTRAVENOUS at 11:50

## 2025-04-21 ENCOUNTER — APPOINTMENT (OUTPATIENT)
Dept: OBSTETRICS AND GYNECOLOGY | Facility: CLINIC | Age: 65
End: 2025-04-21
Payer: COMMERCIAL

## 2025-04-21 VITALS
SYSTOLIC BLOOD PRESSURE: 135 MMHG | HEIGHT: 65 IN | WEIGHT: 273 LBS | BODY MASS INDEX: 45.48 KG/M2 | DIASTOLIC BLOOD PRESSURE: 84 MMHG

## 2025-04-21 DIAGNOSIS — R87.810 PAP SMEAR OF CERVIX SHOWS HIGH RISK HPV PRESENT: Primary | ICD-10-CM

## 2025-04-21 PROCEDURE — 88305 TISSUE EXAM BY PATHOLOGIST: CPT

## 2025-04-21 PROCEDURE — 88305 TISSUE EXAM BY PATHOLOGIST: CPT | Performed by: PATHOLOGY

## 2025-04-21 PROCEDURE — 57455 BIOPSY OF CERVIX W/SCOPE: CPT | Performed by: OBSTETRICS & GYNECOLOGY

## 2025-04-21 NOTE — PROGRESS NOTES
Patient ID: Calvin Pendleton is a 64 y.o. female.    Colposcopy    Date/Time: 4/21/2025 4:00 PM    Performed by: Erwin Goodwin MD  Authorized by: Erwin Goodwin MD    Procedure location: cervix    Consent:     Patient questions answered: yes      Risks and benefits of the procedure and its alternatives discussed: yes      Procedural risks discussed:  Bleeding and possible conversion to open surgery    Consent obtained:  Written    Consent given by:  Patient  Indication:     Cervical indication(s): normal Pap smear and high-risk HPV positive    Pre-procedure:     Speculum was placed in the vagina: yes      Prep solution(s): acetic acid    Procedure:     Colposcopy details:  Colposcopy adequate. There is one small island of 1+ ACWE at 8PM with clear borders and slight mosaicism. No other vascular changes.    Cervix visibility: fully visualized      SCJ visibility: fully visualized      Lesion visualized: fully visualized      Acetowhite lesion(s): cervix      Acetowhite lesion(s) description:  See above    Cervical impression: low grade      Ferric subsulfate solution applied: no      Tampon inserted: no    Post-procedure:     Patient tolerance of procedure:  Patient tolerated the procedure well with no immediate complications

## 2025-04-29 LAB
LABORATORY COMMENT REPORT: NORMAL
PATH REPORT.FINAL DX SPEC: NORMAL
PATH REPORT.GROSS SPEC: NORMAL
PATH REPORT.RELEVANT HX SPEC: NORMAL
PATH REPORT.TOTAL CANCER: NORMAL

## 2025-05-10 DIAGNOSIS — R79.89 LOW VITAMIN D LEVEL: ICD-10-CM

## 2025-05-12 RX ORDER — ERGOCALCIFEROL 1.25 MG/1
50000 CAPSULE ORAL
Qty: 12 CAPSULE | Refills: 0 | Status: SHIPPED | OUTPATIENT
Start: 2025-05-18

## 2025-05-16 DIAGNOSIS — R73.03 PREDIABETES: ICD-10-CM

## 2025-05-16 NOTE — TELEPHONE ENCOUNTER
Patient requesting a refill on trulicity 1.5 mg/0.5 ml.  Patient also asking if you can give her the result of her stress test that emergency room sent her to get.

## 2025-05-19 RX ORDER — DULAGLUTIDE 1.5 MG/.5ML
1.5 INJECTION, SOLUTION SUBCUTANEOUS
Qty: 3 EACH | Refills: 1 | Status: SHIPPED | OUTPATIENT
Start: 2025-05-25

## 2025-06-17 DIAGNOSIS — R23.2 HOT FLASHES RELATED TO AROMATASE INHIBITOR THERAPY: ICD-10-CM

## 2025-06-17 DIAGNOSIS — Z17.0 MALIGNANT NEOPLASM OF BREAST IN FEMALE, ESTROGEN RECEPTOR POSITIVE, UNSPECIFIED LATERALITY, UNSPECIFIED SITE OF BREAST: Primary | ICD-10-CM

## 2025-06-17 DIAGNOSIS — T45.1X5A HOT FLASHES RELATED TO AROMATASE INHIBITOR THERAPY: ICD-10-CM

## 2025-06-17 DIAGNOSIS — C50.919 MALIGNANT NEOPLASM OF BREAST IN FEMALE, ESTROGEN RECEPTOR POSITIVE, UNSPECIFIED LATERALITY, UNSPECIFIED SITE OF BREAST: Primary | ICD-10-CM

## 2025-06-17 RX ORDER — VENLAFAXINE HYDROCHLORIDE 37.5 MG/1
37.5 CAPSULE, EXTENDED RELEASE ORAL DAILY
Qty: 90 CAPSULE | Refills: 2 | Status: SHIPPED | OUTPATIENT
Start: 2025-06-17

## 2025-06-17 RX ORDER — LETROZOLE 2.5 MG/1
2.5 TABLET, FILM COATED ORAL DAILY
Qty: 90 TABLET | Refills: 2 | Status: SHIPPED | OUTPATIENT
Start: 2025-06-17

## 2025-08-13 ENCOUNTER — DOCUMENTATION (OUTPATIENT)
Dept: PHYSICAL THERAPY | Facility: CLINIC | Age: 65
End: 2025-08-13
Payer: COMMERCIAL

## 2025-09-10 ENCOUNTER — APPOINTMENT (OUTPATIENT)
Dept: OPHTHALMOLOGY | Facility: CLINIC | Age: 65
End: 2025-09-10
Payer: COMMERCIAL

## 2026-01-23 ENCOUNTER — APPOINTMENT (OUTPATIENT)
Dept: SLEEP MEDICINE | Facility: CLINIC | Age: 66
End: 2026-01-23
Payer: COMMERCIAL

## 2026-01-28 ENCOUNTER — APPOINTMENT (OUTPATIENT)
Dept: DERMATOLOGY | Facility: CLINIC | Age: 66
End: 2026-01-28
Payer: COMMERCIAL